# Patient Record
Sex: FEMALE | Race: WHITE | NOT HISPANIC OR LATINO | Employment: OTHER | URBAN - METROPOLITAN AREA
[De-identification: names, ages, dates, MRNs, and addresses within clinical notes are randomized per-mention and may not be internally consistent; named-entity substitution may affect disease eponyms.]

---

## 2017-05-28 DIAGNOSIS — I65.23 OCCLUSION AND STENOSIS OF BILATERAL CAROTID ARTERIES: ICD-10-CM

## 2017-06-01 ENCOUNTER — HOSPITAL ENCOUNTER (OUTPATIENT)
Dept: NON INVASIVE DIAGNOSTICS | Facility: CLINIC | Age: 82
Discharge: HOME | End: 2017-06-01
Payer: MEDICARE

## 2017-06-01 DIAGNOSIS — I65.23 OCCLUSION AND STENOSIS OF BILATERAL CAROTID ARTERIES: ICD-10-CM

## 2017-06-01 PROCEDURE — 93880 EXTRACRANIAL BILAT STUDY: CPT

## 2018-04-24 ENCOUNTER — TRANSCRIBE ORDERS (OUTPATIENT)
Dept: ADMINISTRATIVE | Facility: HOSPITAL | Age: 83
End: 2018-04-24

## 2018-04-24 DIAGNOSIS — I65.23 BILATERAL CAROTID ARTERY STENOSIS: Primary | ICD-10-CM

## 2018-06-07 ENCOUNTER — HOSPITAL ENCOUNTER (OUTPATIENT)
Dept: NON INVASIVE DIAGNOSTICS | Facility: CLINIC | Age: 83
Discharge: HOME | End: 2018-06-07
Payer: MEDICARE

## 2018-06-07 DIAGNOSIS — I65.23 BILATERAL CAROTID ARTERY STENOSIS: ICD-10-CM

## 2018-06-07 PROCEDURE — 93880 EXTRACRANIAL BILAT STUDY: CPT

## 2018-06-07 PROCEDURE — 93880 EXTRACRANIAL BILAT STUDY: CPT | Performed by: SURGERY

## 2019-06-11 ENCOUNTER — TELEPHONE (OUTPATIENT)
Dept: ADMINISTRATIVE | Facility: HOSPITAL | Age: 84
End: 2019-06-11

## 2019-06-11 DIAGNOSIS — I65.23 CAROTID STENOSIS, BILATERAL: Primary | ICD-10-CM

## 2019-07-01 ENCOUNTER — HOSPITAL ENCOUNTER (OUTPATIENT)
Dept: NON INVASIVE DIAGNOSTICS | Facility: CLINIC | Age: 84
Discharge: HOME | End: 2019-07-01
Payer: MEDICARE

## 2019-07-01 DIAGNOSIS — I65.23 CAROTID STENOSIS, BILATERAL: ICD-10-CM

## 2019-07-01 PROCEDURE — 93880 EXTRACRANIAL BILAT STUDY: CPT

## 2019-07-02 PROCEDURE — 93880 EXTRACRANIAL BILAT STUDY: CPT | Performed by: SURGERY

## 2019-07-11 NOTE — PROGRESS NOTES
Assessment/Plan:    81 y/o F with HTN, HLD, CAD hx MI, and carotid stenosis, seen for evaluation of carotid stenosis  Asymptomatic bilateral carotid artery stenosis  -CV duplex results- Bilateral ICA less than 50% stenosis  -Asymptomatic  -Continue aspirin and atorvastatin  -BP within normal limits  Continue current medication regimen  -Will continue routine surveillance monitoring with CV duplex [id number]    Hypertension  -BP within normal limits at today's visit  -Continue current medication regimen  -Management per PCP    Hyperlipidemia  -Continue atorvastatin       Diagnoses and all orders for this visit:    Asymptomatic bilateral carotid artery stenosis  -     Cancel: VAS carotid complete study; Future  -     VAS carotid complete study; Future    Mixed hyperlipidemia    Essential hypertension          Subjective:      Patient ID: Chris Parkinson is a 80 y o  female  Patient is new to our practice, referred by Dr Melodie Mena  Seen to establish routine vascular care  Patient has known bilateral carotid stenosis  She had CV duplex done 7/1/19  She denies any TIA/CVA symptoms; denies amaurosis fugax, lateralizing weakness or numbness, slurred speech, facial droop, or visual disturbances  She denies any family history of stroke  She reports that she has been on aspirin and cholesterol medication since a heart attack over 30 years ago  She denies any claudication symptoms  Offers no other complaints  States she walks often and rides her bike with no difficulty  She is a never smoker  Pt is new to our practice and was referred by Dr Rosemarie Grande MD to review the results of her CV on 7/1/19  Pt denies any CVA/TIA symptoms  Pt is currently taking ASA and Lipitor        The following portions of the patient's history were reviewed and updated as appropriate: allergies, current medications, past family history, past medical history, past social history, past surgical history and problem list     Review of Systems   Constitutional: Negative  HENT: Positive for ear discharge and ear pain  Eyes: Negative  Respiratory: Negative  Cardiovascular: Negative  Gastrointestinal: Negative  Endocrine: Negative  Genitourinary: Negative  Musculoskeletal: Negative  Skin: Negative  Allergic/Immunologic: Negative  Neurological: Negative  Hematological: Negative  Psychiatric/Behavioral: Negative  Objective:     Physical Exam   Constitutional: She is oriented to person, place, and time  She appears well-nourished  No distress  HENT:   Head: Normocephalic and atraumatic  Eyes: No scleral icterus  Neck: Neck supple  No JVD present  No carotid bruits   Cardiovascular: Normal rate and regular rhythm  Pulses:       Radial pulses are 2+ on the right side, and 2+ on the left side  Pulmonary/Chest: Effort normal and breath sounds normal    Abdominal: Soft  Bowel sounds are normal  She exhibits no distension and no mass  There is no tenderness  Musculoskeletal: She exhibits no edema  Neurological: She is alert and oriented to person, place, and time  Skin: Skin is warm and dry  Psychiatric: She has a normal mood and affect  I have reviewed and made appropriate changes to the review of systems input by the medical assistant      Vitals:    07/15/19 1340 07/15/19 1343   BP: 118/72 122/76   BP Location: Left arm Right arm   Patient Position: Sitting Sitting   Cuff Size: Adult Adult   Pulse: 84    Resp: 16    Temp: 98 2 °F (36 8 °C)    TempSrc: Tympanic    Weight: 56 7 kg (125 lb)    Height: 5' (1 524 m)        Patient Active Problem List   Diagnosis    Hyperlipidemia    Hypertension    Asymptomatic bilateral carotid artery stenosis       Past Surgical History:   Procedure Laterality Date    ADENOIDECTOMY      APPENDECTOMY      STAPEDECTOMY      TONSILLECTOMY         Family History   Problem Relation Age of Onset    No Known Problems Mother Social History     Socioeconomic History    Marital status: /Civil Union     Spouse name: Not on file    Number of children: Not on file    Years of education: Not on file    Highest education level: Not on file   Occupational History    Not on file   Social Needs    Financial resource strain: Not on file    Food insecurity:     Worry: Not on file     Inability: Not on file    Transportation needs:     Medical: Not on file     Non-medical: Not on file   Tobacco Use    Smoking status: Never Smoker    Smokeless tobacco: Never Used   Substance and Sexual Activity    Alcohol use:  Yes    Drug use: Never    Sexual activity: Not on file   Lifestyle    Physical activity:     Days per week: Not on file     Minutes per session: Not on file    Stress: Not on file   Relationships    Social connections:     Talks on phone: Not on file     Gets together: Not on file     Attends Baptism service: Not on file     Active member of club or organization: Not on file     Attends meetings of clubs or organizations: Not on file     Relationship status: Not on file    Intimate partner violence:     Fear of current or ex partner: Not on file     Emotionally abused: Not on file     Physically abused: Not on file     Forced sexual activity: Not on file   Other Topics Concern    Not on file   Social History Narrative    Not on file       No Known Allergies      Current Outpatient Medications:     ALPRAZolam (XANAX) 0 25 mg tablet, Take 0 25 mg by mouth daily as needed, Disp: , Rfl: 5    amLODIPine (NORVASC) 5 mg tablet, Take by mouth, Disp: , Rfl:     aspirin 81 MG tablet, Take by mouth, Disp: , Rfl:     atorvastatin (LIPITOR) 40 mg tablet, , Disp: , Rfl:     FLUoxetine (PROzac) 10 mg capsule, , Disp: , Rfl:     fluticasone (FLONASE) 50 mcg/act nasal spray, , Disp: , Rfl:     ibuprofen (MOTRIN) 800 mg tablet, , Disp: , Rfl:     levothyroxine 25 mcg tablet, , Disp: , Rfl:     lisinopril (ZESTRIL) 20 mg tablet, , Disp: , Rfl:     nadolol (CORGARD) 40 mg tablet, , Disp: , Rfl:     erythromycin (ILOTYCIN) ophthalmic ointment, , Disp: , Rfl:

## 2019-07-15 ENCOUNTER — OFFICE VISIT (OUTPATIENT)
Dept: VASCULAR SURGERY | Facility: CLINIC | Age: 84
End: 2019-07-15
Payer: MEDICARE

## 2019-07-15 VITALS
TEMPERATURE: 98.2 F | SYSTOLIC BLOOD PRESSURE: 122 MMHG | BODY MASS INDEX: 24.54 KG/M2 | HEIGHT: 60 IN | WEIGHT: 125 LBS | HEART RATE: 84 BPM | RESPIRATION RATE: 16 BRPM | DIASTOLIC BLOOD PRESSURE: 76 MMHG

## 2019-07-15 DIAGNOSIS — E78.2 MIXED HYPERLIPIDEMIA: ICD-10-CM

## 2019-07-15 DIAGNOSIS — I10 ESSENTIAL HYPERTENSION: ICD-10-CM

## 2019-07-15 DIAGNOSIS — I65.23 ASYMPTOMATIC BILATERAL CAROTID ARTERY STENOSIS: Primary | ICD-10-CM

## 2019-07-15 PROCEDURE — 99204 OFFICE O/P NEW MOD 45 MIN: CPT | Performed by: NURSE PRACTITIONER

## 2019-07-15 NOTE — PATIENT INSTRUCTIONS
Bilateral asymptomatic carotid stenosis  -we reviewed the results of your recent carotid artery duplex  You have mild less than 50% carotid stenosis to both carotid arteries, this is unchanged for multiple years  -continue all your current medications including aspirin and Lipitor/atorvastatin  -we will continue to monitor for any disease progression with a repeat carotid duplex in 24 months/2 years  -follow-up after repeat imaging    Notify the office prior with any questions/concerns or change in symptoms

## 2019-07-15 NOTE — ASSESSMENT & PLAN NOTE
-BP within normal limits at today's visit  -Continue current medication regimen  -Management per PCP

## 2019-07-15 NOTE — LETTER
July 15, 2019     Brian Freeman MD  [facility] [address]    Patient: Lovetta Angelucci   YOB: 1929   Date of Visit: 7/15/2019       Dear Dr Inocencia Chen: Thank you for referring Tram Maria to me for evaluation  Below are my notes for this consultation  If you have questions, please do not hesitate to call me  I look forward to following your patient along with you           Sincerely,        BLANCA Vail        CC: No Recipients

## 2020-01-31 ENCOUNTER — LAB REQUISITION (OUTPATIENT)
Dept: LAB | Facility: HOSPITAL | Age: 85
End: 2020-01-31
Payer: MEDICARE

## 2020-01-31 DIAGNOSIS — L03.032 CELLULITIS OF LEFT TOE: ICD-10-CM

## 2020-01-31 PROCEDURE — 87186 SC STD MICRODIL/AGAR DIL: CPT | Performed by: PODIATRIST

## 2020-01-31 PROCEDURE — 87147 CULTURE TYPE IMMUNOLOGIC: CPT | Performed by: PODIATRIST

## 2020-01-31 PROCEDURE — 87070 CULTURE OTHR SPECIMN AEROBIC: CPT | Performed by: PODIATRIST

## 2020-01-31 PROCEDURE — 87205 SMEAR GRAM STAIN: CPT | Performed by: PODIATRIST

## 2020-02-03 LAB
BACTERIA WND AEROBE CULT: ABNORMAL
GRAM STN SPEC: ABNORMAL
GRAM STN SPEC: ABNORMAL

## 2020-04-21 DIAGNOSIS — I10 ESSENTIAL HYPERTENSION: Primary | ICD-10-CM

## 2020-04-21 RX ORDER — LISINOPRIL 20 MG/1
TABLET ORAL
Qty: 90 TABLET | Refills: 1 | Status: SHIPPED | OUTPATIENT
Start: 2020-04-21 | End: 2020-08-31

## 2020-04-24 ENCOUNTER — TELEMEDICINE (OUTPATIENT)
Dept: FAMILY MEDICINE CLINIC | Facility: CLINIC | Age: 85
End: 2020-04-24
Payer: MEDICARE

## 2020-04-24 VITALS — HEIGHT: 60 IN | BODY MASS INDEX: 24.54 KG/M2 | WEIGHT: 125 LBS

## 2020-04-24 DIAGNOSIS — I10 ESSENTIAL HYPERTENSION: ICD-10-CM

## 2020-04-24 DIAGNOSIS — E78.2 MIXED HYPERLIPIDEMIA: ICD-10-CM

## 2020-04-24 DIAGNOSIS — E11.9 TYPE 2 DIABETES MELLITUS WITHOUT COMPLICATION, WITHOUT LONG-TERM CURRENT USE OF INSULIN (HCC): Primary | ICD-10-CM

## 2020-04-24 DIAGNOSIS — I65.23 ASYMPTOMATIC BILATERAL CAROTID ARTERY STENOSIS: ICD-10-CM

## 2020-04-24 DIAGNOSIS — E55.9 VITAMIN D DEFICIENCY: ICD-10-CM

## 2020-04-24 DIAGNOSIS — F41.9 ANXIETY: ICD-10-CM

## 2020-04-24 DIAGNOSIS — E53.8 B12 DEFICIENCY: ICD-10-CM

## 2020-04-24 DIAGNOSIS — E03.8 OTHER SPECIFIED HYPOTHYROIDISM: ICD-10-CM

## 2020-04-24 DIAGNOSIS — H91.90 HEARING LOSS, UNSPECIFIED HEARING LOSS TYPE, UNSPECIFIED LATERALITY: ICD-10-CM

## 2020-04-24 DIAGNOSIS — I25.10 CORONARY ARTERY DISEASE INVOLVING NATIVE HEART WITHOUT ANGINA PECTORIS, UNSPECIFIED VESSEL OR LESION TYPE: ICD-10-CM

## 2020-04-24 PROBLEM — M81.0 AGE-RELATED OSTEOPOROSIS WITHOUT CURRENT PATHOLOGICAL FRACTURE: Status: ACTIVE | Noted: 2020-04-24

## 2020-04-24 PROBLEM — K59.01 SLOW TRANSIT CONSTIPATION: Status: ACTIVE | Noted: 2020-04-24

## 2020-04-24 PROBLEM — E07.9 DISEASE OF THYROID GLAND: Status: RESOLVED | Noted: 2020-04-24 | Resolved: 2020-04-24

## 2020-04-24 PROBLEM — I35.0 SEVERE AORTIC STENOSIS: Status: ACTIVE | Noted: 2020-04-24

## 2020-04-24 PROCEDURE — 99215 OFFICE O/P EST HI 40 MIN: CPT | Performed by: FAMILY MEDICINE

## 2020-04-24 RX ORDER — AMLODIPINE BESYLATE 2.5 MG/1
TABLET ORAL
Qty: 270 TABLET | Refills: 1 | Status: SHIPPED | OUTPATIENT
Start: 2020-04-24 | End: 2020-12-04 | Stop reason: SDUPTHER

## 2020-04-24 RX ORDER — BLOOD SUGAR DIAGNOSTIC
STRIP MISCELLANEOUS
COMMUNITY
Start: 2020-02-17 | End: 2020-05-26

## 2020-04-24 RX ORDER — LEVOTHYROXINE SODIUM 0.03 MG/1
25 TABLET ORAL DAILY
Qty: 90 TABLET | Refills: 1 | Status: SHIPPED | OUTPATIENT
Start: 2020-04-24 | End: 2020-12-19 | Stop reason: SDUPTHER

## 2020-04-24 RX ORDER — METFORMIN HYDROCHLORIDE 500 MG/1
500 TABLET, EXTENDED RELEASE ORAL DAILY
Qty: 90 TABLET | Refills: 1 | Status: SHIPPED | OUTPATIENT
Start: 2020-04-24 | End: 2021-01-18

## 2020-04-24 RX ORDER — CEFADROXIL 500 MG/1
500 CAPSULE ORAL 2 TIMES DAILY
COMMUNITY
Start: 2020-01-31 | End: 2020-12-04 | Stop reason: ALTCHOICE

## 2020-05-08 ENCOUNTER — APPOINTMENT (OUTPATIENT)
Dept: LAB | Facility: CLINIC | Age: 85
End: 2020-05-08
Payer: MEDICARE

## 2020-05-08 DIAGNOSIS — I10 ESSENTIAL HYPERTENSION: ICD-10-CM

## 2020-05-08 DIAGNOSIS — E53.8 B12 DEFICIENCY: ICD-10-CM

## 2020-05-08 DIAGNOSIS — E11.9 TYPE 2 DIABETES MELLITUS WITHOUT COMPLICATION, WITHOUT LONG-TERM CURRENT USE OF INSULIN (HCC): ICD-10-CM

## 2020-05-08 DIAGNOSIS — I25.10 CORONARY ARTERY DISEASE INVOLVING NATIVE HEART WITHOUT ANGINA PECTORIS, UNSPECIFIED VESSEL OR LESION TYPE: ICD-10-CM

## 2020-05-08 DIAGNOSIS — E78.2 MIXED HYPERLIPIDEMIA: ICD-10-CM

## 2020-05-08 DIAGNOSIS — E03.8 OTHER SPECIFIED HYPOTHYROIDISM: ICD-10-CM

## 2020-05-08 DIAGNOSIS — E55.9 VITAMIN D DEFICIENCY: ICD-10-CM

## 2020-05-08 LAB
25(OH)D3 SERPL-MCNC: 24.1 NG/ML (ref 30–100)
ALBUMIN SERPL BCP-MCNC: 3.7 G/DL (ref 3.5–5)
ALP SERPL-CCNC: 148 U/L (ref 46–116)
ALT SERPL W P-5'-P-CCNC: 20 U/L (ref 12–78)
ANION GAP SERPL CALCULATED.3IONS-SCNC: 0 MMOL/L (ref 4–13)
AST SERPL W P-5'-P-CCNC: 17 U/L (ref 5–45)
BILIRUB SERPL-MCNC: 0.65 MG/DL (ref 0.2–1)
BILIRUB UR QL STRIP: NEGATIVE
BUN SERPL-MCNC: 11 MG/DL (ref 5–25)
CALCIUM SERPL-MCNC: 9.5 MG/DL (ref 8.3–10.1)
CHLORIDE SERPL-SCNC: 103 MMOL/L (ref 100–108)
CHOLEST SERPL-MCNC: 151 MG/DL (ref 50–200)
CLARITY UR: CLEAR
CO2 SERPL-SCNC: 31 MMOL/L (ref 21–32)
COLOR UR: YELLOW
CREAT SERPL-MCNC: 0.86 MG/DL (ref 0.6–1.3)
CREAT UR-MCNC: 24.6 MG/DL
ERYTHROCYTE [DISTWIDTH] IN BLOOD BY AUTOMATED COUNT: 13 % (ref 11.6–15.1)
EST. AVERAGE GLUCOSE BLD GHB EST-MCNC: 148 MG/DL
GFR SERPL CREATININE-BSD FRML MDRD: 60 ML/MIN/1.73SQ M
GLUCOSE P FAST SERPL-MCNC: 146 MG/DL (ref 65–99)
GLUCOSE UR STRIP-MCNC: NEGATIVE MG/DL
HBA1C MFR BLD: 6.8 %
HCT VFR BLD AUTO: 43.6 % (ref 34.8–46.1)
HDLC SERPL-MCNC: 59 MG/DL
HGB BLD-MCNC: 14 G/DL (ref 11.5–15.4)
HGB UR QL STRIP.AUTO: NEGATIVE
KETONES UR STRIP-MCNC: NEGATIVE MG/DL
LDLC SERPL CALC-MCNC: 73 MG/DL (ref 0–100)
LEUKOCYTE ESTERASE UR QL STRIP: NEGATIVE
MAGNESIUM SERPL-MCNC: 2.3 MG/DL (ref 1.6–2.6)
MCH RBC QN AUTO: 30.6 PG (ref 26.8–34.3)
MCHC RBC AUTO-ENTMCNC: 32.1 G/DL (ref 31.4–37.4)
MCV RBC AUTO: 95 FL (ref 82–98)
MICROALBUMIN UR-MCNC: 41.5 MG/L (ref 0–20)
MICROALBUMIN/CREAT 24H UR: 169 MG/G CREATININE (ref 0–30)
NITRITE UR QL STRIP: NEGATIVE
NONHDLC SERPL-MCNC: 92 MG/DL
NT-PROBNP SERPL-MCNC: 888 PG/ML
PH UR STRIP.AUTO: 7.5 [PH]
PLATELET # BLD AUTO: 282 THOUSANDS/UL (ref 149–390)
PMV BLD AUTO: 11.6 FL (ref 8.9–12.7)
POTASSIUM SERPL-SCNC: 4.1 MMOL/L (ref 3.5–5.3)
PROT SERPL-MCNC: 8.2 G/DL (ref 6.4–8.2)
PROT UR STRIP-MCNC: NEGATIVE MG/DL
RBC # BLD AUTO: 4.58 MILLION/UL (ref 3.81–5.12)
SODIUM SERPL-SCNC: 134 MMOL/L (ref 136–145)
SP GR UR STRIP.AUTO: 1.01 (ref 1–1.03)
TRIGL SERPL-MCNC: 95 MG/DL
TSH SERPL DL<=0.05 MIU/L-ACNC: 3.49 UIU/ML (ref 0.36–3.74)
UROBILINOGEN UR QL STRIP.AUTO: 0.2 E.U./DL
VIT B12 SERPL-MCNC: 242 PG/ML (ref 100–900)
WBC # BLD AUTO: 7.56 THOUSAND/UL (ref 4.31–10.16)

## 2020-05-08 PROCEDURE — 80053 COMPREHEN METABOLIC PANEL: CPT

## 2020-05-08 PROCEDURE — 85027 COMPLETE CBC AUTOMATED: CPT

## 2020-05-08 PROCEDURE — 82607 VITAMIN B-12: CPT

## 2020-05-08 PROCEDURE — 83036 HEMOGLOBIN GLYCOSYLATED A1C: CPT

## 2020-05-08 PROCEDURE — 83735 ASSAY OF MAGNESIUM: CPT

## 2020-05-08 PROCEDURE — 82043 UR ALBUMIN QUANTITATIVE: CPT

## 2020-05-08 PROCEDURE — 82570 ASSAY OF URINE CREATININE: CPT

## 2020-05-08 PROCEDURE — 82306 VITAMIN D 25 HYDROXY: CPT

## 2020-05-08 PROCEDURE — 81003 URINALYSIS AUTO W/O SCOPE: CPT

## 2020-05-08 PROCEDURE — 83880 ASSAY OF NATRIURETIC PEPTIDE: CPT

## 2020-05-08 PROCEDURE — 36415 COLL VENOUS BLD VENIPUNCTURE: CPT

## 2020-05-08 PROCEDURE — 80061 LIPID PANEL: CPT

## 2020-05-08 PROCEDURE — 84443 ASSAY THYROID STIM HORMONE: CPT

## 2020-05-11 ENCOUNTER — TELEPHONE (OUTPATIENT)
Dept: FAMILY MEDICINE CLINIC | Facility: CLINIC | Age: 85
End: 2020-05-11

## 2020-05-11 DIAGNOSIS — R60.9 EDEMA, UNSPECIFIED TYPE: Primary | ICD-10-CM

## 2020-05-11 RX ORDER — FUROSEMIDE 20 MG/1
20 TABLET ORAL DAILY
Qty: 30 TABLET | Refills: 5 | Status: SHIPPED | OUTPATIENT
Start: 2020-05-11 | End: 2020-12-04 | Stop reason: ALTCHOICE

## 2020-05-25 DIAGNOSIS — E11.9 TYPE 2 DIABETES MELLITUS WITHOUT COMPLICATION, WITHOUT LONG-TERM CURRENT USE OF INSULIN (HCC): Primary | ICD-10-CM

## 2020-05-26 RX ORDER — LANCETS 28 GAUGE
EACH MISCELLANEOUS
Qty: 100 EACH | Refills: 11 | Status: SHIPPED | OUTPATIENT
Start: 2020-05-26 | End: 2020-07-22 | Stop reason: SDUPTHER

## 2020-05-26 RX ORDER — BLOOD SUGAR DIAGNOSTIC
STRIP MISCELLANEOUS
Qty: 100 EACH | Refills: 4 | Status: SHIPPED | OUTPATIENT
Start: 2020-05-26 | End: 2020-07-22 | Stop reason: SDUPTHER

## 2020-07-22 DIAGNOSIS — E11.9 TYPE 2 DIABETES MELLITUS WITHOUT COMPLICATION, WITHOUT LONG-TERM CURRENT USE OF INSULIN (HCC): ICD-10-CM

## 2020-07-22 RX ORDER — LANCETS 28 GAUGE
EACH MISCELLANEOUS
Qty: 100 EACH | Refills: 11 | Status: SHIPPED | OUTPATIENT
Start: 2020-07-22 | End: 2021-10-04

## 2020-08-31 DIAGNOSIS — E11.9 TYPE 2 DIABETES MELLITUS WITHOUT COMPLICATION, WITHOUT LONG-TERM CURRENT USE OF INSULIN (HCC): ICD-10-CM

## 2020-08-31 DIAGNOSIS — I10 ESSENTIAL HYPERTENSION: ICD-10-CM

## 2020-08-31 RX ORDER — BLOOD SUGAR DIAGNOSTIC
STRIP MISCELLANEOUS
Qty: 100 EACH | Refills: 4 | Status: SHIPPED | OUTPATIENT
Start: 2020-08-31 | End: 2021-09-07

## 2020-08-31 RX ORDER — LISINOPRIL 20 MG/1
TABLET ORAL
Qty: 90 TABLET | Refills: 1 | Status: SHIPPED | OUTPATIENT
Start: 2020-08-31 | End: 2020-12-04 | Stop reason: SDUPTHER

## 2020-09-03 DIAGNOSIS — I10 ESSENTIAL HYPERTENSION: Primary | ICD-10-CM

## 2020-09-04 RX ORDER — NADOLOL 40 MG/1
TABLET ORAL
Qty: 90 TABLET | Refills: 1 | Status: SHIPPED | OUTPATIENT
Start: 2020-09-04 | End: 2020-12-04 | Stop reason: SDUPTHER

## 2020-09-22 DIAGNOSIS — F41.9 ANXIETY: Primary | ICD-10-CM

## 2020-09-22 RX ORDER — FLUOXETINE 10 MG/1
10 TABLET, FILM COATED ORAL DAILY
Qty: 30 TABLET | Refills: 5 | Status: SHIPPED | OUTPATIENT
Start: 2020-09-22 | End: 2020-12-04

## 2020-10-05 DIAGNOSIS — E11.9 TYPE 2 DIABETES MELLITUS WITHOUT COMPLICATION, WITHOUT LONG-TERM CURRENT USE OF INSULIN (HCC): Primary | ICD-10-CM

## 2020-10-06 RX ORDER — ALPRAZOLAM 0.25 MG/1
TABLET ORAL
Qty: 30 TABLET | Refills: 5 | Status: SHIPPED | OUTPATIENT
Start: 2020-10-06 | End: 2020-12-04 | Stop reason: ALTCHOICE

## 2020-10-21 ENCOUNTER — TELEPHONE (OUTPATIENT)
Dept: FAMILY MEDICINE CLINIC | Facility: CLINIC | Age: 85
End: 2020-10-21

## 2020-12-04 ENCOUNTER — OFFICE VISIT (OUTPATIENT)
Dept: FAMILY MEDICINE CLINIC | Facility: CLINIC | Age: 85
End: 2020-12-04
Payer: MEDICARE

## 2020-12-04 VITALS
WEIGHT: 134 LBS | SYSTOLIC BLOOD PRESSURE: 126 MMHG | HEART RATE: 73 BPM | OXYGEN SATURATION: 97 % | HEIGHT: 60 IN | RESPIRATION RATE: 16 BRPM | DIASTOLIC BLOOD PRESSURE: 82 MMHG | BODY MASS INDEX: 26.31 KG/M2

## 2020-12-04 DIAGNOSIS — Z00.00 WELL ADULT EXAM: ICD-10-CM

## 2020-12-04 DIAGNOSIS — E03.9 HYPOTHYROIDISM, UNSPECIFIED TYPE: ICD-10-CM

## 2020-12-04 DIAGNOSIS — F41.9 ANXIETY: Primary | ICD-10-CM

## 2020-12-04 DIAGNOSIS — E53.8 B12 DEFICIENCY: ICD-10-CM

## 2020-12-04 DIAGNOSIS — E11.9 TYPE 2 DIABETES MELLITUS WITHOUT COMPLICATION, WITHOUT LONG-TERM CURRENT USE OF INSULIN (HCC): ICD-10-CM

## 2020-12-04 DIAGNOSIS — I10 ESSENTIAL HYPERTENSION: ICD-10-CM

## 2020-12-04 DIAGNOSIS — E55.9 VITAMIN D DEFICIENCY: ICD-10-CM

## 2020-12-04 PROCEDURE — 99214 OFFICE O/P EST MOD 30 MIN: CPT | Performed by: FAMILY MEDICINE

## 2020-12-04 PROCEDURE — G0439 PPPS, SUBSEQ VISIT: HCPCS | Performed by: FAMILY MEDICINE

## 2020-12-04 RX ORDER — LISINOPRIL 20 MG/1
20 TABLET ORAL DAILY
Qty: 90 TABLET | Refills: 1 | Status: SHIPPED | OUTPATIENT
Start: 2020-12-04 | End: 2021-06-29

## 2020-12-04 RX ORDER — ESCITALOPRAM OXALATE 10 MG/1
10 TABLET ORAL DAILY
Qty: 30 TABLET | Refills: 1 | Status: SHIPPED | OUTPATIENT
Start: 2020-12-04 | End: 2021-04-09 | Stop reason: ALTCHOICE

## 2020-12-04 RX ORDER — NADOLOL 40 MG/1
40 TABLET ORAL DAILY
Qty: 90 TABLET | Refills: 1 | Status: SHIPPED | OUTPATIENT
Start: 2020-12-04 | End: 2021-06-21

## 2020-12-04 RX ORDER — FLUOXETINE 10 MG/1
10 CAPSULE ORAL DAILY
COMMUNITY
Start: 2020-11-19 | End: 2020-12-04

## 2020-12-04 RX ORDER — A/SINGAPORE/GP1908/2015 IVR-180 (AN A/MICHIGAN/45/2015 (H1N1)PDM09-LIKE VIRUS, A/HONG KONG/4801/2014, NYMC X-263B (H3N2) (AN A/HONG KONG/4801/2014-LIKE VIRUS), AND B/BRISBANE/60/2008, WILD TYPE (A B/BRISBANE/60/2008-LIKE VIRUS) 15; 15; 15 UG/.5ML; UG/.5ML; UG/.5ML
INJECTION, SUSPENSION INTRAMUSCULAR
COMMUNITY
Start: 2020-11-02 | End: 2021-04-09 | Stop reason: ALTCHOICE

## 2020-12-04 RX ORDER — ATORVASTATIN CALCIUM 40 MG/1
40 TABLET, FILM COATED ORAL DAILY
Qty: 90 TABLET | Refills: 1 | Status: SHIPPED | OUTPATIENT
Start: 2020-12-04 | End: 2021-06-21

## 2020-12-04 RX ORDER — DIAZEPAM 5 MG/1
5 TABLET ORAL EVERY 6 HOURS PRN
COMMUNITY
End: 2020-12-07 | Stop reason: SDUPTHER

## 2020-12-04 RX ORDER — AMLODIPINE BESYLATE 2.5 MG/1
2.5 TABLET ORAL 2 TIMES DAILY
Qty: 180 TABLET | Refills: 1 | Status: SHIPPED | OUTPATIENT
Start: 2020-12-04 | End: 2021-04-09 | Stop reason: SDUPTHER

## 2020-12-07 DIAGNOSIS — E11.9 TYPE 2 DIABETES MELLITUS WITHOUT COMPLICATION, WITHOUT LONG-TERM CURRENT USE OF INSULIN (HCC): Primary | ICD-10-CM

## 2020-12-08 RX ORDER — DIAZEPAM 5 MG/1
5 TABLET ORAL EVERY 6 HOURS PRN
Qty: 30 TABLET | Refills: 5 | Status: SHIPPED | OUTPATIENT
Start: 2020-12-08 | End: 2021-04-09 | Stop reason: ALTCHOICE

## 2020-12-19 DIAGNOSIS — E03.8 OTHER SPECIFIED HYPOTHYROIDISM: ICD-10-CM

## 2020-12-19 RX ORDER — LEVOTHYROXINE SODIUM 0.03 MG/1
25 TABLET ORAL DAILY
Qty: 90 TABLET | Refills: 1 | Status: SHIPPED | OUTPATIENT
Start: 2020-12-19 | End: 2020-12-21

## 2020-12-21 DIAGNOSIS — E03.8 OTHER SPECIFIED HYPOTHYROIDISM: ICD-10-CM

## 2020-12-21 RX ORDER — LEVOTHYROXINE SODIUM 0.03 MG/1
TABLET ORAL
Qty: 90 TABLET | Refills: 1 | Status: SHIPPED | OUTPATIENT
Start: 2020-12-21 | End: 2021-01-23 | Stop reason: SDUPTHER

## 2021-01-05 LAB
LEFT EYE DIABETIC RETINOPATHY: NORMAL
RIGHT EYE DIABETIC RETINOPATHY: NORMAL

## 2021-01-16 DIAGNOSIS — E11.9 TYPE 2 DIABETES MELLITUS WITHOUT COMPLICATION, WITHOUT LONG-TERM CURRENT USE OF INSULIN (HCC): ICD-10-CM

## 2021-01-18 RX ORDER — METFORMIN HYDROCHLORIDE 500 MG/1
TABLET, EXTENDED RELEASE ORAL
Qty: 90 TABLET | Refills: 1 | Status: SHIPPED | OUTPATIENT
Start: 2021-01-18 | End: 2021-04-09

## 2021-01-20 DIAGNOSIS — Z23 ENCOUNTER FOR IMMUNIZATION: ICD-10-CM

## 2021-01-21 ENCOUNTER — LAB (OUTPATIENT)
Dept: LAB | Facility: CLINIC | Age: 86
End: 2021-01-21
Payer: MEDICARE

## 2021-01-21 ENCOUNTER — TRANSCRIBE ORDERS (OUTPATIENT)
Dept: LAB | Facility: CLINIC | Age: 86
End: 2021-01-21

## 2021-01-21 DIAGNOSIS — E03.9 HYPOTHYROIDISM, UNSPECIFIED TYPE: ICD-10-CM

## 2021-01-21 DIAGNOSIS — E11.9 TYPE 2 DIABETES MELLITUS WITHOUT COMPLICATION, WITHOUT LONG-TERM CURRENT USE OF INSULIN (HCC): ICD-10-CM

## 2021-01-21 DIAGNOSIS — E55.9 VITAMIN D DEFICIENCY: ICD-10-CM

## 2021-01-21 DIAGNOSIS — I10 ESSENTIAL HYPERTENSION: ICD-10-CM

## 2021-01-21 DIAGNOSIS — F41.9 ANXIETY: ICD-10-CM

## 2021-01-21 DIAGNOSIS — E53.8 B12 DEFICIENCY: ICD-10-CM

## 2021-01-21 LAB
25(OH)D3 SERPL-MCNC: 22.7 NG/ML (ref 30–100)
CHOLEST SERPL-MCNC: 165 MG/DL (ref 50–200)
EST. AVERAGE GLUCOSE BLD GHB EST-MCNC: 186 MG/DL
HBA1C MFR BLD: 8.1 %
HDLC SERPL-MCNC: 56 MG/DL
LDLC SERPL CALC-MCNC: 88 MG/DL (ref 0–100)
NT-PROBNP SERPL-MCNC: 2381 PG/ML
T4 FREE SERPL-MCNC: 0.9 NG/DL (ref 0.76–1.46)
TRIGL SERPL-MCNC: 106 MG/DL
TSH SERPL DL<=0.05 MIU/L-ACNC: 3.83 UIU/ML (ref 0.36–3.74)
VIT B12 SERPL-MCNC: 247 PG/ML (ref 100–900)

## 2021-01-21 PROCEDURE — 83880 ASSAY OF NATRIURETIC PEPTIDE: CPT

## 2021-01-21 PROCEDURE — 80061 LIPID PANEL: CPT

## 2021-01-21 PROCEDURE — 84439 ASSAY OF FREE THYROXINE: CPT

## 2021-01-21 PROCEDURE — 82306 VITAMIN D 25 HYDROXY: CPT

## 2021-01-21 PROCEDURE — 82607 VITAMIN B-12: CPT

## 2021-01-21 PROCEDURE — 83036 HEMOGLOBIN GLYCOSYLATED A1C: CPT

## 2021-01-21 PROCEDURE — 84443 ASSAY THYROID STIM HORMONE: CPT

## 2021-01-21 PROCEDURE — 36415 COLL VENOUS BLD VENIPUNCTURE: CPT

## 2021-01-22 ENCOUNTER — IMMUNIZATIONS (OUTPATIENT)
Dept: FAMILY MEDICINE CLINIC | Facility: HOSPITAL | Age: 86
End: 2021-01-22

## 2021-01-22 DIAGNOSIS — Z23 ENCOUNTER FOR IMMUNIZATION: Primary | ICD-10-CM

## 2021-01-22 PROCEDURE — 91301 SARS-COV-2 / COVID-19 MRNA VACCINE (MODERNA) 100 MCG: CPT

## 2021-01-22 PROCEDURE — 0011A SARS-COV-2 / COVID-19 MRNA VACCINE (MODERNA) 100 MCG: CPT

## 2021-01-23 DIAGNOSIS — E03.8 OTHER SPECIFIED HYPOTHYROIDISM: ICD-10-CM

## 2021-01-23 DIAGNOSIS — R79.89 ELEVATED BRAIN NATRIURETIC PEPTIDE (BNP) LEVEL: Primary | ICD-10-CM

## 2021-01-23 RX ORDER — LEVOTHYROXINE SODIUM 0.03 MG/1
TABLET ORAL
Qty: 90 TABLET | Refills: 1 | Status: SHIPPED | OUTPATIENT
Start: 2021-01-23 | End: 2021-06-21

## 2021-02-17 ENCOUNTER — TELEPHONE (OUTPATIENT)
Dept: VASCULAR SURGERY | Facility: CLINIC | Age: 86
End: 2021-02-17

## 2021-02-17 NOTE — TELEPHONE ENCOUNTER
Pt's daughter was notified- she said she needs to check w/ the pt to see if she wants to move up the carotid doppler and then she will call scheduling back

## 2021-02-17 NOTE — TELEPHONE ENCOUNTER
Pt w/ bilateral carotid stenosis last seen by Tina Vincent on 7/15/19, and at that time was advised to f/u in 2 yrs w/ a carotid doppler and OV, which would be due in July  Pt's daughter called to request doppler be moved up because pt has been experiencing worsening memory loss  I did advise pt's daughter that she should contact pt's PCP regarding memory loss  Pt's daughter denies pt having any vision changes, weakness or numbness  She does report that she gets "slurred speech" once in awhile  Informed her I would send this to our triage provider but she still should contact pt's PCP regarding memory loss

## 2021-02-17 NOTE — TELEPHONE ENCOUNTER
Agree, she needs to discuss this with PCP  We can move up her carotid duplex (12 mo was recommended on report) but carotid disease is unlikely to cause memory loss, further her carotids only showed mild disease so we would not expect significant change since last study

## 2021-02-19 ENCOUNTER — IMMUNIZATIONS (OUTPATIENT)
Dept: FAMILY MEDICINE CLINIC | Facility: HOSPITAL | Age: 86
End: 2021-02-19

## 2021-02-19 DIAGNOSIS — Z23 ENCOUNTER FOR IMMUNIZATION: Primary | ICD-10-CM

## 2021-02-19 PROCEDURE — 91301 SARS-COV-2 / COVID-19 MRNA VACCINE (MODERNA) 100 MCG: CPT | Performed by: EMERGENCY MEDICINE

## 2021-02-19 PROCEDURE — 0012A SARS-COV-2 / COVID-19 MRNA VACCINE (MODERNA) 100 MCG: CPT | Performed by: EMERGENCY MEDICINE

## 2021-03-08 ENCOUNTER — HOSPITAL ENCOUNTER (OUTPATIENT)
Dept: NON INVASIVE DIAGNOSTICS | Facility: HOSPITAL | Age: 86
Discharge: HOME | End: 2021-03-08
Payer: MEDICARE

## 2021-03-08 DIAGNOSIS — R79.89 ELEVATED BRAIN NATRIURETIC PEPTIDE (BNP) LEVEL: ICD-10-CM

## 2021-03-08 PROCEDURE — 93306 TTE W/DOPPLER COMPLETE: CPT

## 2021-03-08 PROCEDURE — 93306 TTE W/DOPPLER COMPLETE: CPT | Performed by: INTERNAL MEDICINE

## 2021-04-06 PROBLEM — I27.20 PULMONARY HYPERTENSION (HCC): Status: ACTIVE | Noted: 2021-04-06

## 2021-04-09 ENCOUNTER — OFFICE VISIT (OUTPATIENT)
Dept: FAMILY MEDICINE CLINIC | Facility: CLINIC | Age: 86
End: 2021-04-09
Payer: MEDICARE

## 2021-04-09 VITALS
BODY MASS INDEX: 25.52 KG/M2 | RESPIRATION RATE: 16 BRPM | HEIGHT: 60 IN | SYSTOLIC BLOOD PRESSURE: 156 MMHG | DIASTOLIC BLOOD PRESSURE: 80 MMHG | WEIGHT: 130 LBS | HEART RATE: 76 BPM | OXYGEN SATURATION: 95 %

## 2021-04-09 DIAGNOSIS — E53.8 B12 DEFICIENCY: ICD-10-CM

## 2021-04-09 DIAGNOSIS — E03.9 HYPOTHYROIDISM, UNSPECIFIED TYPE: ICD-10-CM

## 2021-04-09 DIAGNOSIS — I10 ESSENTIAL HYPERTENSION: ICD-10-CM

## 2021-04-09 DIAGNOSIS — F03.90 DEMENTIA WITHOUT BEHAVIORAL DISTURBANCE, UNSPECIFIED DEMENTIA TYPE (HCC): ICD-10-CM

## 2021-04-09 DIAGNOSIS — E11.9 TYPE 2 DIABETES MELLITUS WITHOUT COMPLICATION, WITHOUT LONG-TERM CURRENT USE OF INSULIN (HCC): ICD-10-CM

## 2021-04-09 DIAGNOSIS — I25.10 CORONARY ARTERY DISEASE INVOLVING NATIVE HEART WITHOUT ANGINA PECTORIS, UNSPECIFIED VESSEL OR LESION TYPE: ICD-10-CM

## 2021-04-09 DIAGNOSIS — I35.0 SEVERE AORTIC STENOSIS: ICD-10-CM

## 2021-04-09 DIAGNOSIS — I27.20 PULMONARY HYPERTENSION (HCC): Primary | ICD-10-CM

## 2021-04-09 PROCEDURE — 99214 OFFICE O/P EST MOD 30 MIN: CPT | Performed by: FAMILY MEDICINE

## 2021-04-09 RX ORDER — DAPAGLIFLOZIN AND METFORMIN HYDROCHLORIDE 5; 1000 MG/1; MG/1
1 TABLET, FILM COATED, EXTENDED RELEASE ORAL DAILY
Qty: 90 TABLET | Refills: 1 | Status: SHIPPED | OUTPATIENT
Start: 2021-04-09 | End: 2021-04-13

## 2021-04-09 RX ORDER — AMLODIPINE BESYLATE 2.5 MG/1
2.5 TABLET ORAL 2 TIMES DAILY
Qty: 180 TABLET | Refills: 1 | Status: SHIPPED | OUTPATIENT
Start: 2021-04-09 | End: 2022-03-14

## 2021-04-09 RX ORDER — DONEPEZIL HYDROCHLORIDE 5 MG/1
5 TABLET, FILM COATED ORAL
Qty: 90 TABLET | Refills: 1 | Status: SHIPPED | OUTPATIENT
Start: 2021-04-09 | End: 2021-09-20

## 2021-04-09 NOTE — PROGRESS NOTES
Assessment/Plan:  Both dauthers are here today to help with her mom   She has good days and bad days   Has been on ssri and benzo in the past   Pretty confused some days not recognising people   We will look for reversive causes   Like vit D b12 BG levels  And thryoid   Also we need to recheck her bnp as it was in the 2000 range she is not really following with [facility]   We will get her to a new cardio   We have had bp isues in the past and have been on norvasc 2 5 2 am 1pm and back to 1 bid - depneding on levels - we are back to 1 bid   Also lets get her to fabricio and start with donepezil 5mg for now     Vit d 5K daily  Change metformin to xigduo       1  Pulmonary hypertension ([facility] [address] )  -     Ambulatory referral to Cardiology; Future    2  Type 2 diabetes mellitus without complication, without long-term current use of insulin ([facility])  -     Dapagliflozin-metFORMIN HCl ER (Xigduo XR) 5-1000 MG TB24; Take 1 tablet by mouth daily  -     HEMOGLOBIN A1C W/ EAG ESTIMATION; Future  -     HEMOGLOBIN A1C W/ EAG ESTIMATION; Future    3  Essential hypertension  -     amLODIPine (NORVASC) 2 5 mg tablet; Take 1 tablet (2 5 mg total) by mouth 2 (two) times a day Take 1 am and 1pm  -     NT-BNP PRO; Future  -     Ambulatory referral to Cardiology; Future    4  Severe aortic stenosis  -     Ambulatory referral to Cardiology; Future    5  Coronary artery disease involving native heart without angina pectoris, unspecified vessel or lesion type  -     Ambulatory referral to Cardiology; Future    6  Dementia without behavioral disturbance, unspecified dementia type [facility])  -     Ambulatory referral to Geriatrics; Future  -     donepezil (ARICEPT) 5 mg tablet; Take 1 tablet (5 mg total) by mouth daily at bedtime    7  B12 deficiency  -     Vitamin B12; Future    8  Hypothyroidism, unspecified type  -     TSH, 3rd generation with Free T4 reflex; Future         Subjective:      Patient ID: Luis Miguel Adams is a 80 y o  female      HPI   Wednesday tending to flowers and fell on the stones   Right elbow and right hip   bruhing     b12 1000mcg      CAD s/p MI @ 49 yo, stable on meds  Nadolol 40 q D, 81mg ASA  Did sees caustacurda 1 / a year    Carotid artery stenosis 50-69% Left side Right <50% repeat in 6/18 stable with Kamara - repeat in 2 2021    severe AS 0 9cm  P HTN: P pressures of 45-55mmHg    HTN -controlled on meds, no CP, Solange 20 q D, , Nadolol 40mg but in ER visit 200/100 rec 10/22 to norvasc2  5 2am 1pm ( will change to 1 am and 1 to 0 to 1/2 evening)    HLD: uncontrolled on meds, no myalgias, Lipitor 20 q d    DM 2 - controlled on meds, no hypoglycemia, metformin 500 ER 2 with dinner last A1c 6 1 from 7 2 to 5 7 to 6 1 to 5 6 to 6 6 will reapeat    HERBERTH - better with meds, Diazepam 5 q d, PRN takes it about 3 times a week    Hypothyroidism - TSH 9 from 6 85 started 0 025mg better now at 5 0 then increased [id number] and could not tolerate it back to 25mcg    takes MVI and Ca 500 with Vit D 600 advised to double it    constipation: bob better with Metamucil    OP -2 5 in the lumbar spine 3/16 - does not want prolia    Anxeity: was on valium bid changed to zoloft then prozac then increased it but going through priscilla and chano    norvasc 2 5 bid  nadolol 40mg for pvc am  lisinopril 20mg am    also we will go back to prozac 10mg again which she is taking at night      Well not working for [employer]  Did well with valium prn   Will try one more ssri though - lexapro     Felt she was confused  So stopped the lexapro   But she is still having quick intermintant confusion   Lasting hours to mins     2 mwf and 1 rest   The following portions of the patient's history were reviewed and updated as appropriate: allergies, current medications, past family history, past medical history, past social history, past surgical history and problem list     Review of Systems   Constitutional: Negative for fever and unexpected weight change     HENT: Negative for nosebleeds and trouble swallowing  Eyes: Negative for visual disturbance  Respiratory: Negative for chest tightness and shortness of breath  Cardiovascular: Negative for chest pain, palpitations and leg swelling  Gastrointestinal: Negative for abdominal pain, constipation, diarrhea and nausea  Endocrine: Negative for cold intolerance  Genitourinary: Negative for dysuria and urgency  Musculoskeletal: Negative for joint swelling and myalgias  Skin: Negative for rash  Neurological: Negative for tremors, seizures and syncope  Hematological: Does not bruise/bleed easily  Psychiatric/Behavioral: Positive for behavioral problems and confusion  Negative for hallucinations and suicidal ideas  The patient is nervous/anxious  Objective:      /80 (BP Location: Left arm, Patient Position: Sitting, Cuff Size: Standard)   Pulse 76   Resp 16   Ht 5' (1 524 m)   Wt 59 kg (130 lb)   SpO2 95%   BMI 25 39 kg/m²     No visits with results within 2 Week(s) from this visit  Latest known visit with results is:   Lab on 01/21/2021   Component Date Value    Vit D, 25-Hydroxy 01/21/2021 22 7*    Cholesterol 01/21/2021 165     Triglycerides 01/21/2021 106     HDL, Direct 01/21/2021 56     LDL Calculated 01/21/2021 88     Hemoglobin A1C 01/21/2021 8 1*    EAG 01/21/2021 186     Vitamin B-12 01/21/2021 247     NT-proBNP 01/21/2021 2,381*    TSH 3RD GENERATON 01/21/2021 3 830*    Free T4 01/21/2021 0 90           Physical Exam  Vitals signs and nursing note reviewed  Constitutional:       Appearance: She is well-developed  HENT:      Head: Normocephalic and atraumatic  Neck:      Musculoskeletal: Normal range of motion and neck supple  Cardiovascular:      Rate and Rhythm: Normal rate and regular rhythm  Pulses: no weak pulses          Dorsalis pedis pulses are 2+ on the right side and 2+ on the left side  Heart sounds: Normal heart sounds  No murmur     Pulmonary:      Effort: Pulmonary effort is normal       Breath sounds: Normal breath sounds  No wheezing or rales  Abdominal:      General: Bowel sounds are normal  There is no distension  Palpations: Abdomen is soft  Tenderness: There is no abdominal tenderness  Musculoskeletal: Normal range of motion  General: No tenderness  Feet:      Right foot:      Skin integrity: Callus and dry skin present  No ulcer, skin breakdown, erythema or warmth  Left foot:      Skin integrity: Callus and dry skin present  No ulcer, skin breakdown, erythema or warmth  Lymphadenopathy:      Cervical: No cervical adenopathy  Skin:     General: Skin is warm and dry  Capillary Refill: Capillary refill takes less than 2 seconds  Findings: No rash  Neurological:      Mental Status: She is alert and oriented to person, place, and time  Cranial Nerves: No cranial nerve deficit  Sensory: No sensory deficit  Motor: No abnormal muscle tone  Psychiatric:         Behavior: Behavior normal          Thought Content: Thought content normal          Judgment: Judgment normal          BMI Counseling: Body mass index is 25 39 kg/m²  The BMI is above normal  Nutrition recommendations include decreasing portion sizes, encouraging healthy choices of fruits and vegetables and limiting drinks that contain sugar  Exercise recommendations include moderate physical activity 150 minutes/week  No pharmacotherapy was ordered  Falls Plan of Care: balance, strength, and gait training instructions were provided  Medications that increase falls were reviewed  Patient's shoes and socks removed  Right Foot/Ankle   Right Foot Inspection  Skin Exam: skin normal, skin intact, dry skin, callus and callus no warmth, no erythema, no maceration, no abnormal color, no pre-ulcer and no ulcer                          Toe Exam: ROM and strength within normal limits  Sensory       Monofilament testing: intact  Vascular  Capillary refills: < 3 seconds  The right DP pulse is 2+  Left Foot/Ankle  Left Foot Inspection  Skin Exam: skin normal, skin intact, dry skin and callusno warmth, no erythema, no maceration, normal color, no pre-ulcer and no ulcer                         Toe Exam: ROM and strength within normal limits                   Sensory       Monofilament: intact  Vascular  Capillary refills: < 3 seconds  The left DP pulse is 2+  Assign Risk Category:  No deformity present; No loss of protective sensation;  No weak pulses       Risk: [address] Arnel Singh MD  [place] [id number]

## 2021-04-13 DIAGNOSIS — E11.9 TYPE 2 DIABETES MELLITUS WITHOUT COMPLICATION, WITHOUT LONG-TERM CURRENT USE OF INSULIN (HCC): Primary | ICD-10-CM

## 2021-04-13 RX ORDER — EMPAGLIFLOZIN, METFORMIN HYDROCHLORIDE 12.5; 1 MG/1; MG/1
1 TABLET, EXTENDED RELEASE ORAL DAILY
Qty: 90 TABLET | Refills: 1 | Status: SHIPPED | OUTPATIENT
Start: 2021-04-13 | End: 2021-10-25

## 2021-05-02 DIAGNOSIS — F41.9 ANXIETY: Primary | ICD-10-CM

## 2021-05-02 RX ORDER — ALPRAZOLAM 0.25 MG/1
TABLET ORAL
Qty: 90 TABLET | Refills: 1 | Status: SHIPPED | OUTPATIENT
Start: 2021-05-02 | End: 2021-06-21

## 2021-05-07 ENCOUNTER — TRANSCRIBE ORDERS (OUTPATIENT)
Dept: LAB | Facility: CLINIC | Age: 86
End: 2021-05-07

## 2021-05-07 ENCOUNTER — APPOINTMENT (OUTPATIENT)
Dept: LAB | Facility: CLINIC | Age: 86
End: 2021-05-07
Payer: MEDICARE

## 2021-05-07 DIAGNOSIS — E53.8 B12 DEFICIENCY: ICD-10-CM

## 2021-05-07 DIAGNOSIS — E11.9 TYPE 2 DIABETES MELLITUS WITHOUT COMPLICATION, WITHOUT LONG-TERM CURRENT USE OF INSULIN (HCC): ICD-10-CM

## 2021-05-07 DIAGNOSIS — E03.9 HYPOTHYROIDISM, UNSPECIFIED TYPE: ICD-10-CM

## 2021-05-07 DIAGNOSIS — I10 ESSENTIAL HYPERTENSION: ICD-10-CM

## 2021-05-07 LAB
EST. AVERAGE GLUCOSE BLD GHB EST-MCNC: 171 MG/DL
HBA1C MFR BLD: 7.6 %
TSH SERPL DL<=0.05 MIU/L-ACNC: 2.74 UIU/ML (ref 0.36–3.74)

## 2021-05-07 PROCEDURE — 83036 HEMOGLOBIN GLYCOSYLATED A1C: CPT

## 2021-05-07 PROCEDURE — 36415 COLL VENOUS BLD VENIPUNCTURE: CPT

## 2021-05-07 PROCEDURE — 84443 ASSAY THYROID STIM HORMONE: CPT

## 2021-05-28 ENCOUNTER — TELEPHONE (OUTPATIENT)
Dept: FAMILY MEDICINE CLINIC | Facility: CLINIC | Age: 86
End: 2021-05-28

## 2021-05-28 NOTE — TELEPHONE ENCOUNTER
Rosa Stephen called in regards to her mother Daniel Toribio, stated that she's getting concerned about Oralee Jewel   Carmen Lo has been having issues sleeping since being prescribed the medication for dementia, also stated that she's been super anxious and some what depressed, stating "she doesn't want to do anything", "theres no point in going anywhere"    Please call patients daughter back

## 2021-05-31 DIAGNOSIS — R45.86 MOOD DISTURBANCE: Primary | ICD-10-CM

## 2021-06-07 ENCOUNTER — TRANSCRIBE ORDERS (OUTPATIENT)
Dept: LAB | Facility: CLINIC | Age: 86
End: 2021-06-07

## 2021-06-11 ENCOUNTER — APPOINTMENT (OUTPATIENT)
Dept: LAB | Facility: CLINIC | Age: 86
End: 2021-06-11
Payer: MEDICARE

## 2021-06-11 LAB
BACTERIA UR QL AUTO: NORMAL /HPF
BILIRUB UR QL STRIP: NEGATIVE
CLARITY UR: CLEAR
COLOR UR: YELLOW
GLUCOSE UR STRIP-MCNC: ABNORMAL MG/DL
HGB UR QL STRIP.AUTO: NEGATIVE
HYALINE CASTS #/AREA URNS LPF: NORMAL /LPF
KETONES UR STRIP-MCNC: NEGATIVE MG/DL
LEUKOCYTE ESTERASE UR QL STRIP: ABNORMAL
NITRITE UR QL STRIP: NEGATIVE
NON-SQ EPI CELLS URNS QL MICRO: NORMAL /HPF
PH UR STRIP.AUTO: 6 [PH]
PROT UR STRIP-MCNC: NEGATIVE MG/DL
RBC #/AREA URNS AUTO: NORMAL /HPF
SP GR UR STRIP.AUTO: 1.01 (ref 1–1.03)
UROBILINOGEN UR QL STRIP.AUTO: 1 E.U./DL
WBC #/AREA URNS AUTO: NORMAL /HPF

## 2021-06-11 PROCEDURE — 81001 URINALYSIS AUTO W/SCOPE: CPT | Performed by: FAMILY MEDICINE

## 2021-06-19 DIAGNOSIS — E11.9 TYPE 2 DIABETES MELLITUS WITHOUT COMPLICATION, WITHOUT LONG-TERM CURRENT USE OF INSULIN (HCC): ICD-10-CM

## 2021-06-19 DIAGNOSIS — E03.8 OTHER SPECIFIED HYPOTHYROIDISM: ICD-10-CM

## 2021-06-19 DIAGNOSIS — F41.9 ANXIETY: ICD-10-CM

## 2021-06-19 DIAGNOSIS — I10 ESSENTIAL HYPERTENSION: ICD-10-CM

## 2021-06-21 RX ORDER — ATORVASTATIN CALCIUM 40 MG/1
TABLET, FILM COATED ORAL
Qty: 90 TABLET | Refills: 3 | Status: SHIPPED | OUTPATIENT
Start: 2021-06-21 | End: 2021-07-30

## 2021-06-21 RX ORDER — LEVOTHYROXINE SODIUM 0.03 MG/1
TABLET ORAL
Qty: 90 TABLET | Refills: 1 | Status: SHIPPED | OUTPATIENT
Start: 2021-06-21 | End: 2021-07-30

## 2021-06-21 RX ORDER — NADOLOL 40 MG/1
TABLET ORAL
Qty: 90 TABLET | Refills: 1 | Status: SHIPPED | OUTPATIENT
Start: 2021-06-21 | End: 2022-03-14

## 2021-06-21 RX ORDER — ALPRAZOLAM 0.25 MG/1
TABLET ORAL
Qty: 30 TABLET | Refills: 5 | Status: SHIPPED | OUTPATIENT
Start: 2021-06-21 | End: 2021-12-22

## 2021-06-29 DIAGNOSIS — I10 ESSENTIAL HYPERTENSION: ICD-10-CM

## 2021-06-29 RX ORDER — LISINOPRIL 20 MG/1
TABLET ORAL
Qty: 90 TABLET | Refills: 1 | Status: SHIPPED | OUTPATIENT
Start: 2021-06-29 | End: 2022-03-14

## 2021-07-19 ENCOUNTER — HOSPITAL ENCOUNTER (OUTPATIENT)
Dept: NON INVASIVE DIAGNOSTICS | Facility: CLINIC | Age: 86
Discharge: HOME | End: 2021-07-19
Payer: MEDICARE

## 2021-07-19 DIAGNOSIS — I65.23 ASYMPTOMATIC BILATERAL CAROTID ARTERY STENOSIS: ICD-10-CM

## 2021-07-19 PROCEDURE — 93880 EXTRACRANIAL BILAT STUDY: CPT | Performed by: SURGERY

## 2021-07-19 PROCEDURE — 93880 EXTRACRANIAL BILAT STUDY: CPT

## 2021-08-06 ENCOUNTER — OFFICE VISIT (OUTPATIENT)
Dept: FAMILY MEDICINE CLINIC | Facility: CLINIC | Age: 86
End: 2021-08-06
Payer: MEDICARE

## 2021-08-06 VITALS
SYSTOLIC BLOOD PRESSURE: 118 MMHG | BODY MASS INDEX: 23.36 KG/M2 | RESPIRATION RATE: 16 BRPM | HEART RATE: 76 BPM | OXYGEN SATURATION: 97 % | DIASTOLIC BLOOD PRESSURE: 78 MMHG | HEIGHT: 60 IN | WEIGHT: 119 LBS

## 2021-08-06 DIAGNOSIS — E03.8 OTHER SPECIFIED HYPOTHYROIDISM: ICD-10-CM

## 2021-08-06 DIAGNOSIS — E11.9 TYPE 2 DIABETES MELLITUS WITHOUT COMPLICATION, WITHOUT LONG-TERM CURRENT USE OF INSULIN (HCC): ICD-10-CM

## 2021-08-06 DIAGNOSIS — I35.0 SEVERE AORTIC STENOSIS: ICD-10-CM

## 2021-08-06 DIAGNOSIS — I10 ESSENTIAL HYPERTENSION: ICD-10-CM

## 2021-08-06 DIAGNOSIS — F03.90 DEMENTIA WITHOUT BEHAVIORAL DISTURBANCE, UNSPECIFIED DEMENTIA TYPE (HCC): Primary | ICD-10-CM

## 2021-08-06 DIAGNOSIS — E55.9 VITAMIN D DEFICIENCY: ICD-10-CM

## 2021-08-06 DIAGNOSIS — H91.90 HEARING LOSS, UNSPECIFIED HEARING LOSS TYPE, UNSPECIFIED LATERALITY: ICD-10-CM

## 2021-08-06 DIAGNOSIS — E53.8 B12 DEFICIENCY: ICD-10-CM

## 2021-08-06 PROCEDURE — 99214 OFFICE O/P EST MOD 30 MIN: CPT | Performed by: FAMILY MEDICINE

## 2021-08-06 RX ORDER — LANOLIN ALCOHOL/MO/W.PET/CERES
CREAM (GRAM) TOPICAL DAILY
COMMUNITY

## 2021-08-06 RX ORDER — MELATONIN
1000 DAILY
COMMUNITY

## 2021-08-06 NOTE — PROGRESS NOTES
Assessment/Plan:  a1c 7 6 5/7 can repeat next week   bnp never done - lab error? Will check again   b12 not done either   Will check tsh   Mild GI upset from either metformin or aricept but memory and mood is going well      1  Dementia without behavioral disturbance, unspecified dementia type [facility])  -     Ambulatory referral to Geriatrics; Future    2  Type 2 diabetes mellitus without complication, without long-term current use of insulin (HCC)  -     HEMOGLOBIN A1C W/ EAG ESTIMATION; Future    3  Hearing loss, unspecified hearing loss type, unspecified laterality    4  B12 deficiency  -     Vitamin B12; Future    5  Other specified hypothyroidism  -     TSH, 3rd generation with Free T4 reflex; Future    6  Essential hypertension  -     NT-BNP PRO; Future    7  Vitamin D deficiency  -     Vitamin D 25 hydroxy; Future    8  Severe aortic stenosis  -     Ambulatory referral to Cardiology; Future         Subjective:      Patient ID: Lovetta Angelucci is a 80 y o  female  HPI     Needs follow up on cardio   And htn   And aricept with geriatrician malcom   Vit D and xigduo     b12 1000mcg      CAD s/p MI @ 49 yo, stable on meds  Nadolol 40 q D, 81mg ASA  Did sees richa 1 / a year in the past but has nt seen him a long time   Needs new cardio     Carotid artery stenosis 50-69% Left side Right <50% repeat in 6/18 stable with Kamara - repeat in 2 2021    severe AS 0 9cm  P HTN: P pressures of 45-55mmHg    HTN -controlled on meds, no CP, Solange 20 q D, , Nadolol 40mg but in ER visit 200/100 rec 10/22 to norvasc2 512am 1pm    HLD: uncontrolled on meds, no myalgias, Lipitor 20 q d    DM 2 - controlled on meds, no hypoglycemia, metformin 500 ER 2 with dinner last A1c 6 1 from 7 2 to 5 7 to 6 1 to 5 6 to 6 6 will reapeat    HERBERTH - better with meds, xaanx 0 25 qd prn     Hypothyroidism - TSH 9 from 6 85 started 0 025mg better now at 5 0 then increased [id number] and could not tolerate it back to 25mcg    takes MVI and Ca 500 with Vit D 600 advised to double it    constipation: bob better with Metamucil    OP -2 5 in the lumbar spine 3/16 - does not want prolia    Anxeity: was on valium bid changed to zoloft then prozac then increased it but going through priscilla and chano    norvasc 2 5 bid  nadolol 40mg for pvc am  lisinopril 20mg am    also we will go back to prozac 10mg again which she is taking at night      Well not working for anxeity  Did well with valium prn   Will try one more ssri though - lexapro     Felt she was confused  So stopped the lexapro   But she is still having quick intermintant confusion   Lasting hours to mins     2 mwf and 1 rest     Carotid artery doppler <50% BL on 7/19/21    The following portions of the patient's history were reviewed and updated as appropriate: allergies, current medications, past family history, past medical history, past social history, past surgical history and problem list     Review of Systems   Constitutional: Negative for fever and unexpected weight change  HENT: Negative for nosebleeds and trouble swallowing  Eyes: Negative for visual disturbance  Respiratory: Negative for chest tightness and shortness of breath  Cardiovascular: Negative for chest pain, palpitations and leg swelling  Gastrointestinal: Negative for abdominal pain, constipation, diarrhea and nausea  Endocrine: Negative for cold intolerance  Genitourinary: Negative for dysuria and urgency  Musculoskeletal: Negative for joint swelling and myalgias  Skin: Negative for rash  Neurological: Negative for tremors, seizures and syncope  Hematological: Does not bruise/bleed easily  Psychiatric/Behavioral: Negative for hallucinations and suicidal ideas           Objective:      /78 (BP Location: Right arm, Patient Position: Sitting, Cuff Size: Standard)   Pulse 76   Resp 16   Ht 5' (1 524 m)   Wt 54 kg (119 lb)   SpO2 97%   BMI 23 24 kg/m²     No visits with results within 2 Week(s) from this visit  Latest known visit with results is:   Orders Only on 05/31/2021   Component Date Value    Color, UA 06/11/2021 Yellow     Clarity, UA 06/11/2021 Clear     Specific Gravity, UA 06/11/2021 1 013     pH, UA 06/11/2021 6 0     Leukocytes, UA 06/11/2021 Elevated glucose may cause decreased leukocyte values  See urine microscopic for [facility] result/*    Nitrite, UA 06/11/2021 Negative     Protein, UA 06/11/2021 Negative     Glucose, UA 06/11/2021 >=1000 (1%)*    Ketones, UA 06/11/2021 Negative     Urobilinogen, UA 06/11/2021 1 0     Bilirubin, UA 06/11/2021 Negative     Blood, UA 06/11/2021 Negative     RBC, UA 06/11/2021 None Seen     WBC, UA 06/11/2021 None Seen     Epithelial Cells 06/11/2021 None Seen     Bacteria, UA 06/11/2021 None Seen     Hyaline Casts, UA 06/11/2021 None Seen           Physical Exam  Vitals and nursing note reviewed  Constitutional:       Appearance: She is well-developed  HENT:      Head: Normocephalic and atraumatic  Cardiovascular:      Rate and Rhythm: Normal rate and regular rhythm  Heart sounds: Murmur heard  Pulmonary:      Effort: Pulmonary effort is normal       Breath sounds: Normal breath sounds  No wheezing or rales  Abdominal:      General: Bowel sounds are normal  There is no distension  Palpations: Abdomen is soft  Tenderness: There is no abdominal tenderness  Musculoskeletal:         General: No tenderness  Normal range of motion  Cervical back: Normal range of motion and neck supple  Lymphadenopathy:      Cervical: No cervical adenopathy  Skin:     General: Skin is warm and dry  Capillary Refill: Capillary refill takes less than 2 seconds  Findings: No rash  Neurological:      Mental Status: She is alert and oriented to person, place, and time  Cranial Nerves: No cranial nerve deficit  Sensory: No sensory deficit  Motor: No abnormal muscle tone     Psychiatric:         Behavior: Behavior normal          Thought Content:  Thought content normal          Judgment: Judgment normal              Mathew Willis MD  [place] [id number]

## 2021-08-30 ENCOUNTER — APPOINTMENT (OUTPATIENT)
Dept: LAB | Facility: CLINIC | Age: 86
End: 2021-08-30
Payer: MEDICARE

## 2021-08-30 DIAGNOSIS — E53.8 B12 DEFICIENCY: ICD-10-CM

## 2021-08-30 DIAGNOSIS — E03.8 OTHER SPECIFIED HYPOTHYROIDISM: ICD-10-CM

## 2021-08-30 DIAGNOSIS — E11.9 TYPE 2 DIABETES MELLITUS WITHOUT COMPLICATION, WITHOUT LONG-TERM CURRENT USE OF INSULIN (HCC): ICD-10-CM

## 2021-08-30 DIAGNOSIS — E55.9 VITAMIN D DEFICIENCY: ICD-10-CM

## 2021-08-30 DIAGNOSIS — I10 ESSENTIAL HYPERTENSION: ICD-10-CM

## 2021-08-30 LAB
25(OH)D3 SERPL-MCNC: 58.6 NG/ML (ref 30–100)
EST. AVERAGE GLUCOSE BLD GHB EST-MCNC: 148 MG/DL
HBA1C MFR BLD: 6.8 %
NT-PROBNP SERPL-MCNC: 1239 PG/ML
TSH SERPL DL<=0.05 MIU/L-ACNC: 2.41 UIU/ML (ref 0.36–3.74)
VIT B12 SERPL-MCNC: 1099 PG/ML (ref 100–900)

## 2021-08-30 PROCEDURE — 83880 ASSAY OF NATRIURETIC PEPTIDE: CPT

## 2021-08-30 PROCEDURE — 36415 COLL VENOUS BLD VENIPUNCTURE: CPT

## 2021-08-30 PROCEDURE — 82607 VITAMIN B-12: CPT

## 2021-08-30 PROCEDURE — 83036 HEMOGLOBIN GLYCOSYLATED A1C: CPT

## 2021-08-30 PROCEDURE — 84443 ASSAY THYROID STIM HORMONE: CPT

## 2021-08-30 PROCEDURE — 82306 VITAMIN D 25 HYDROXY: CPT

## 2021-09-02 ENCOUNTER — TELEPHONE (OUTPATIENT)
Dept: FAMILY MEDICINE CLINIC | Facility: CLINIC | Age: 86
End: 2021-09-02

## 2021-09-02 NOTE — TELEPHONE ENCOUNTER
----- Message from Aaron Robertson MD sent at 9/2/2021  2:22 PM EDT -----  Thyroid is fine   And vit D drop by 1/2 the dose now   So if she is 5K then 2k daily is fine

## 2021-09-06 DIAGNOSIS — E11.9 TYPE 2 DIABETES MELLITUS WITHOUT COMPLICATION, WITHOUT LONG-TERM CURRENT USE OF INSULIN (HCC): ICD-10-CM

## 2021-09-07 RX ORDER — BLOOD SUGAR DIAGNOSTIC
STRIP MISCELLANEOUS
Qty: 100 STRIP | Refills: 11 | Status: SHIPPED | OUTPATIENT
Start: 2021-09-07 | End: 2022-05-03

## 2021-09-18 DIAGNOSIS — F03.90 DEMENTIA WITHOUT BEHAVIORAL DISTURBANCE, UNSPECIFIED DEMENTIA TYPE (HCC): ICD-10-CM

## 2021-09-20 ENCOUNTER — TELEPHONE (OUTPATIENT)
Dept: FAMILY MEDICINE CLINIC | Facility: CLINIC | Age: 86
End: 2021-09-20

## 2021-09-20 RX ORDER — DONEPEZIL HYDROCHLORIDE 5 MG/1
TABLET, FILM COATED ORAL
Qty: 90 TABLET | Refills: 1 | Status: SHIPPED | OUTPATIENT
Start: 2021-09-20 | End: 2022-01-28

## 2021-09-20 NOTE — TELEPHONE ENCOUNTER
Cheryle, patient's daughter called to say that she's having a yeast infection and she thinks its due to the synjardy  She wants to know if you can switch her to metformin    Please advise

## 2021-09-21 NOTE — TELEPHONE ENCOUNTER
Not quite yet   Gave her diflucan   I would lke her to use a wipe after urinating to wipe away the extra sugar that she is not peeing out every time she urinates  Also if she gets 3 infections we can stop it   Thasts the magic number

## 2021-10-04 DIAGNOSIS — E11.9 TYPE 2 DIABETES MELLITUS WITHOUT COMPLICATION, WITHOUT LONG-TERM CURRENT USE OF INSULIN (HCC): ICD-10-CM

## 2021-10-04 RX ORDER — LANCETS 28 GAUGE
EACH MISCELLANEOUS
Qty: 100 EACH | Refills: 11 | Status: SHIPPED | OUTPATIENT
Start: 2021-10-04

## 2021-10-22 DIAGNOSIS — E11.9 TYPE 2 DIABETES MELLITUS WITHOUT COMPLICATION, WITHOUT LONG-TERM CURRENT USE OF INSULIN (HCC): ICD-10-CM

## 2021-10-25 RX ORDER — EMPAGLIFLOZIN, METFORMIN HYDROCHLORIDE 12.5; 1 MG/1; MG/1
TABLET, EXTENDED RELEASE ORAL
Qty: 90 TABLET | Refills: 1 | Status: SHIPPED | OUTPATIENT
Start: 2021-10-25 | End: 2021-11-04

## 2021-10-27 ENCOUNTER — IMMUNIZATIONS (OUTPATIENT)
Dept: FAMILY MEDICINE CLINIC | Facility: HOSPITAL | Age: 86
End: 2021-10-27

## 2021-10-27 DIAGNOSIS — Z23 ENCOUNTER FOR IMMUNIZATION: Primary | ICD-10-CM

## 2021-12-22 DIAGNOSIS — F41.9 ANXIETY: ICD-10-CM

## 2021-12-22 RX ORDER — ALPRAZOLAM 0.25 MG/1
TABLET ORAL
Qty: 30 TABLET | Refills: 5 | Status: SHIPPED | OUTPATIENT
Start: 2021-12-22 | End: 2022-06-14

## 2022-01-01 ENCOUNTER — HOSPITAL ENCOUNTER (EMERGENCY)
Facility: HOSPITAL | Age: 87
Discharge: HOME | End: 2022-08-05
Attending: EMERGENCY MEDICINE
Payer: COMMERCIAL

## 2022-01-01 ENCOUNTER — HOME CARE VISIT (OUTPATIENT)
Dept: HOME HOSPICE | Facility: HOSPICE | Age: 87
End: 2022-01-01
Payer: MEDICARE

## 2022-01-01 ENCOUNTER — TELEPHONE (OUTPATIENT)
Dept: FAMILY MEDICINE CLINIC | Facility: CLINIC | Age: 87
End: 2022-01-01

## 2022-01-01 ENCOUNTER — HOME CARE VISIT (OUTPATIENT)
Dept: HOME HEALTH SERVICES | Facility: HOME HEALTHCARE | Age: 87
End: 2022-01-01
Payer: MEDICARE

## 2022-01-01 ENCOUNTER — EPISODE CHANGES (OUTPATIENT)
Dept: CASE MANAGEMENT | Facility: OTHER | Age: 87
End: 2022-01-01

## 2022-01-01 ENCOUNTER — PATIENT OUTREACH (OUTPATIENT)
Dept: FAMILY MEDICINE CLINIC | Facility: CLINIC | Age: 87
End: 2022-01-01

## 2022-01-01 ENCOUNTER — TELEPHONE (OUTPATIENT)
Dept: CARDIOLOGY CLINIC | Facility: CLINIC | Age: 87
End: 2022-01-01

## 2022-01-01 ENCOUNTER — APPOINTMENT (EMERGENCY)
Dept: RADIOLOGY | Facility: HOSPITAL | Age: 87
DRG: 291 | End: 2022-01-01
Payer: COMMERCIAL

## 2022-01-01 ENCOUNTER — TRANSITIONAL CARE MANAGEMENT (OUTPATIENT)
Dept: FAMILY MEDICINE CLINIC | Facility: CLINIC | Age: 87
End: 2022-01-01

## 2022-01-01 ENCOUNTER — APPOINTMENT (EMERGENCY)
Dept: CT IMAGING | Facility: HOSPITAL | Age: 87
End: 2022-01-01
Payer: COMMERCIAL

## 2022-01-01 ENCOUNTER — HOSPITAL ENCOUNTER (INPATIENT)
Facility: HOSPITAL | Age: 87
LOS: 2 days | Discharge: HOME | DRG: 291 | End: 2022-07-10
Attending: EMERGENCY MEDICINE | Admitting: HOSPITALIST
Payer: COMMERCIAL

## 2022-01-01 ENCOUNTER — APPOINTMENT (EMERGENCY)
Dept: RADIOLOGY | Facility: HOSPITAL | Age: 87
End: 2022-01-01
Payer: COMMERCIAL

## 2022-01-01 ENCOUNTER — HOSPITAL ENCOUNTER (EMERGENCY)
Facility: HOSPITAL | Age: 87
Discharge: HOSPICE | End: 2022-08-16
Attending: EMERGENCY MEDICINE | Admitting: INTERNAL MEDICINE
Payer: COMMERCIAL

## 2022-01-01 ENCOUNTER — OFFICE VISIT (OUTPATIENT)
Dept: CARDIOLOGY CLINIC | Facility: CLINIC | Age: 87
End: 2022-01-01
Payer: COMMERCIAL

## 2022-01-01 ENCOUNTER — OFFICE VISIT (OUTPATIENT)
Dept: FAMILY MEDICINE CLINIC | Facility: CLINIC | Age: 87
End: 2022-01-01
Payer: COMMERCIAL

## 2022-01-01 ENCOUNTER — APPOINTMENT (INPATIENT)
Dept: NON INVASIVE DIAGNOSTICS | Facility: HOSPITAL | Age: 87
DRG: 291 | End: 2022-01-01
Payer: COMMERCIAL

## 2022-01-01 ENCOUNTER — APPOINTMENT (OUTPATIENT)
Dept: LAB | Facility: CLINIC | Age: 87
End: 2022-01-01
Payer: COMMERCIAL

## 2022-01-01 ENCOUNTER — HOSPITAL ENCOUNTER (INPATIENT)
Facility: HOSPITAL | Age: 87
LOS: 3 days | Discharge: HOME | DRG: 309 | End: 2022-07-24
Attending: EMERGENCY MEDICINE | Admitting: INTERNAL MEDICINE
Payer: COMMERCIAL

## 2022-01-01 ENCOUNTER — HOSPICE ADMISSION (OUTPATIENT)
Dept: HOME HOSPICE | Facility: HOSPICE | Age: 87
End: 2022-01-01
Payer: MEDICARE

## 2022-01-01 ENCOUNTER — APPOINTMENT (EMERGENCY)
Dept: RADIOLOGY | Facility: HOSPITAL | Age: 87
DRG: 309 | End: 2022-01-01
Payer: COMMERCIAL

## 2022-01-01 ENCOUNTER — APPOINTMENT (INPATIENT)
Dept: RADIOLOGY | Facility: HOSPITAL | Age: 87
DRG: 309 | End: 2022-01-01
Payer: COMMERCIAL

## 2022-01-01 VITALS — HEART RATE: 86 BPM | RESPIRATION RATE: 16 BRPM | DIASTOLIC BLOOD PRESSURE: 64 MMHG | SYSTOLIC BLOOD PRESSURE: 105 MMHG

## 2022-01-01 VITALS
HEART RATE: 95 BPM | SYSTOLIC BLOOD PRESSURE: 110 MMHG | DIASTOLIC BLOOD PRESSURE: 72 MMHG | HEIGHT: 60 IN | OXYGEN SATURATION: 96 % | WEIGHT: 110 LBS | BODY MASS INDEX: 21.6 KG/M2 | RESPIRATION RATE: 16 BRPM

## 2022-01-01 VITALS
DIASTOLIC BLOOD PRESSURE: 68 MMHG | BODY MASS INDEX: 21.07 KG/M2 | OXYGEN SATURATION: 99 % | SYSTOLIC BLOOD PRESSURE: 138 MMHG | HEIGHT: 60 IN | HEART RATE: 112 BPM | WEIGHT: 107.3 LBS

## 2022-01-01 VITALS
TEMPERATURE: 97.7 F | HEART RATE: 112 BPM | RESPIRATION RATE: 16 BRPM | SYSTOLIC BLOOD PRESSURE: 102 MMHG | DIASTOLIC BLOOD PRESSURE: 70 MMHG

## 2022-01-01 VITALS
HEART RATE: 100 BPM | TEMPERATURE: 97.7 F | SYSTOLIC BLOOD PRESSURE: 141 MMHG | OXYGEN SATURATION: 96 % | RESPIRATION RATE: 18 BRPM | DIASTOLIC BLOOD PRESSURE: 77 MMHG

## 2022-01-01 VITALS
RESPIRATION RATE: 16 BRPM | WEIGHT: 108 LBS | TEMPERATURE: 97.9 F | HEART RATE: 73 BPM | HEIGHT: 60 IN | SYSTOLIC BLOOD PRESSURE: 147 MMHG | OXYGEN SATURATION: 91 % | BODY MASS INDEX: 21.2 KG/M2 | DIASTOLIC BLOOD PRESSURE: 83 MMHG

## 2022-01-01 VITALS — TEMPERATURE: 98.1 F | RESPIRATION RATE: 16 BRPM

## 2022-01-01 VITALS
TEMPERATURE: 97.8 F | HEIGHT: 60 IN | DIASTOLIC BLOOD PRESSURE: 75 MMHG | OXYGEN SATURATION: 95 % | HEART RATE: 67 BPM | WEIGHT: 111.11 LBS | BODY MASS INDEX: 21.81 KG/M2 | RESPIRATION RATE: 18 BRPM | SYSTOLIC BLOOD PRESSURE: 150 MMHG

## 2022-01-01 VITALS
OXYGEN SATURATION: 99 % | BODY MASS INDEX: 22.6 KG/M2 | RESPIRATION RATE: 20 BRPM | HEART RATE: 102 BPM | SYSTOLIC BLOOD PRESSURE: 118 MMHG | WEIGHT: 115.74 LBS | DIASTOLIC BLOOD PRESSURE: 91 MMHG

## 2022-01-01 VITALS — RESPIRATION RATE: 20 BRPM | TEMPERATURE: 96.7 F

## 2022-01-01 VITALS
SYSTOLIC BLOOD PRESSURE: 110 MMHG | DIASTOLIC BLOOD PRESSURE: 70 MMHG | OXYGEN SATURATION: 97 % | HEART RATE: 83 BPM | WEIGHT: 114.7 LBS | HEIGHT: 60 IN | BODY MASS INDEX: 22.52 KG/M2

## 2022-01-01 DIAGNOSIS — F03.90 DEMENTIA WITHOUT BEHAVIORAL DISTURBANCE, UNSPECIFIED DEMENTIA TYPE: ICD-10-CM

## 2022-01-01 DIAGNOSIS — M81.0 AGE-RELATED OSTEOPOROSIS WITHOUT CURRENT PATHOLOGICAL FRACTURE: ICD-10-CM

## 2022-01-01 DIAGNOSIS — E03.9 HYPOTHYROIDISM, UNSPECIFIED TYPE: ICD-10-CM

## 2022-01-01 DIAGNOSIS — I50.32 CHRONIC DIASTOLIC HEART FAILURE (HCC): ICD-10-CM

## 2022-01-01 DIAGNOSIS — R06.00 DYSPNEA: Primary | ICD-10-CM

## 2022-01-01 DIAGNOSIS — I48.91 ATRIAL FIBRILLATION WITH RAPID VENTRICULAR RESPONSE (HCC): ICD-10-CM

## 2022-01-01 DIAGNOSIS — I35.0 SEVERE AORTIC STENOSIS: ICD-10-CM

## 2022-01-01 DIAGNOSIS — I10 ESSENTIAL HYPERTENSION: ICD-10-CM

## 2022-01-01 DIAGNOSIS — I50.33 ACUTE ON CHRONIC DIASTOLIC CONGESTIVE HEART FAILURE (HCC): ICD-10-CM

## 2022-01-01 DIAGNOSIS — Z09 HOSPITAL DISCHARGE FOLLOW-UP: Primary | ICD-10-CM

## 2022-01-01 DIAGNOSIS — I48.91 ATRIAL FIBRILLATION (HCC): ICD-10-CM

## 2022-01-01 DIAGNOSIS — R53.1 GENERALIZED WEAKNESS: ICD-10-CM

## 2022-01-01 DIAGNOSIS — I25.10 CORONARY ARTERY DISEASE INVOLVING NATIVE CORONARY ARTERY OF NATIVE HEART WITHOUT ANGINA PECTORIS: ICD-10-CM

## 2022-01-01 DIAGNOSIS — J96.01 ACUTE RESPIRATORY FAILURE WITH HYPOXIA (HCC): ICD-10-CM

## 2022-01-01 DIAGNOSIS — N18.31 STAGE 3A CHRONIC KIDNEY DISEASE (HCC): ICD-10-CM

## 2022-01-01 DIAGNOSIS — I50.32 CHRONIC HEART FAILURE WITH PRESERVED EJECTION FRACTION (HCC): Primary | ICD-10-CM

## 2022-01-01 DIAGNOSIS — D72.829 LEUKOCYTOSIS, UNSPECIFIED TYPE: ICD-10-CM

## 2022-01-01 DIAGNOSIS — I48.91 ATRIAL FIBRILLATION WITH RAPID VENTRICULAR RESPONSE (HCC): Primary | ICD-10-CM

## 2022-01-01 DIAGNOSIS — I48.91 NEW ONSET ATRIAL FIBRILLATION (HCC): ICD-10-CM

## 2022-01-01 DIAGNOSIS — F41.9 ANXIETY: ICD-10-CM

## 2022-01-01 DIAGNOSIS — N17.9 ACUTE KIDNEY INJURY SUPERIMPOSED ON CHRONIC KIDNEY DISEASE (HCC): ICD-10-CM

## 2022-01-01 DIAGNOSIS — I50.32 CHRONIC HEART FAILURE WITH PRESERVED EJECTION FRACTION (HCC): ICD-10-CM

## 2022-01-01 DIAGNOSIS — R41.3 ACUTE MEMORY IMPAIRMENT: Primary | ICD-10-CM

## 2022-01-01 DIAGNOSIS — E11.9 TYPE 2 DIABETES MELLITUS WITHOUT COMPLICATION, WITHOUT LONG-TERM CURRENT USE OF INSULIN (HCC): ICD-10-CM

## 2022-01-01 DIAGNOSIS — R09.02 HYPOXIA: ICD-10-CM

## 2022-01-01 DIAGNOSIS — I27.20 PULMONARY HYPERTENSION (HCC): ICD-10-CM

## 2022-01-01 DIAGNOSIS — I50.33 ACUTE ON CHRONIC DIASTOLIC CONGESTIVE HEART FAILURE (HCC): Primary | ICD-10-CM

## 2022-01-01 DIAGNOSIS — I35.0 AORTIC STENOSIS, SEVERE: ICD-10-CM

## 2022-01-01 DIAGNOSIS — I48.91 ATRIAL FIBRILLATION, UNSPECIFIED TYPE (HCC): ICD-10-CM

## 2022-01-01 DIAGNOSIS — F03.90 DEMENTIA WITHOUT BEHAVIORAL DISTURBANCE, UNSPECIFIED DEMENTIA TYPE: Primary | ICD-10-CM

## 2022-01-01 DIAGNOSIS — I10 HYPERTENSION, UNSPECIFIED TYPE: ICD-10-CM

## 2022-01-01 DIAGNOSIS — E53.8 B12 DEFICIENCY: ICD-10-CM

## 2022-01-01 DIAGNOSIS — E87.1 HYPONATREMIA: ICD-10-CM
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
## 2022-01-01 DIAGNOSIS — Z71.89 GOALS OF CARE, COUNSELING/DISCUSSION: ICD-10-CM

## 2022-01-01 DIAGNOSIS — E03.9 HYPOTHYROIDISM, UNSPECIFIED TYPE: Primary | ICD-10-CM

## 2022-01-01 DIAGNOSIS — E55.9 VITAMIN D DEFICIENCY: ICD-10-CM

## 2022-01-01 DIAGNOSIS — N18.9 ACUTE KIDNEY INJURY SUPERIMPOSED ON CHRONIC KIDNEY DISEASE (HCC): ICD-10-CM

## 2022-01-01 DIAGNOSIS — I10 PRIMARY HYPERTENSION: ICD-10-CM

## 2022-01-01 DIAGNOSIS — E03.8 OTHER SPECIFIED HYPOTHYROIDISM: ICD-10-CM

## 2022-01-01 DIAGNOSIS — N17.9 AKI (ACUTE KIDNEY INJURY) (HCC): ICD-10-CM

## 2022-01-01 DIAGNOSIS — I50.1 PULMONARY EDEMA CARDIAC CAUSE (HCC): ICD-10-CM

## 2022-01-01 LAB
25(OH)D3 SERPL-MCNC: 41.4 NG/ML (ref 30–100)
2HR DELTA HS TROPONIN: 28 NG/L
2HR DELTA HS TROPONIN: 47 NG/L
2HR DELTA HS TROPONIN: 5 NG/L
4HR DELTA HS TROPONIN: 115 NG/L
4HR DELTA HS TROPONIN: 37 NG/L
ABO GROUP BLD: NORMAL
ALBUMIN SERPL BCP-MCNC: 3.8 G/DL (ref 3.5–5)
ALBUMIN SERPL BCP-MCNC: 3.9 G/DL (ref 3.5–5)
ALP SERPL-CCNC: 72 U/L (ref 34–104)
ALP SERPL-CCNC: 85 U/L (ref 34–104)
ALT SERPL W P-5'-P-CCNC: 11 U/L (ref 7–52)
ALT SERPL W P-5'-P-CCNC: 12 U/L (ref 7–52)
ANION GAP SERPL CALCULATED.3IONS-SCNC: 10 MMOL/L (ref 4–13)
ANION GAP SERPL CALCULATED.3IONS-SCNC: 11 MMOL/L (ref 4–13)
ANION GAP SERPL CALCULATED.3IONS-SCNC: 12 MMOL/L (ref 4–13)
ANION GAP SERPL CALCULATED.3IONS-SCNC: 14 MMOL/L (ref 4–13)
ANION GAP SERPL CALCULATED.3IONS-SCNC: 14 MMOL/L (ref 4–13)
ANION GAP SERPL CALCULATED.3IONS-SCNC: 5 MMOL/L (ref 4–13)
ANION GAP SERPL CALCULATED.3IONS-SCNC: 7 MMOL/L (ref 4–13)
ANION GAP SERPL CALCULATED.3IONS-SCNC: 7 MMOL/L (ref 4–13)
ANION GAP SERPL CALCULATED.3IONS-SCNC: 8 MMOL/L (ref 4–13)
ANION GAP SERPL CALCULATED.3IONS-SCNC: 9 MMOL/L (ref 4–13)
AORTIC ROOT: 2.6 CM
AORTIC VALVE MEAN VELOCITY: 32.2 M/S
APICAL FOUR CHAMBER EJECTION FRACTION: 55 %
APTT PPP: 35 SECONDS (ref 23–37)
ASCENDING AORTA: 2.9 CM
AST SERPL W P-5'-P-CCNC: 17 U/L (ref 13–39)
AST SERPL W P-5'-P-CCNC: 21 U/L (ref 13–39)
ATRIAL RATE: 144 BPM
ATRIAL RATE: 152 BPM
ATRIAL RATE: 58 BPM
ATRIAL RATE: 61 BPM
ATRIAL RATE: 76 BPM
AV AREA BY CONTINUOUS VTI: 0.5 CM2
AV AREA PEAK VELOCITY: 0.5 CM2
AV LVOT MEAN GRADIENT: 2 MMHG
AV LVOT PEAK GRADIENT: 4 MMHG
AV MEAN GRADIENT: 45 MMHG
AV PEAK GRADIENT: 67 MMHG
AV VALVE AREA: 0.55 CM2
AV VELOCITY RATIO: 0.23
BACTERIA BLD CULT: NORMAL
BACTERIA BLD CULT: NORMAL
BACTERIA UR QL AUTO: ABNORMAL /HPF
BACTERIA UR QL AUTO: NORMAL /HPF
BASE EXCESS BLDA CALC-SCNC: -4 MMOL/L (ref -2–3)
BASOPHILS # BLD AUTO: 0.04 THOUSANDS/ΜL (ref 0–0.1)
BASOPHILS # BLD AUTO: 0.05 THOUSANDS/ΜL (ref 0–0.1)
BASOPHILS # BLD AUTO: 0.06 THOUSANDS/ΜL (ref 0–0.1)
BASOPHILS # BLD AUTO: 0.06 THOUSANDS/ΜL (ref 0–0.1)
BASOPHILS # BLD AUTO: 0.08 THOUSANDS/ΜL (ref 0–0.1)
BASOPHILS NFR BLD AUTO: 0 % (ref 0–1)
BASOPHILS NFR BLD AUTO: 0 % (ref 0–1)
BASOPHILS NFR BLD AUTO: 1 % (ref 0–1)
BILIRUB SERPL-MCNC: 0.55 MG/DL (ref 0.2–1)
BILIRUB SERPL-MCNC: 0.91 MG/DL (ref 0.2–1)
BILIRUB UR QL STRIP: NEGATIVE
BILIRUB UR QL STRIP: NEGATIVE
BLD GP AB SCN SERPL QL: NEGATIVE
BNP SERPL-MCNC: 1554 PG/ML (ref 0–100)
BNP SERPL-MCNC: 343 PG/ML (ref 0–100)
BNP SERPL-MCNC: 436 PG/ML (ref 0–100)
BUN SERPL-MCNC: 16 MG/DL (ref 5–25)
BUN SERPL-MCNC: 16 MG/DL (ref 5–25)
BUN SERPL-MCNC: 20 MG/DL (ref 5–25)
BUN SERPL-MCNC: 22 MG/DL (ref 5–25)
BUN SERPL-MCNC: 23 MG/DL (ref 5–25)
BUN SERPL-MCNC: 24 MG/DL (ref 5–25)
BUN SERPL-MCNC: 25 MG/DL (ref 5–25)
BUN SERPL-MCNC: 63 MG/DL (ref 5–25)
BUN SERPL-MCNC: 67 MG/DL (ref 5–25)
BUN SERPL-MCNC: 9 MG/DL (ref 5–25)
CA-I BLD-SCNC: 0.99 MMOL/L (ref 1.12–1.32)
CALCIUM SERPL-MCNC: 9 MG/DL (ref 8.4–10.2)
CALCIUM SERPL-MCNC: 9 MG/DL (ref 8.4–10.2)
CALCIUM SERPL-MCNC: 9.3 MG/DL (ref 8.4–10.2)
CALCIUM SERPL-MCNC: 9.4 MG/DL (ref 8.4–10.2)
CALCIUM SERPL-MCNC: 9.5 MG/DL (ref 8.4–10.2)
CALCIUM SERPL-MCNC: 9.5 MG/DL (ref 8.4–10.2)
CALCIUM SERPL-MCNC: 9.6 MG/DL (ref 8.3–10.1)
CALCIUM SERPL-MCNC: 9.7 MG/DL (ref 8.4–10.2)
CALCIUM SERPL-MCNC: 9.8 MG/DL (ref 8.3–10.1)
CALCIUM SERPL-MCNC: 9.8 MG/DL (ref 8.3–10.1)
CARDIAC TROPONIN I PNL SERPL HS: 147 NG/L
CARDIAC TROPONIN I PNL SERPL HS: 147 NG/L (ref 8–18)
CARDIAC TROPONIN I PNL SERPL HS: 26 NG/L
CARDIAC TROPONIN I PNL SERPL HS: 31 NG/L
CARDIAC TROPONIN I PNL SERPL HS: 32 NG/L
CARDIAC TROPONIN I PNL SERPL HS: 37 NG/L
CARDIAC TROPONIN I PNL SERPL HS: 46 NG/L
CARDIAC TROPONIN I PNL SERPL HS: 79 NG/L
CARDIAC TROPONIN I PNL SERPL HS: 9 NG/L
CHLORIDE SERPL-SCNC: 101 MMOL/L (ref 100–108)
CHLORIDE SERPL-SCNC: 101 MMOL/L (ref 100–108)
CHLORIDE SERPL-SCNC: 101 MMOL/L (ref 96–108)
CHLORIDE SERPL-SCNC: 103 MMOL/L (ref 96–108)
CHLORIDE SERPL-SCNC: 94 MMOL/L (ref 96–108)
CHLORIDE SERPL-SCNC: 96 MMOL/L (ref 96–108)
CHLORIDE SERPL-SCNC: 97 MMOL/L (ref 96–108)
CHLORIDE SERPL-SCNC: 97 MMOL/L (ref 96–108)
CHLORIDE SERPL-SCNC: 98 MMOL/L (ref 96–108)
CHLORIDE SERPL-SCNC: 98 MMOL/L (ref 96–108)
CHOLEST SERPL-MCNC: 124 MG/DL
CLARITY UR: CLEAR
CLARITY UR: CLEAR
CO2 SERPL-SCNC: 21 MMOL/L (ref 21–32)
CO2 SERPL-SCNC: 25 MMOL/L (ref 21–32)
CO2 SERPL-SCNC: 25 MMOL/L (ref 21–32)
CO2 SERPL-SCNC: 26 MMOL/L (ref 21–32)
CO2 SERPL-SCNC: 28 MMOL/L (ref 21–32)
CO2 SERPL-SCNC: 28 MMOL/L (ref 21–32)
COLOR UR: ABNORMAL
COLOR UR: COLORLESS
CREAT SERPL-MCNC: 0.79 MG/DL (ref 0.6–1.3)
CREAT SERPL-MCNC: 0.79 MG/DL (ref 0.6–1.3)
CREAT SERPL-MCNC: 0.82 MG/DL (ref 0.6–1.3)
CREAT SERPL-MCNC: 0.83 MG/DL (ref 0.6–1.3)
CREAT SERPL-MCNC: 0.95 MG/DL (ref 0.6–1.3)
CREAT SERPL-MCNC: 1.03 MG/DL (ref 0.6–1.3)
CREAT SERPL-MCNC: 1.06 MG/DL (ref 0.6–1.3)
CREAT SERPL-MCNC: 1.06 MG/DL (ref 0.6–1.3)
CREAT SERPL-MCNC: 1.71 MG/DL (ref 0.6–1.3)
CREAT SERPL-MCNC: 2.09 MG/DL (ref 0.6–1.3)
DIGOXIN SERPL-MCNC: 1.1 NG/ML (ref 0.8–2)
DME PARACHUTE DELIVERY DATE REQUESTED: NORMAL
DME PARACHUTE ITEM DESCRIPTION: NORMAL
DME PARACHUTE ITEM DESCRIPTION: NORMAL
DME PARACHUTE ORDER STATUS: NORMAL
DME PARACHUTE SUPPLIER NAME: NORMAL
DME PARACHUTE SUPPLIER PHONE: NORMAL
DOP CALC AO PEAK VEL: 4.08 M/S
DOP CALC AO VTI: 100.69 CM
DOP CALC LVOT AREA: 2.01 CM2
DOP CALC LVOT DIAMETER: 1.6 CM
DOP CALC LVOT PEAK VEL VTI: 27.52 CM
DOP CALC LVOT PEAK VEL: 0.95 M/S
DOP CALC LVOT STROKE INDEX: 36.5 ML/M2
DOP CALC LVOT STROKE VOLUME: 55.3
DOP CALC MV VTI: 232.37 CM
E WAVE DECELERATION TIME: 223 MS
EOSINOPHIL # BLD AUTO: 0 THOUSAND/ΜL (ref 0–0.61)
EOSINOPHIL # BLD AUTO: 0.01 THOUSAND/ΜL (ref 0–0.61)
EOSINOPHIL # BLD AUTO: 0.04 THOUSAND/ΜL (ref 0–0.61)
EOSINOPHIL # BLD AUTO: 0.06 THOUSAND/ΜL (ref 0–0.61)
EOSINOPHIL # BLD AUTO: 0.14 THOUSAND/ΜL (ref 0–0.61)
EOSINOPHIL NFR BLD AUTO: 0 % (ref 0–6)
EOSINOPHIL NFR BLD AUTO: 1 % (ref 0–6)
EOSINOPHIL NFR BLD AUTO: 2 % (ref 0–6)
ERYTHROCYTE [DISTWIDTH] IN BLOOD BY AUTOMATED COUNT: 13.4 % (ref 11.6–15.1)
ERYTHROCYTE [DISTWIDTH] IN BLOOD BY AUTOMATED COUNT: 13.5 % (ref 11.6–15.1)
ERYTHROCYTE [DISTWIDTH] IN BLOOD BY AUTOMATED COUNT: 13.6 % (ref 11.6–15.1)
ERYTHROCYTE [DISTWIDTH] IN BLOOD BY AUTOMATED COUNT: 13.8 % (ref 11.6–15.1)
ERYTHROCYTE [DISTWIDTH] IN BLOOD BY AUTOMATED COUNT: 14.2 % (ref 11.6–15.1)
EST. AVERAGE GLUCOSE BLD GHB EST-MCNC: 131 MG/DL
FLUAV RNA RESP QL NAA+PROBE: NEGATIVE
FLUBV RNA RESP QL NAA+PROBE: NEGATIVE
FRACTIONAL SHORTENING: 38 (ref 28–44)
GFR SERPL CREATININE-BSD FRML MDRD: 20 ML/MIN/1.73SQ M
GFR SERPL CREATININE-BSD FRML MDRD: 25 ML/MIN/1.73SQ M
GFR SERPL CREATININE-BSD FRML MDRD: 45 ML/MIN/1.73SQ M
GFR SERPL CREATININE-BSD FRML MDRD: 45 ML/MIN/1.73SQ M
GFR SERPL CREATININE-BSD FRML MDRD: 47 ML/MIN/1.73SQ M
GFR SERPL CREATININE-BSD FRML MDRD: 52 ML/MIN/1.73SQ M
GFR SERPL CREATININE-BSD FRML MDRD: 61 ML/MIN/1.73SQ M
GFR SERPL CREATININE-BSD FRML MDRD: 62 ML/MIN/1.73SQ M
GFR SERPL CREATININE-BSD FRML MDRD: 65 ML/MIN/1.73SQ M
GFR SERPL CREATININE-BSD FRML MDRD: 65 ML/MIN/1.73SQ M
GLUCOSE P FAST SERPL-MCNC: 134 MG/DL (ref 65–99)
GLUCOSE SERPL-MCNC: 114 MG/DL (ref 65–140)
GLUCOSE SERPL-MCNC: 118 MG/DL (ref 65–140)
GLUCOSE SERPL-MCNC: 122 MG/DL (ref 65–140)
GLUCOSE SERPL-MCNC: 122 MG/DL (ref 65–140)
GLUCOSE SERPL-MCNC: 125 MG/DL (ref 65–140)
GLUCOSE SERPL-MCNC: 126 MG/DL (ref 65–140)
GLUCOSE SERPL-MCNC: 126 MG/DL (ref 65–140)
GLUCOSE SERPL-MCNC: 127 MG/DL (ref 65–140)
GLUCOSE SERPL-MCNC: 127 MG/DL (ref 65–140)
GLUCOSE SERPL-MCNC: 129 MG/DL (ref 65–140)
GLUCOSE SERPL-MCNC: 134 MG/DL (ref 65–140)
GLUCOSE SERPL-MCNC: 136 MG/DL (ref 65–140)
GLUCOSE SERPL-MCNC: 142 MG/DL (ref 65–140)
GLUCOSE SERPL-MCNC: 145 MG/DL (ref 65–140)
GLUCOSE SERPL-MCNC: 148 MG/DL (ref 65–140)
GLUCOSE SERPL-MCNC: 151 MG/DL (ref 65–140)
GLUCOSE SERPL-MCNC: 152 MG/DL (ref 65–140)
GLUCOSE SERPL-MCNC: 165 MG/DL (ref 65–140)
GLUCOSE SERPL-MCNC: 166 MG/DL (ref 65–140)
GLUCOSE SERPL-MCNC: 178 MG/DL (ref 65–140)
GLUCOSE SERPL-MCNC: 179 MG/DL (ref 65–140)
GLUCOSE SERPL-MCNC: 181 MG/DL (ref 65–140)
GLUCOSE SERPL-MCNC: 181 MG/DL (ref 65–140)
GLUCOSE SERPL-MCNC: 183 MG/DL (ref 65–140)
GLUCOSE SERPL-MCNC: 187 MG/DL (ref 65–140)
GLUCOSE SERPL-MCNC: 192 MG/DL (ref 65–140)
GLUCOSE SERPL-MCNC: 200 MG/DL (ref 65–140)
GLUCOSE SERPL-MCNC: 204 MG/DL (ref 65–140)
GLUCOSE SERPL-MCNC: 214 MG/DL (ref 65–140)
GLUCOSE SERPL-MCNC: 225 MG/DL (ref 65–140)
GLUCOSE UR STRIP-MCNC: ABNORMAL MG/DL
GLUCOSE UR STRIP-MCNC: NEGATIVE MG/DL
HBA1C MFR BLD: 6.2 %
HCO3 BLDA-SCNC: 20.2 MMOL/L (ref 24–30)
HCT VFR BLD AUTO: 32.3 % (ref 34.8–46.1)
HCT VFR BLD AUTO: 34.1 % (ref 34.8–46.1)
HCT VFR BLD AUTO: 35.8 % (ref 34.8–46.1)
HCT VFR BLD AUTO: 35.8 % (ref 34.8–46.1)
HCT VFR BLD AUTO: 39.4 % (ref 34.8–46.1)
HCT VFR BLD CALC: 34 % (ref 34.8–46.1)
HDLC SERPL-MCNC: 58 MG/DL
HGB BLD-MCNC: 10.6 G/DL (ref 11.5–15.4)
HGB BLD-MCNC: 11.4 G/DL (ref 11.5–15.4)
HGB BLD-MCNC: 11.4 G/DL (ref 11.5–15.4)
HGB BLD-MCNC: 11.6 G/DL (ref 11.5–15.4)
HGB BLD-MCNC: 12 G/DL (ref 11.5–15.4)
HGB BLDA-MCNC: 11.6 G/DL (ref 11.5–15.4)
HGB UR QL STRIP.AUTO: NEGATIVE
HGB UR QL STRIP.AUTO: NEGATIVE
IMM GRANULOCYTES # BLD AUTO: 0.02 THOUSAND/UL (ref 0–0.2)
IMM GRANULOCYTES # BLD AUTO: 0.05 THOUSAND/UL (ref 0–0.2)
IMM GRANULOCYTES # BLD AUTO: 0.05 THOUSAND/UL (ref 0–0.2)
IMM GRANULOCYTES # BLD AUTO: 0.09 THOUSAND/UL (ref 0–0.2)
IMM GRANULOCYTES # BLD AUTO: 0.14 THOUSAND/UL (ref 0–0.2)
IMM GRANULOCYTES NFR BLD AUTO: 0 % (ref 0–2)
IMM GRANULOCYTES NFR BLD AUTO: 0 % (ref 0–2)
IMM GRANULOCYTES NFR BLD AUTO: 1 % (ref 0–2)
INR PPP: 0.94 (ref 0.84–1.19)
INR PPP: 1.84 (ref 0.84–1.19)
INTERVENTRICULAR SEPTUM IN DIASTOLE (PARASTERNAL SHORT AXIS VIEW): 1.3 CM
INTERVENTRICULAR SEPTUM: 1.3 CM (ref 0.6–1.1)
KETONES UR STRIP-MCNC: ABNORMAL MG/DL
KETONES UR STRIP-MCNC: NEGATIVE MG/DL
LAAS-AP2: 22 CM2
LAAS-AP4: 24.7 CM2
LDLC SERPL CALC-MCNC: 50 MG/DL (ref 0–100)
LEFT ATRIUM AREA SYSTOLE SINGLE PLANE A4C: 24.8 CM2
LEFT ATRIUM SIZE: 3.9 CM
LEFT INTERNAL DIMENSION IN SYSTOLE: 2.8 CM (ref 2.1–4)
LEFT VENTRICULAR INTERNAL DIMENSION IN DIASTOLE: 4.5 CM (ref 3.5–6)
LEFT VENTRICULAR POSTERIOR WALL IN END DIASTOLE: 1.2 CM
LEFT VENTRICULAR STROKE VOLUME: 64 ML
LEUKOCYTE ESTERASE UR QL STRIP: ABNORMAL
LEUKOCYTE ESTERASE UR QL STRIP: ABNORMAL
LVSV (TEICH): 64 ML
LYMPHOCYTES # BLD AUTO: 0.99 THOUSANDS/ΜL (ref 0.6–4.47)
LYMPHOCYTES # BLD AUTO: 1.1 THOUSANDS/ΜL (ref 0.6–4.47)
LYMPHOCYTES # BLD AUTO: 1.2 THOUSANDS/ΜL (ref 0.6–4.47)
LYMPHOCYTES # BLD AUTO: 1.79 THOUSANDS/ΜL (ref 0.6–4.47)
LYMPHOCYTES # BLD AUTO: 2.75 THOUSANDS/ΜL (ref 0.6–4.47)
LYMPHOCYTES NFR BLD AUTO: 10 % (ref 14–44)
LYMPHOCYTES NFR BLD AUTO: 12 % (ref 14–44)
LYMPHOCYTES NFR BLD AUTO: 21 % (ref 14–44)
LYMPHOCYTES NFR BLD AUTO: 21 % (ref 14–44)
LYMPHOCYTES NFR BLD AUTO: 6 % (ref 14–44)
MAGNESIUM SERPL-MCNC: 1.7 MG/DL (ref 1.9–2.7)
MAGNESIUM SERPL-MCNC: 2 MG/DL (ref 1.9–2.7)
MCH RBC QN AUTO: 30.2 PG (ref 26.8–34.3)
MCH RBC QN AUTO: 30.5 PG (ref 26.8–34.3)
MCH RBC QN AUTO: 30.8 PG (ref 26.8–34.3)
MCH RBC QN AUTO: 31 PG (ref 26.8–34.3)
MCH RBC QN AUTO: 31.3 PG (ref 26.8–34.3)
MCHC RBC AUTO-ENTMCNC: 30.5 G/DL (ref 31.4–37.4)
MCHC RBC AUTO-ENTMCNC: 31.8 G/DL (ref 31.4–37.4)
MCHC RBC AUTO-ENTMCNC: 32.4 G/DL (ref 31.4–37.4)
MCHC RBC AUTO-ENTMCNC: 32.8 G/DL (ref 31.4–37.4)
MCHC RBC AUTO-ENTMCNC: 33.4 G/DL (ref 31.4–37.4)
MCV RBC AUTO: 94 FL (ref 82–98)
MCV RBC AUTO: 94 FL (ref 82–98)
MCV RBC AUTO: 96 FL (ref 82–98)
MCV RBC AUTO: 96 FL (ref 82–98)
MCV RBC AUTO: 99 FL (ref 82–98)
MONOCYTES # BLD AUTO: 0.75 THOUSAND/ΜL (ref 0.17–1.22)
MONOCYTES # BLD AUTO: 0.76 THOUSAND/ΜL (ref 0.17–1.22)
MONOCYTES # BLD AUTO: 1.06 THOUSAND/ΜL (ref 0.17–1.22)
MONOCYTES # BLD AUTO: 1.18 THOUSAND/ΜL (ref 0.17–1.22)
MONOCYTES # BLD AUTO: 1.29 THOUSAND/ΜL (ref 0.17–1.22)
MONOCYTES NFR BLD AUTO: 10 % (ref 4–12)
MONOCYTES NFR BLD AUTO: 10 % (ref 4–12)
MONOCYTES NFR BLD AUTO: 6 % (ref 4–12)
MONOCYTES NFR BLD AUTO: 9 % (ref 4–12)
MONOCYTES NFR BLD AUTO: 9 % (ref 4–12)
MV E'TISSUE VEL-LAT: 5 CM/S
MV MEAN GRADIENT: 114 MMHG
MV PEAK A VEL: 1.19 M/S
MV PEAK E VEL: 146 CM/S
MV PEAK GRADIENT: 190 MMHG
MV STENOSIS PRESSURE HALF TIME: 65 MS
MV VALVE AREA BY CONTINUITY EQUATION: 0.24 CM2
MV VALVE AREA P 1/2 METHOD: 3.38
NEUTROPHILS # BLD AUTO: 15.46 THOUSANDS/ΜL (ref 1.85–7.62)
NEUTROPHILS # BLD AUTO: 5.93 THOUSANDS/ΜL (ref 1.85–7.62)
NEUTROPHILS # BLD AUTO: 6.27 THOUSANDS/ΜL (ref 1.85–7.62)
NEUTROPHILS # BLD AUTO: 8.69 THOUSANDS/ΜL (ref 1.85–7.62)
NEUTROPHILS # BLD AUTO: 9.02 THOUSANDS/ΜL (ref 1.85–7.62)
NEUTS SEG NFR BLD AUTO: 66 % (ref 43–75)
NEUTS SEG NFR BLD AUTO: 68 % (ref 43–75)
NEUTS SEG NFR BLD AUTO: 77 % (ref 43–75)
NEUTS SEG NFR BLD AUTO: 79 % (ref 43–75)
NEUTS SEG NFR BLD AUTO: 87 % (ref 43–75)
NITRITE UR QL STRIP: NEGATIVE
NITRITE UR QL STRIP: NEGATIVE
NON-SQ EPI CELLS URNS QL MICRO: ABNORMAL /HPF
NON-SQ EPI CELLS URNS QL MICRO: NORMAL /HPF
NRBC BLD AUTO-RTO: 0 /100 WBCS
NT-PROBNP SERPL-MCNC: 3327 PG/ML
OSMOLALITY UR/SERPL-RTO: 275 MMOL/KG (ref 282–298)
OSMOLALITY UR: 468 MMOL/KG
P AXIS: 139 DEGREES
P AXIS: 35 DEGREES
P AXIS: 60 DEGREES
PA SYSTOLIC PRESSURE: 50 MMHG
PCO2 BLD: 21 MMOL/L (ref 21–32)
PCO2 BLD: 31.4 MM HG (ref 42–50)
PH BLD: 7.42 [PH] (ref 7.3–7.4)
PH UR STRIP.AUTO: 5.5 [PH]
PH UR STRIP.AUTO: 6.5 [PH]
PISA MRMAX VEL: 0.3 M/S
PISA RADIUS: 0.6 CM
PLATELET # BLD AUTO: 265 THOUSANDS/UL (ref 149–390)
PLATELET # BLD AUTO: 284 THOUSANDS/UL (ref 149–390)
PLATELET # BLD AUTO: 307 THOUSANDS/UL (ref 149–390)
PLATELET # BLD AUTO: 318 THOUSANDS/UL (ref 149–390)
PLATELET # BLD AUTO: 326 THOUSANDS/UL (ref 149–390)
PLATELET # BLD AUTO: 354 THOUSANDS/UL (ref 149–390)
PMV BLD AUTO: 10.7 FL (ref 8.9–12.7)
PMV BLD AUTO: 10.8 FL (ref 8.9–12.7)
PMV BLD AUTO: 10.9 FL (ref 8.9–12.7)
PMV BLD AUTO: 11 FL (ref 8.9–12.7)
PMV BLD AUTO: 11.3 FL (ref 8.9–12.7)
PMV BLD AUTO: 12 FL (ref 8.9–12.7)
PO2 BLD: 31 MM HG (ref 35–45)
POTASSIUM BLD-SCNC: 4.5 MMOL/L (ref 3.5–5.3)
POTASSIUM SERPL-SCNC: 3.4 MMOL/L (ref 3.5–5.3)
POTASSIUM SERPL-SCNC: 3.8 MMOL/L (ref 3.5–5.3)
POTASSIUM SERPL-SCNC: 3.9 MMOL/L (ref 3.5–5.3)
POTASSIUM SERPL-SCNC: 4.1 MMOL/L (ref 3.5–5.3)
POTASSIUM SERPL-SCNC: 4.3 MMOL/L (ref 3.5–5.3)
POTASSIUM SERPL-SCNC: 4.5 MMOL/L (ref 3.5–5.3)
POTASSIUM SERPL-SCNC: 4.7 MMOL/L (ref 3.5–5.3)
POTASSIUM SERPL-SCNC: 4.7 MMOL/L (ref 3.5–5.3)
POTASSIUM SERPL-SCNC: 4.9 MMOL/L (ref 3.5–5.3)
POTASSIUM SERPL-SCNC: 5.3 MMOL/L (ref 3.5–5.3)
PR INTERVAL: 0 MS
PR INTERVAL: 148 MS
PR INTERVAL: 154 MS
PROCALCITONIN SERPL-MCNC: <0.05 NG/ML
PROT SERPL-MCNC: 6.8 G/DL (ref 6.4–8.4)
PROT SERPL-MCNC: 7.1 G/DL (ref 6.4–8.4)
PROT UR STRIP-MCNC: ABNORMAL MG/DL
PROT UR STRIP-MCNC: NEGATIVE MG/DL
PROTHROMBIN TIME: 12.6 SECONDS (ref 11.6–14.5)
PROTHROMBIN TIME: 21.5 SECONDS (ref 11.6–14.5)
QRS AXIS: -33 DEGREES
QRS AXIS: -40 DEGREES
QRS AXIS: -86 DEGREES
QRS AXIS: 132 DEGREES
QRS AXIS: 7 DEGREES
QRSD INTERVAL: 124 MS
QRSD INTERVAL: 138 MS
QRSD INTERVAL: 144 MS
QRSD INTERVAL: 146 MS
QRSD INTERVAL: 148 MS
QT INTERVAL: 300 MS
QT INTERVAL: 340 MS
QT INTERVAL: 450 MS
QT INTERVAL: 474 MS
QT INTERVAL: 506 MS
QTC INTERVAL: 448 MS
QTC INTERVAL: 469 MS
QTC INTERVAL: 477 MS
QTC INTERVAL: 496 MS
QTC INTERVAL: 541 MS
RBC # BLD AUTO: 3.44 MILLION/UL (ref 3.81–5.12)
RBC # BLD AUTO: 3.64 MILLION/UL (ref 3.81–5.12)
RBC # BLD AUTO: 3.74 MILLION/UL (ref 3.81–5.12)
RBC # BLD AUTO: 3.74 MILLION/UL (ref 3.81–5.12)
RBC # BLD AUTO: 3.97 MILLION/UL (ref 3.81–5.12)
RBC #/AREA URNS AUTO: ABNORMAL /HPF
RBC #/AREA URNS AUTO: NORMAL /HPF
RH BLD: POSITIVE
RIGHT ATRIUM AREA SYSTOLE A4C: 8.7 CM2
RIGHT VENTRICLE ID DIMENSION: 3.3 CM
RSV RNA RESP QL NAA+PROBE: NEGATIVE
SAO2 % BLD FROM PO2: 61 % (ref 60–85)
SARS-COV-2 RNA RESP QL NAA+PROBE: NEGATIVE
SL CV LEFT ATRIUM LENGTH A2C: 5.2 CM
SL CV LV EF: 55
SL CV PED ECHO LEFT VENTRICLE DIASTOLIC VOLUME (MOD BIPLANE) 2D: 94 ML
SL CV PED ECHO LEFT VENTRICLE SYSTOLIC VOLUME (MOD BIPLANE) 2D: 30 ML
SODIUM 24H UR-SCNC: 48 MOL/L
SODIUM BLD-SCNC: 136 MMOL/L (ref 136–145)
SODIUM SERPL-SCNC: 129 MMOL/L (ref 135–147)
SODIUM SERPL-SCNC: 130 MMOL/L (ref 135–147)
SODIUM SERPL-SCNC: 131 MMOL/L (ref 135–147)
SODIUM SERPL-SCNC: 131 MMOL/L (ref 136–145)
SODIUM SERPL-SCNC: 132 MMOL/L (ref 135–147)
SODIUM SERPL-SCNC: 134 MMOL/L (ref 136–145)
SODIUM SERPL-SCNC: 135 MMOL/L (ref 135–147)
SODIUM SERPL-SCNC: 136 MMOL/L (ref 135–147)
SP GR UR STRIP.AUTO: 1.01 (ref 1–1.03)
SP GR UR STRIP.AUTO: 1.01 (ref 1–1.03)
SPECIMEN EXPIRATION DATE: NORMAL
SPECIMEN SOURCE: ABNORMAL
T WAVE AXIS: 126 DEGREES
T WAVE AXIS: 127 DEGREES
T WAVE AXIS: 133 DEGREES
T WAVE AXIS: 138 DEGREES
T WAVE AXIS: 8 DEGREES
T4 FREE SERPL-MCNC: 1.15 NG/DL (ref 0.76–1.46)
TR MAX PG: 42 MMHG
TR PEAK VELOCITY: 3.2 M/S
TRANS CELLS #/AREA URNS HPF: PRESENT /[HPF]
TRICUSPID VALVE PEAK REGURGITATION VELOCITY: 3.23 M/S
TRIGL SERPL-MCNC: 79 MG/DL
TSH SERPL DL<=0.05 MIU/L-ACNC: 1.63 UIU/ML (ref 0.45–4.5)
TSH SERPL DL<=0.05 MIU/L-ACNC: 1.75 UIU/ML (ref 0.45–4.5)
TSH SERPL DL<=0.05 MIU/L-ACNC: 4.56 UIU/ML (ref 0.45–4.5)
URATE SERPL-MCNC: 7.4 MG/DL (ref 2–7.5)
UROBILINOGEN UR QL STRIP.AUTO: 0.2 E.U./DL
UROBILINOGEN UR STRIP-ACNC: <2 MG/DL
VENTRICULAR RATE: 104 BPM
VENTRICULAR RATE: 147 BPM
VENTRICULAR RATE: 58 BPM
VENTRICULAR RATE: 61 BPM
VENTRICULAR RATE: 87 BPM
VIT B12 SERPL-MCNC: 1639 PG/ML (ref 100–900)
WBC # BLD AUTO: 11.54 THOUSAND/UL (ref 4.31–10.16)
WBC # BLD AUTO: 12.9 THOUSAND/UL (ref 4.31–10.16)
WBC # BLD AUTO: 17.81 THOUSAND/UL (ref 4.31–10.16)
WBC # BLD AUTO: 8.16 THOUSAND/UL (ref 4.31–10.16)
WBC # BLD AUTO: 8.72 THOUSAND/UL (ref 4.31–10.16)
WBC #/AREA URNS AUTO: ABNORMAL /HPF
WBC #/AREA URNS AUTO: NORMAL /HPF

## 2022-01-01 PROCEDURE — 85610 PROTHROMBIN TIME: CPT | Performed by: EMERGENCY MEDICINE

## 2022-01-01 PROCEDURE — 82948 REAGENT STRIP/BLOOD GLUCOSE: CPT

## 2022-01-01 PROCEDURE — 36415 COLL VENOUS BLD VENIPUNCTURE: CPT | Performed by: EMERGENCY MEDICINE

## 2022-01-01 PROCEDURE — 99291 CRITICAL CARE FIRST HOUR: CPT | Performed by: EMERGENCY MEDICINE

## 2022-01-01 PROCEDURE — 96365 THER/PROPH/DIAG IV INF INIT: CPT

## 2022-01-01 PROCEDURE — 84132 ASSAY OF SERUM POTASSIUM: CPT

## 2022-01-01 PROCEDURE — 84295 ASSAY OF SERUM SODIUM: CPT

## 2022-01-01 PROCEDURE — 86850 RBC ANTIBODY SCREEN: CPT | Performed by: SURGERY

## 2022-01-01 PROCEDURE — 84550 ASSAY OF BLOOD/URIC ACID: CPT | Performed by: PHYSICIAN ASSISTANT

## 2022-01-01 PROCEDURE — 85025 COMPLETE CBC W/AUTO DIFF WBC: CPT | Performed by: PHYSICIAN ASSISTANT

## 2022-01-01 PROCEDURE — 84145 PROCALCITONIN (PCT): CPT

## 2022-01-01 PROCEDURE — 71045 X-RAY EXAM CHEST 1 VIEW: CPT

## 2022-01-01 PROCEDURE — 99239 HOSP IP/OBS DSCHRG MGMT >30: CPT | Performed by: INTERNAL MEDICINE

## 2022-01-01 PROCEDURE — 70450 CT HEAD/BRAIN W/O DYE: CPT

## 2022-01-01 PROCEDURE — G0299 HHS/HOSPICE OF RN EA 15 MIN: HCPCS

## 2022-01-01 PROCEDURE — 99285 EMERGENCY DEPT VISIT HI MDM: CPT

## 2022-01-01 PROCEDURE — 10330057 MEDICATION, GENERAL

## 2022-01-01 PROCEDURE — 85014 HEMATOCRIT: CPT

## 2022-01-01 PROCEDURE — G0155 HHCP-SVS OF CSW,EA 15 MIN: HCPCS

## 2022-01-01 PROCEDURE — 36415 COLL VENOUS BLD VENIPUNCTURE: CPT

## 2022-01-01 PROCEDURE — 82330 ASSAY OF CALCIUM: CPT

## 2022-01-01 PROCEDURE — 83880 ASSAY OF NATRIURETIC PEPTIDE: CPT | Performed by: EMERGENCY MEDICINE

## 2022-01-01 PROCEDURE — 80048 BASIC METABOLIC PNL TOTAL CA: CPT

## 2022-01-01 PROCEDURE — 85025 COMPLETE CBC W/AUTO DIFF WBC: CPT | Performed by: EMERGENCY MEDICINE

## 2022-01-01 PROCEDURE — T2042 HOSPICE ROUTINE HOME CARE: HCPCS

## 2022-01-01 PROCEDURE — 80053 COMPREHEN METABOLIC PANEL: CPT | Performed by: EMERGENCY MEDICINE

## 2022-01-01 PROCEDURE — 99232 SBSQ HOSP IP/OBS MODERATE 35: CPT | Performed by: INTERNAL MEDICINE

## 2022-01-01 PROCEDURE — 94761 N-INVAS EAR/PLS OXIMETRY MLT: CPT

## 2022-01-01 PROCEDURE — 99214 OFFICE O/P EST MOD 30 MIN: CPT | Performed by: PHYSICIAN ASSISTANT

## 2022-01-01 PROCEDURE — 84439 ASSAY OF FREE THYROXINE: CPT

## 2022-01-01 PROCEDURE — 99223 1ST HOSP IP/OBS HIGH 75: CPT | Performed by: INTERNAL MEDICINE

## 2022-01-01 PROCEDURE — 76604 US EXAM CHEST: CPT | Performed by: PHYSICIAN ASSISTANT

## 2022-01-01 PROCEDURE — 99222 1ST HOSP IP/OBS MODERATE 55: CPT | Performed by: INTERNAL MEDICINE

## 2022-01-01 PROCEDURE — 83935 ASSAY OF URINE OSMOLALITY: CPT | Performed by: PHYSICIAN ASSISTANT

## 2022-01-01 PROCEDURE — 93010 ELECTROCARDIOGRAM REPORT: CPT | Performed by: INTERNAL MEDICINE

## 2022-01-01 PROCEDURE — 93005 ELECTROCARDIOGRAM TRACING: CPT

## 2022-01-01 PROCEDURE — 83735 ASSAY OF MAGNESIUM: CPT

## 2022-01-01 PROCEDURE — 99285 EMERGENCY DEPT VISIT HI MDM: CPT | Performed by: EMERGENCY MEDICINE

## 2022-01-01 PROCEDURE — 85049 AUTOMATED PLATELET COUNT: CPT | Performed by: HOSPITALIST

## 2022-01-01 PROCEDURE — 76705 ECHO EXAM OF ABDOMEN: CPT | Performed by: PHYSICIAN ASSISTANT

## 2022-01-01 PROCEDURE — 99223 1ST HOSP IP/OBS HIGH 75: CPT | Performed by: HOSPITALIST

## 2022-01-01 PROCEDURE — 94760 N-INVAS EAR/PLS OXIMETRY 1: CPT

## 2022-01-01 PROCEDURE — 80048 BASIC METABOLIC PNL TOTAL CA: CPT | Performed by: NURSE PRACTITIONER

## 2022-01-01 PROCEDURE — 85730 THROMBOPLASTIN TIME PARTIAL: CPT | Performed by: EMERGENCY MEDICINE

## 2022-01-01 PROCEDURE — 99205 OFFICE O/P NEW HI 60 MIN: CPT | Performed by: SURGERY

## 2022-01-01 PROCEDURE — 99284 EMERGENCY DEPT VISIT MOD MDM: CPT | Performed by: INTERNAL MEDICINE

## 2022-01-01 PROCEDURE — 82803 BLOOD GASES ANY COMBINATION: CPT

## 2022-01-01 PROCEDURE — 80053 COMPREHEN METABOLIC PANEL: CPT

## 2022-01-01 PROCEDURE — 93306 TTE W/DOPPLER COMPLETE: CPT

## 2022-01-01 PROCEDURE — 99495 TRANSJ CARE MGMT MOD F2F 14D: CPT | Performed by: FAMILY MEDICINE

## 2022-01-01 PROCEDURE — 83036 HEMOGLOBIN GLYCOSYLATED A1C: CPT

## 2022-01-01 PROCEDURE — 81001 URINALYSIS AUTO W/SCOPE: CPT

## 2022-01-01 PROCEDURE — 80048 BASIC METABOLIC PNL TOTAL CA: CPT | Performed by: PHYSICIAN ASSISTANT

## 2022-01-01 PROCEDURE — 81001 URINALYSIS AUTO W/SCOPE: CPT | Performed by: PHYSICIAN ASSISTANT

## 2022-01-01 PROCEDURE — 96374 THER/PROPH/DIAG INJ IV PUSH: CPT

## 2022-01-01 PROCEDURE — 86900 BLOOD TYPING SEROLOGIC ABO: CPT | Performed by: SURGERY

## 2022-01-01 PROCEDURE — 80162 ASSAY OF DIGOXIN TOTAL: CPT

## 2022-01-01 PROCEDURE — 93000 ELECTROCARDIOGRAM COMPLETE: CPT | Performed by: PHYSICIAN ASSISTANT

## 2022-01-01 PROCEDURE — G1004 CDSM NDSC: HCPCS

## 2022-01-01 PROCEDURE — 82947 ASSAY GLUCOSE BLOOD QUANT: CPT

## 2022-01-01 PROCEDURE — 85025 COMPLETE CBC W/AUTO DIFF WBC: CPT

## 2022-01-01 PROCEDURE — 97163 PT EVAL HIGH COMPLEX 45 MIN: CPT

## 2022-01-01 PROCEDURE — 84484 ASSAY OF TROPONIN QUANT: CPT | Performed by: PHYSICIAN ASSISTANT

## 2022-01-01 PROCEDURE — 84443 ASSAY THYROID STIM HORMONE: CPT | Performed by: EMERGENCY MEDICINE

## 2022-01-01 PROCEDURE — 80048 BASIC METABOLIC PNL TOTAL CA: CPT | Performed by: EMERGENCY MEDICINE

## 2022-01-01 PROCEDURE — 99497 ADVNCD CARE PLAN 30 MIN: CPT | Performed by: PHYSICIAN ASSISTANT

## 2022-01-01 PROCEDURE — 84484 ASSAY OF TROPONIN QUANT: CPT

## 2022-01-01 PROCEDURE — 99244 OFF/OP CNSLTJ NEW/EST MOD 40: CPT | Performed by: INTERNAL MEDICINE

## 2022-01-01 PROCEDURE — 83930 ASSAY OF BLOOD OSMOLALITY: CPT | Performed by: PHYSICIAN ASSISTANT

## 2022-01-01 PROCEDURE — NC001 PR NO CHARGE: Performed by: EMERGENCY MEDICINE

## 2022-01-01 PROCEDURE — 84484 ASSAY OF TROPONIN QUANT: CPT | Performed by: EMERGENCY MEDICINE

## 2022-01-01 PROCEDURE — 84300 ASSAY OF URINE SODIUM: CPT | Performed by: PHYSICIAN ASSISTANT

## 2022-01-01 PROCEDURE — 82306 VITAMIN D 25 HYDROXY: CPT

## 2022-01-01 PROCEDURE — 83880 ASSAY OF NATRIURETIC PEPTIDE: CPT

## 2022-01-01 PROCEDURE — 0241U HB NFCT DS VIR RESP RNA 4 TRGT: CPT

## 2022-01-01 PROCEDURE — 93306 TTE W/DOPPLER COMPLETE: CPT | Performed by: INTERNAL MEDICINE

## 2022-01-01 PROCEDURE — 36415 COLL VENOUS BLD VENIPUNCTURE: CPT | Performed by: SURGERY

## 2022-01-01 PROCEDURE — 10330087 HSPC SERVICE INTENSITY ADD-ON

## 2022-01-01 PROCEDURE — 87040 BLOOD CULTURE FOR BACTERIA: CPT

## 2022-01-01 PROCEDURE — 84443 ASSAY THYROID STIM HORMONE: CPT

## 2022-01-01 PROCEDURE — 1111F DSCHRG MED/CURRENT MED MERGE: CPT | Performed by: FAMILY MEDICINE

## 2022-01-01 PROCEDURE — 99284 EMERGENCY DEPT VISIT MOD MDM: CPT

## 2022-01-01 PROCEDURE — 93308 TTE F-UP OR LMTD: CPT | Performed by: PHYSICIAN ASSISTANT

## 2022-01-01 PROCEDURE — 82607 VITAMIN B-12: CPT

## 2022-01-01 PROCEDURE — 72125 CT NECK SPINE W/O DYE: CPT

## 2022-01-01 PROCEDURE — 86901 BLOOD TYPING SEROLOGIC RH(D): CPT | Performed by: SURGERY

## 2022-01-01 PROCEDURE — 84443 ASSAY THYROID STIM HORMONE: CPT | Performed by: PHYSICIAN ASSISTANT

## 2022-01-01 PROCEDURE — 80061 LIPID PANEL: CPT

## 2022-01-01 PROCEDURE — 83735 ASSAY OF MAGNESIUM: CPT | Performed by: NURSE PRACTITIONER

## 2022-01-01 PROCEDURE — 94002 VENT MGMT INPAT INIT DAY: CPT

## 2022-01-01 PROCEDURE — 0241U HB NFCT DS VIR RESP RNA 4 TRGT: CPT | Performed by: EMERGENCY MEDICINE

## 2022-01-01 RX ORDER — DOCUSATE SODIUM 100 MG/1
100 CAPSULE, LIQUID FILLED ORAL 2 TIMES DAILY
Status: DISCONTINUED | OUTPATIENT
Start: 2022-01-01 | End: 2022-01-01 | Stop reason: HOSPADM

## 2022-01-01 RX ORDER — TRAZODONE HYDROCHLORIDE 50 MG/1
50 TABLET ORAL
Status: DISCONTINUED | OUTPATIENT
Start: 2022-01-01 | End: 2022-01-01 | Stop reason: HOSPADM

## 2022-01-01 RX ORDER — FUROSEMIDE 40 MG/1
40 TABLET ORAL 2 TIMES DAILY
Qty: 180 TABLET | Refills: 1 | Status: SHIPPED | OUTPATIENT
Start: 2022-01-01 | End: 2022-01-01 | Stop reason: SDUPTHER

## 2022-01-01 RX ORDER — POTASSIUM CHLORIDE 20 MEQ/1
20 TABLET, EXTENDED RELEASE ORAL ONCE
Status: DISCONTINUED | OUTPATIENT
Start: 2022-01-01 | End: 2022-01-01

## 2022-01-01 RX ORDER — POTASSIUM CHLORIDE 20 MEQ/1
20 TABLET, EXTENDED RELEASE ORAL DAILY
Status: DISCONTINUED | OUTPATIENT
Start: 2022-01-01 | End: 2022-01-01 | Stop reason: HOSPADM

## 2022-01-01 RX ORDER — SERTRALINE HYDROCHLORIDE 25 MG/1
25 TABLET, FILM COATED ORAL DAILY
Status: DISCONTINUED | OUTPATIENT
Start: 2022-01-01 | End: 2022-01-01 | Stop reason: HOSPADM

## 2022-01-01 RX ORDER — UREA 10 %
500 LOTION (ML) TOPICAL
Status: COMPLETED | OUTPATIENT
Start: 2022-01-01 | End: 2022-01-01

## 2022-01-01 RX ORDER — DIGOXIN 125 MCG
125 TABLET ORAL EVERY OTHER DAY
Status: DISCONTINUED | OUTPATIENT
Start: 2022-01-01 | End: 2022-01-01 | Stop reason: HOSPADM

## 2022-01-01 RX ORDER — DONEPEZIL HYDROCHLORIDE 10 MG/1
TABLET, FILM COATED ORAL
Qty: 90 TABLET | Refills: 1 | Status: SHIPPED | OUTPATIENT
Start: 2022-01-01 | End: 2022-01-01

## 2022-01-01 RX ORDER — ENOXAPARIN SODIUM 100 MG/ML
30 INJECTION SUBCUTANEOUS DAILY
Status: DISCONTINUED | OUTPATIENT
Start: 2022-01-01 | End: 2022-01-01 | Stop reason: HOSPADM

## 2022-01-01 RX ORDER — FUROSEMIDE 40 MG/1
40 TABLET ORAL DAILY
Status: DISCONTINUED | OUTPATIENT
Start: 2022-01-01 | End: 2022-01-01 | Stop reason: HOSPADM

## 2022-01-01 RX ORDER — METHYLPREDNISOLONE SOD SUCC 125 MG
1 VIAL (EA) INJECTION ONCE
Status: COMPLETED | OUTPATIENT
Start: 2022-01-01 | End: 2022-01-01

## 2022-01-01 RX ORDER — FUROSEMIDE 40 MG/1
40 TABLET ORAL 2 TIMES DAILY
Status: DISCONTINUED | OUTPATIENT
Start: 2022-01-01 | End: 2022-01-01

## 2022-01-01 RX ORDER — METOPROLOL TARTRATE 100 MG/1
100 TABLET ORAL EVERY 12 HOURS SCHEDULED
Status: DISCONTINUED | OUTPATIENT
Start: 2022-01-01 | End: 2022-01-01 | Stop reason: HOSPADM

## 2022-01-01 RX ORDER — POTASSIUM CHLORIDE 20 MEQ/1
40 TABLET, EXTENDED RELEASE ORAL ONCE
Status: COMPLETED | OUTPATIENT
Start: 2022-01-01 | End: 2022-01-01

## 2022-01-01 RX ORDER — METOPROLOL TARTRATE 100 MG/1
100 TABLET ORAL EVERY 12 HOURS SCHEDULED
Qty: 180 TABLET | Refills: 1 | Status: SHIPPED | OUTPATIENT
Start: 2022-01-01 | End: 2022-01-01

## 2022-01-01 RX ORDER — DIGOXIN 125 MCG
125 TABLET ORAL 4 TIMES DAILY
Qty: 120 TABLET | Refills: 0 | Status: SHIPPED | OUTPATIENT
Start: 2022-01-01 | End: 2022-01-01 | Stop reason: SDUPTHER

## 2022-01-01 RX ORDER — DILTIAZEM HYDROCHLORIDE 5 MG/ML
10 INJECTION INTRAVENOUS ONCE
Status: COMPLETED | OUTPATIENT
Start: 2022-01-01 | End: 2022-01-01

## 2022-01-01 RX ORDER — METOPROLOL TARTRATE 5 MG/5ML
5 INJECTION INTRAVENOUS EVERY 6 HOURS PRN
Status: DISCONTINUED | OUTPATIENT
Start: 2022-01-01 | End: 2022-01-01 | Stop reason: HOSPADM

## 2022-01-01 RX ORDER — ENOXAPARIN SODIUM 100 MG/ML
40 INJECTION SUBCUTANEOUS DAILY
Status: DISCONTINUED | OUTPATIENT
Start: 2022-01-01 | End: 2022-01-01

## 2022-01-01 RX ORDER — ONDANSETRON 2 MG/ML
4 INJECTION INTRAMUSCULAR; INTRAVENOUS EVERY 6 HOURS PRN
Status: DISCONTINUED | OUTPATIENT
Start: 2022-01-01 | End: 2022-01-01 | Stop reason: HOSPADM

## 2022-01-01 RX ORDER — ONDANSETRON 2 MG/ML
4 INJECTION INTRAMUSCULAR; INTRAVENOUS ONCE
Status: COMPLETED | OUTPATIENT
Start: 2022-01-01 | End: 2022-01-01

## 2022-01-01 RX ORDER — ATORVASTATIN CALCIUM 40 MG/1
40 TABLET, FILM COATED ORAL
Status: DISCONTINUED | OUTPATIENT
Start: 2022-01-01 | End: 2022-01-01 | Stop reason: HOSPADM

## 2022-01-01 RX ORDER — METOPROLOL TARTRATE 50 MG/1
50 TABLET, FILM COATED ORAL EVERY 6 HOURS
Status: DISCONTINUED | OUTPATIENT
Start: 2022-01-01 | End: 2022-01-01

## 2022-01-01 RX ORDER — LISINOPRIL 20 MG/1
20 TABLET ORAL DAILY
Status: DISCONTINUED | OUTPATIENT
Start: 2022-01-01 | End: 2022-01-01 | Stop reason: HOSPADM

## 2022-01-01 RX ORDER — POTASSIUM CHLORIDE 20 MEQ/1
20 TABLET, EXTENDED RELEASE ORAL DAILY
Qty: 5 TABLET | Refills: 0 | Status: SHIPPED | OUTPATIENT
Start: 2022-01-01 | End: 2022-01-01

## 2022-01-01 RX ORDER — METOPROLOL TARTRATE 5 MG/5ML
5 INJECTION INTRAVENOUS ONCE AS NEEDED
Status: COMPLETED | OUTPATIENT
Start: 2022-01-01 | End: 2022-01-01

## 2022-01-01 RX ORDER — MELATONIN
1000 DAILY
Status: DISCONTINUED | OUTPATIENT
Start: 2022-01-01 | End: 2022-01-01 | Stop reason: HOSPADM

## 2022-01-01 RX ORDER — TRAZODONE HYDROCHLORIDE 50 MG/1
50 TABLET ORAL
Qty: 30 TABLET | Refills: 0 | Status: SHIPPED | OUTPATIENT
Start: 2022-01-01 | End: 2022-01-01

## 2022-01-01 RX ORDER — DIGOXIN 125 MCG
125 TABLET ORAL DAILY
Status: DISCONTINUED | OUTPATIENT
Start: 2022-01-01 | End: 2022-01-01

## 2022-01-01 RX ORDER — ALPRAZOLAM 0.25 MG/1
0.25 TABLET ORAL DAILY PRN
Status: DISCONTINUED | OUTPATIENT
Start: 2022-01-01 | End: 2022-01-01 | Stop reason: HOSPADM

## 2022-01-01 RX ORDER — METOPROLOL TARTRATE 100 MG/1
100 TABLET ORAL EVERY 12 HOURS SCHEDULED
Qty: 60 TABLET | Refills: 0 | Status: SHIPPED | OUTPATIENT
Start: 2022-01-01 | End: 2022-01-01

## 2022-01-01 RX ORDER — INSULIN LISPRO 100 [IU]/ML
1-5 INJECTION, SOLUTION INTRAVENOUS; SUBCUTANEOUS
Status: DISCONTINUED | OUTPATIENT
Start: 2022-01-01 | End: 2022-01-01 | Stop reason: HOSPADM

## 2022-01-01 RX ORDER — DIGOXIN 125 MCG
125 TABLET ORAL EVERY OTHER DAY
Qty: 45 TABLET | Refills: 0 | OUTPATIENT
Start: 2022-01-01 | End: 2022-01-01

## 2022-01-01 RX ORDER — FUROSEMIDE 10 MG/ML
40 INJECTION INTRAMUSCULAR; INTRAVENOUS ONCE
Status: COMPLETED | OUTPATIENT
Start: 2022-01-01 | End: 2022-01-01

## 2022-01-01 RX ORDER — MAGNESIUM SULFATE 1 G/100ML
1 INJECTION INTRAVENOUS ONCE
Status: COMPLETED | OUTPATIENT
Start: 2022-01-01 | End: 2022-01-01

## 2022-01-01 RX ORDER — ATORVASTATIN CALCIUM 40 MG/1
TABLET, FILM COATED ORAL
Qty: 90 TABLET | Refills: 3 | Status: SHIPPED | OUTPATIENT
Start: 2022-01-01 | End: 2022-01-01

## 2022-01-01 RX ORDER — FUROSEMIDE 40 MG/1
40 TABLET ORAL DAILY
Status: DISCONTINUED | OUTPATIENT
Start: 2022-01-01 | End: 2022-01-01

## 2022-01-01 RX ORDER — DIGOXIN 0.25 MG/ML
250 INJECTION INTRAMUSCULAR; INTRAVENOUS ONCE
Status: COMPLETED | OUTPATIENT
Start: 2022-01-01 | End: 2022-01-01

## 2022-01-01 RX ORDER — AMLODIPINE BESYLATE 2.5 MG/1
2.5 TABLET ORAL DAILY
Status: DISCONTINUED | OUTPATIENT
Start: 2022-01-01 | End: 2022-01-01 | Stop reason: HOSPADM

## 2022-01-01 RX ORDER — NADOLOL 40 MG/1
40 TABLET ORAL DAILY
Status: DISCONTINUED | OUTPATIENT
Start: 2022-01-01 | End: 2022-01-01

## 2022-01-01 RX ORDER — IPRATROPIUM BROMIDE AND ALBUTEROL SULFATE .5; 3 MG/3ML; MG/3ML
2 SOLUTION RESPIRATORY (INHALATION) ONCE
Status: COMPLETED | OUTPATIENT
Start: 2022-01-01 | End: 2022-01-01

## 2022-01-01 RX ORDER — ONDANSETRON 2 MG/ML
4 INJECTION INTRAMUSCULAR; INTRAVENOUS EVERY 6 HOURS PRN
Status: DISCONTINUED | OUTPATIENT
Start: 2022-01-01 | End: 2022-01-01

## 2022-01-01 RX ORDER — DIGOXIN 125 MCG
125 TABLET ORAL EVERY OTHER DAY
Qty: 15 TABLET | Refills: 0 | Status: SHIPPED | OUTPATIENT
Start: 2022-01-01 | End: 2022-01-01 | Stop reason: SDUPTHER

## 2022-01-01 RX ORDER — SIMETHICONE 20 MG/.3ML
40 EMULSION ORAL EVERY 6 HOURS PRN
Status: DISCONTINUED | OUTPATIENT
Start: 2022-01-01 | End: 2022-01-01 | Stop reason: HOSPADM

## 2022-01-01 RX ORDER — HYDRALAZINE HYDROCHLORIDE 20 MG/ML
5 INJECTION INTRAMUSCULAR; INTRAVENOUS EVERY 6 HOURS PRN
Status: DISCONTINUED | OUTPATIENT
Start: 2022-01-01 | End: 2022-01-01 | Stop reason: HOSPADM

## 2022-01-01 RX ORDER — LEVOTHYROXINE SODIUM 0.03 MG/1
25 TABLET ORAL
Status: DISCONTINUED | OUTPATIENT
Start: 2022-01-01 | End: 2022-01-01 | Stop reason: HOSPADM

## 2022-01-01 RX ORDER — FUROSEMIDE 40 MG/1
40 TABLET ORAL DAILY
Qty: 90 TABLET | Refills: 1 | Status: SHIPPED | OUTPATIENT
Start: 2022-01-01 | End: 2022-01-01

## 2022-01-01 RX ORDER — ACETAMINOPHEN 325 MG/1
650 TABLET ORAL EVERY 4 HOURS PRN
Status: DISCONTINUED | OUTPATIENT
Start: 2022-01-01 | End: 2022-01-01 | Stop reason: HOSPADM

## 2022-01-01 RX ORDER — FUROSEMIDE 40 MG/1
40 TABLET ORAL DAILY
Qty: 30 TABLET | Refills: 0 | Status: SHIPPED | OUTPATIENT
Start: 2022-01-01 | End: 2022-01-01 | Stop reason: SDUPTHER

## 2022-01-01 RX ORDER — DIGOXIN 125 MCG
125 TABLET ORAL EVERY OTHER DAY
Qty: 15 TABLET | Refills: 0 | Status: SHIPPED | OUTPATIENT
Start: 2022-01-01 | End: 2022-01-01

## 2022-01-01 RX ORDER — LEVOTHYROXINE SODIUM 0.03 MG/1
TABLET ORAL
Qty: 180 TABLET | Refills: 1 | Status: SHIPPED | OUTPATIENT
Start: 2022-01-01

## 2022-01-01 RX ORDER — LEVOTHYROXINE SODIUM 0.05 MG/1
50 TABLET ORAL
Status: DISCONTINUED | OUTPATIENT
Start: 2022-01-01 | End: 2022-01-01 | Stop reason: HOSPADM

## 2022-01-01 RX ORDER — DONEPEZIL HYDROCHLORIDE 5 MG/1
10 TABLET, FILM COATED ORAL
Status: DISCONTINUED | OUTPATIENT
Start: 2022-01-01 | End: 2022-01-01

## 2022-01-01 RX ORDER — DIGOXIN 0.25 MG/ML
125 INJECTION INTRAMUSCULAR; INTRAVENOUS EVERY 6 HOURS
Status: COMPLETED | OUTPATIENT
Start: 2022-01-01 | End: 2022-01-01

## 2022-01-01 RX ORDER — NITROGLYCERIN 0.4 MG/1
1 TABLET SUBLINGUAL ONCE
Status: COMPLETED | OUTPATIENT
Start: 2022-01-01 | End: 2022-01-01

## 2022-01-01 RX ORDER — FUROSEMIDE 40 MG/1
40 TABLET ORAL DAILY PRN
Qty: 180 TABLET | Refills: 0 | Status: SHIPPED | OUTPATIENT
Start: 2022-01-01 | End: 2022-01-01

## 2022-01-01 RX ORDER — GLIMEPIRIDE 1 MG/1
1 TABLET ORAL
Qty: 30 TABLET | Refills: 5 | OUTPATIENT
Start: 2022-01-01 | End: 2022-01-01

## 2022-01-01 RX ORDER — ATORVASTATIN CALCIUM 40 MG/1
40 TABLET, FILM COATED ORAL DAILY
Status: DISCONTINUED | OUTPATIENT
Start: 2022-01-01 | End: 2022-01-01 | Stop reason: HOSPADM

## 2022-01-01 RX ORDER — FUROSEMIDE 10 MG/ML
40 INJECTION INTRAMUSCULAR; INTRAVENOUS DAILY
Status: DISCONTINUED | OUTPATIENT
Start: 2022-01-01 | End: 2022-01-01

## 2022-01-01 RX ADMIN — METOPROLOL TARTRATE 50 MG: 50 TABLET, FILM COATED ORAL at 02:56

## 2022-01-01 RX ADMIN — ATORVASTATIN CALCIUM 40 MG: 40 TABLET, FILM COATED ORAL at 13:27

## 2022-01-01 RX ADMIN — DILTIAZEM HYDROCHLORIDE 10 MG/HR: 5 INJECTION, SOLUTION INTRAVENOUS at 11:42

## 2022-01-01 RX ADMIN — Medication 1000 UNITS: at 08:51

## 2022-01-01 RX ADMIN — FUROSEMIDE 40 MG: 40 TABLET ORAL at 08:32

## 2022-01-01 RX ADMIN — METOPROLOL TARTRATE 25 MG: 25 TABLET, FILM COATED ORAL at 09:34

## 2022-01-01 RX ADMIN — POTASSIUM CHLORIDE 20 MEQ: 1500 TABLET, EXTENDED RELEASE ORAL at 08:14

## 2022-01-01 RX ADMIN — METOPROLOL TARTRATE 50 MG: 50 TABLET, FILM COATED ORAL at 21:18

## 2022-01-01 RX ADMIN — NADOLOL 40 MG: 40 TABLET ORAL at 13:27

## 2022-01-01 RX ADMIN — METOPROLOL TARTRATE 50 MG: 50 TABLET, FILM COATED ORAL at 14:15

## 2022-01-01 RX ADMIN — DONEPEZIL HYDROCHLORIDE 10 MG: 5 TABLET ORAL at 21:35

## 2022-01-01 RX ADMIN — METOPROLOL TARTRATE 50 MG: 50 TABLET, FILM COATED ORAL at 08:32

## 2022-01-01 RX ADMIN — APIXABAN 2.5 MG: 2.5 TABLET, FILM COATED ORAL at 17:35

## 2022-01-01 RX ADMIN — METOROPROLOL TARTRATE 5 MG: 5 INJECTION, SOLUTION INTRAVENOUS at 07:29

## 2022-01-01 RX ADMIN — LEVOTHYROXINE SODIUM 25 MCG: 25 TABLET ORAL at 05:54

## 2022-01-01 RX ADMIN — LISINOPRIL 20 MG: 20 TABLET ORAL at 08:14

## 2022-01-01 RX ADMIN — ATORVASTATIN CALCIUM 40 MG: 40 TABLET, FILM COATED ORAL at 08:14

## 2022-01-01 RX ADMIN — FUROSEMIDE 40 MG: 40 TABLET ORAL at 09:24

## 2022-01-01 RX ADMIN — DONEPEZIL HYDROCHLORIDE 10 MG: 5 TABLET ORAL at 21:18

## 2022-01-01 RX ADMIN — APIXABAN 2.5 MG: 2.5 TABLET, FILM COATED ORAL at 17:13

## 2022-01-01 RX ADMIN — FUROSEMIDE 40 MG: 10 INJECTION, SOLUTION INTRAMUSCULAR; INTRAVENOUS at 09:57

## 2022-01-01 RX ADMIN — Medication 1000 UNITS: at 15:00

## 2022-01-01 RX ADMIN — ATORVASTATIN CALCIUM 40 MG: 40 TABLET, FILM COATED ORAL at 09:25

## 2022-01-01 RX ADMIN — APIXABAN 2.5 MG: 2.5 TABLET, FILM COATED ORAL at 18:00

## 2022-01-01 RX ADMIN — AMLODIPINE BESYLATE 2.5 MG: 2.5 TABLET ORAL at 08:15

## 2022-01-01 RX ADMIN — ONDANSETRON 4 MG: 2 INJECTION INTRAMUSCULAR; INTRAVENOUS at 23:58

## 2022-01-01 RX ADMIN — TRIMETHOBENZAMIDE HYDROCHLORIDE 200 MG: 100 INJECTION INTRAMUSCULAR at 00:37

## 2022-01-01 RX ADMIN — Medication 1000 UNITS: at 08:32

## 2022-01-01 RX ADMIN — LEVOTHYROXINE SODIUM 50 MCG: 50 TABLET ORAL at 05:39

## 2022-01-01 RX ADMIN — ENOXAPARIN SODIUM 30 MG: 30 INJECTION SUBCUTANEOUS at 08:14

## 2022-01-01 RX ADMIN — INSULIN LISPRO 1 UNITS: 100 INJECTION, SOLUTION INTRAVENOUS; SUBCUTANEOUS at 12:58

## 2022-01-01 RX ADMIN — METOPROLOL TARTRATE 25 MG: 25 TABLET, FILM COATED ORAL at 21:35

## 2022-01-01 RX ADMIN — FUROSEMIDE 40 MG: 40 TABLET ORAL at 08:11

## 2022-01-01 RX ADMIN — DOCUSATE SODIUM 100 MG: 100 CAPSULE, LIQUID FILLED ORAL at 17:24

## 2022-01-01 RX ADMIN — INSULIN LISPRO 1 UNITS: 100 INJECTION, SOLUTION INTRAVENOUS; SUBCUTANEOUS at 17:24

## 2022-01-01 RX ADMIN — ATORVASTATIN CALCIUM 40 MG: 40 TABLET, FILM COATED ORAL at 17:13

## 2022-01-01 RX ADMIN — DILTIAZEM HYDROCHLORIDE 10 MG: 5 INJECTION, SOLUTION INTRAVENOUS at 11:41

## 2022-01-01 RX ADMIN — DILTIAZEM HYDROCHLORIDE 15 MG/HR: 5 INJECTION, SOLUTION INTRAVENOUS at 15:11

## 2022-01-01 RX ADMIN — DIGOXIN 125 MCG: 125 TABLET ORAL at 08:51

## 2022-01-01 RX ADMIN — LISINOPRIL 20 MG: 20 TABLET ORAL at 13:27

## 2022-01-01 RX ADMIN — INSULIN LISPRO 1 UNITS: 100 INJECTION, SOLUTION INTRAVENOUS; SUBCUTANEOUS at 11:59

## 2022-01-01 RX ADMIN — DOCUSATE SODIUM 100 MG: 100 CAPSULE, LIQUID FILLED ORAL at 08:14

## 2022-01-01 RX ADMIN — CYANOCOBALAMIN TAB 500 MCG 1000 MCG: 500 TAB at 13:02

## 2022-01-01 RX ADMIN — DOCUSATE SODIUM 100 MG: 100 CAPSULE, LIQUID FILLED ORAL at 09:24

## 2022-01-01 RX ADMIN — MAGNESIUM SULFATE HEPTAHYDRATE 1 G: 1 INJECTION, SOLUTION INTRAVENOUS at 08:33

## 2022-01-01 RX ADMIN — METOPROLOL TARTRATE 50 MG: 50 TABLET, FILM COATED ORAL at 20:39

## 2022-01-01 RX ADMIN — DIGOXIN 125 MCG: 0.25 INJECTION INTRAMUSCULAR; INTRAVENOUS at 21:18

## 2022-01-01 RX ADMIN — LISINOPRIL 20 MG: 20 TABLET ORAL at 09:24

## 2022-01-01 RX ADMIN — APIXABAN 2.5 MG: 2.5 TABLET, FILM COATED ORAL at 08:51

## 2022-01-01 RX ADMIN — LEVOTHYROXINE SODIUM 25 MCG: 25 TABLET ORAL at 13:26

## 2022-01-01 RX ADMIN — CYANOCOBALAMIN TAB 500 MCG 1000 MCG: 500 TAB at 09:24

## 2022-01-01 RX ADMIN — SERTRALINE 25 MG: 25 TABLET, FILM COATED ORAL at 13:27

## 2022-01-01 RX ADMIN — INSULIN LISPRO 1 UNITS: 100 INJECTION, SOLUTION INTRAVENOUS; SUBCUTANEOUS at 16:53

## 2022-01-01 RX ADMIN — ATORVASTATIN CALCIUM 40 MG: 40 TABLET, FILM COATED ORAL at 18:00

## 2022-01-01 RX ADMIN — FUROSEMIDE 40 MG: 40 TABLET ORAL at 08:50

## 2022-01-01 RX ADMIN — INSULIN LISPRO 2 UNITS: 100 INJECTION, SOLUTION INTRAVENOUS; SUBCUTANEOUS at 11:23

## 2022-01-01 RX ADMIN — CYANOCOBALAMIN TAB 500 MCG 1000 MCG: 500 TAB at 08:14

## 2022-01-01 RX ADMIN — AMLODIPINE BESYLATE 2.5 MG: 2.5 TABLET ORAL at 09:24

## 2022-01-01 RX ADMIN — FUROSEMIDE 40 MG: 10 INJECTION, SOLUTION INTRAMUSCULAR; INTRAVENOUS at 08:14

## 2022-01-01 RX ADMIN — DOCUSATE SODIUM 100 MG: 100 CAPSULE, LIQUID FILLED ORAL at 17:00

## 2022-01-01 RX ADMIN — APIXABAN 2.5 MG: 2.5 TABLET, FILM COATED ORAL at 08:11

## 2022-01-01 RX ADMIN — POTASSIUM CHLORIDE 20 MEQ: 1500 TABLET, EXTENDED RELEASE ORAL at 13:27

## 2022-01-01 RX ADMIN — ATORVASTATIN CALCIUM 40 MG: 40 TABLET, FILM COATED ORAL at 17:35

## 2022-01-01 RX ADMIN — ENOXAPARIN SODIUM 30 MG: 30 INJECTION SUBCUTANEOUS at 13:02

## 2022-01-01 RX ADMIN — INSULIN LISPRO 1 UNITS: 100 INJECTION, SOLUTION INTRAVENOUS; SUBCUTANEOUS at 22:07

## 2022-01-01 RX ADMIN — HYDRALAZINE HYDROCHLORIDE 5 MG: 20 INJECTION, SOLUTION INTRAMUSCULAR; INTRAVENOUS at 21:35

## 2022-01-01 RX ADMIN — DIGOXIN 250 MCG: 0.25 INJECTION INTRAMUSCULAR; INTRAVENOUS at 15:17

## 2022-01-01 RX ADMIN — METOPROLOL TARTRATE 50 MG: 50 TABLET, FILM COATED ORAL at 14:41

## 2022-01-01 RX ADMIN — INSULIN LISPRO 1 UNITS: 100 INJECTION, SOLUTION INTRAVENOUS; SUBCUTANEOUS at 18:01

## 2022-01-01 RX ADMIN — INSULIN LISPRO 1 UNITS: 100 INJECTION, SOLUTION INTRAVENOUS; SUBCUTANEOUS at 09:35

## 2022-01-01 RX ADMIN — DOCUSATE SODIUM 100 MG: 100 CAPSULE, LIQUID FILLED ORAL at 13:27

## 2022-01-01 RX ADMIN — ENOXAPARIN SODIUM 30 MG: 30 INJECTION SUBCUTANEOUS at 09:25

## 2022-01-01 RX ADMIN — FUROSEMIDE 40 MG: 10 INJECTION, SOLUTION INTRAMUSCULAR; INTRAVENOUS at 17:00

## 2022-01-01 RX ADMIN — APIXABAN 2.5 MG: 2.5 TABLET, FILM COATED ORAL at 08:32

## 2022-01-01 RX ADMIN — Medication 500 MG: at 10:02

## 2022-01-01 RX ADMIN — AMLODIPINE BESYLATE 2.5 MG: 2.5 TABLET ORAL at 13:28

## 2022-01-01 RX ADMIN — Medication 1000 UNITS: at 08:11

## 2022-01-01 RX ADMIN — LEVOTHYROXINE SODIUM 25 MCG: 25 TABLET ORAL at 05:46

## 2022-01-01 RX ADMIN — POTASSIUM CHLORIDE 20 MEQ: 1500 TABLET, EXTENDED RELEASE ORAL at 09:24

## 2022-01-01 RX ADMIN — METOPROLOL TARTRATE 100 MG: 100 TABLET, FILM COATED ORAL at 08:51

## 2022-01-01 RX ADMIN — SODIUM CHLORIDE 500 ML: 0.9 INJECTION, SOLUTION INTRAVENOUS at 11:31

## 2022-01-01 RX ADMIN — POTASSIUM CHLORIDE 40 MEQ: 1500 TABLET, EXTENDED RELEASE ORAL at 09:23

## 2022-01-01 RX ADMIN — DIGOXIN 125 MCG: 0.25 INJECTION INTRAMUSCULAR; INTRAVENOUS at 02:54

## 2022-01-01 RX ADMIN — LEVOTHYROXINE SODIUM 50 MCG: 50 TABLET ORAL at 08:15

## 2022-01-01 RX ADMIN — SERTRALINE 25 MG: 25 TABLET, FILM COATED ORAL at 09:25

## 2022-01-01 RX ADMIN — SERTRALINE 25 MG: 25 TABLET, FILM COATED ORAL at 08:14

## 2022-01-03 PROBLEM — F03.90 DEMENTIA WITHOUT BEHAVIORAL DISTURBANCE, UNSPECIFIED DEMENTIA TYPE (HCC): Status: ACTIVE | Noted: 2022-01-01

## 2022-01-28 DIAGNOSIS — F03.90 DEMENTIA WITHOUT BEHAVIORAL DISTURBANCE, UNSPECIFIED DEMENTIA TYPE (HCC): Primary | ICD-10-CM

## 2022-01-28 RX ORDER — DONEPEZIL HYDROCHLORIDE 10 MG/1
10 TABLET, FILM COATED ORAL
Qty: 90 TABLET | Refills: 1 | Status: SHIPPED | OUTPATIENT
Start: 2022-01-28 | End: 2022-07-12

## 2022-05-03 ENCOUNTER — OFFICE VISIT (OUTPATIENT)
Dept: FAMILY MEDICINE CLINIC | Facility: CLINIC | Age: 87
End: 2022-05-03
Payer: COMMERCIAL

## 2022-05-03 VITALS
DIASTOLIC BLOOD PRESSURE: 80 MMHG | WEIGHT: 115 LBS | OXYGEN SATURATION: 98 % | RESPIRATION RATE: 16 BRPM | SYSTOLIC BLOOD PRESSURE: 134 MMHG | BODY MASS INDEX: 22.58 KG/M2 | HEIGHT: 60 IN | HEART RATE: 74 BPM

## 2022-05-03 DIAGNOSIS — F03.90 DEMENTIA WITHOUT BEHAVIORAL DISTURBANCE, UNSPECIFIED DEMENTIA TYPE (HCC): ICD-10-CM

## 2022-05-03 DIAGNOSIS — E55.9 VITAMIN D DEFICIENCY: ICD-10-CM

## 2022-05-03 DIAGNOSIS — F41.9 ANXIETY: Primary | ICD-10-CM

## 2022-05-03 DIAGNOSIS — E11.9 TYPE 2 DIABETES MELLITUS WITHOUT COMPLICATION, WITHOUT LONG-TERM CURRENT USE OF INSULIN (HCC): ICD-10-CM

## 2022-05-03 DIAGNOSIS — E53.8 B12 DEFICIENCY: ICD-10-CM

## 2022-05-03 DIAGNOSIS — I10 PRIMARY HYPERTENSION: ICD-10-CM

## 2022-05-03 DIAGNOSIS — E03.9 HYPOTHYROIDISM, UNSPECIFIED TYPE: ICD-10-CM

## 2022-05-03 DIAGNOSIS — I27.20 PULMONARY HYPERTENSION (HCC): ICD-10-CM

## 2022-05-03 DIAGNOSIS — I10 ESSENTIAL HYPERTENSION: ICD-10-CM

## 2022-05-03 DIAGNOSIS — I35.0 SEVERE AORTIC STENOSIS: ICD-10-CM

## 2022-05-03 LAB — SL AMB POCT HEMOGLOBIN AIC: 6.3 (ref ?–6.5)

## 2022-05-03 PROCEDURE — 1170F FXNL STATUS ASSESSED: CPT | Performed by: FAMILY MEDICINE

## 2022-05-03 PROCEDURE — 1125F AMNT PAIN NOTED PAIN PRSNT: CPT | Performed by: FAMILY MEDICINE

## 2022-05-03 PROCEDURE — 83036 HEMOGLOBIN GLYCOSYLATED A1C: CPT | Performed by: FAMILY MEDICINE

## 2022-05-03 PROCEDURE — 1036F TOBACCO NON-USER: CPT | Performed by: FAMILY MEDICINE

## 2022-05-03 PROCEDURE — 3288F FALL RISK ASSESSMENT DOCD: CPT | Performed by: FAMILY MEDICINE

## 2022-05-03 PROCEDURE — G0439 PPPS, SUBSEQ VISIT: HCPCS | Performed by: FAMILY MEDICINE

## 2022-05-03 PROCEDURE — 1160F RVW MEDS BY RX/DR IN RCRD: CPT | Performed by: FAMILY MEDICINE

## 2022-05-03 PROCEDURE — 3725F SCREEN DEPRESSION PERFORMED: CPT | Performed by: FAMILY MEDICINE

## 2022-05-03 PROCEDURE — 99214 OFFICE O/P EST MOD 30 MIN: CPT | Performed by: FAMILY MEDICINE

## 2022-05-03 RX ORDER — VENLAFAXINE HYDROCHLORIDE 37.5 MG/1
37.5 CAPSULE, EXTENDED RELEASE ORAL
Qty: 30 CAPSULE | Refills: 0 | Status: SHIPPED | OUTPATIENT
Start: 2022-05-03 | End: 2022-05-13 | Stop reason: ALTCHOICE

## 2022-05-03 RX ORDER — BLOOD SUGAR DIAGNOSTIC
STRIP MISCELLANEOUS
Qty: 100 STRIP | Refills: 11 | Status: SHIPPED | OUTPATIENT
Start: 2022-05-03

## 2022-05-03 RX ORDER — BLOOD-GLUCOSE METER
KIT MISCELLANEOUS
Qty: 1 KIT | Refills: 0 | Status: SHIPPED | OUTPATIENT
Start: 2022-05-03

## 2022-05-03 NOTE — PROGRESS NOTES
Assessment and Plan:     Problem List Items Addressed This Visit        Endocrine    Diabetes ([facility] )    Relevant Medications    Blood Glucose Monitoring Suppl (OneTouch Verio Reflect) w/Device KIT    glucose blood (OneTouch Verio) test strip    Other Relevant Orders    Microalbumin / creatinine urine ratio    Lipid Panel with Direct LDL reflex    POCT hemoglobin A1c (Completed)    Other specified hypothyroidism       Cardiovascular and Mediastinum    Hypertension     -stable/controlled, continue same medication  Will evaluate again next visit   A         Severe aortic stenosis     Echo done last year - never saw cardio   Sent to [facility] but never made the appt either   Mild STEVENSON          Relevant Orders    Echo complete w/ contrast if indicated    Pulmonary hypertension ([place] [address] )     -stable/controlled, continue same medication  Will evaluate again next visit   Echo this year             Nervous and Auditory    Dementia without behavioral disturbance, unspecified dementia type ([place] [address] )     This is worsening   We can add in effexor instead of the SSRI            Relevant Medications    venlafaxine (EFFEXOR-XR) 37 5 mg 24 hr capsule       Other    Anxiety - Primary    Relevant Medications    venlafaxine (EFFEXOR-XR) 37 5 mg 24 hr capsule      Other Visit Diagnoses     Essential hypertension        Relevant Orders    NT-BNP PRO    Vitamin D deficiency        Relevant Orders    Vitamin D 25 hydroxy    B12 deficiency        Relevant Orders    Vitamin B12           Preventive health issues were discussed with patient, and age appropriate screening tests were ordered as noted in patient's After Visit Summary  Personalized health advice and appropriate referrals for health education or preventive services given if needed, as noted in patient's After Visit Summary       History of Present Illness:     Patient presents for Medicare Annual Wellness visit    Patient Care Team:  Placido Butler MD as PCP - Demetria Rojas MD (Cardiology)  BLANCA Knapp (Vascular Surgery)     Problem List:     Patient Active Problem List   Diagnosis    Hyperlipidemia    Hypertension    Asymptomatic bilateral carotid artery stenosis    Diabetes (HCC)    Other specified hypothyroidism    Coronary artery disease    HL (hearing loss)    Anxiety    Slow transit constipation    Age-related osteoporosis without current pathological fracture    Severe aortic stenosis    Pulmonary hypertension (HCC)    Dementia without behavioral disturbance, unspecified dementia type ([facility] )      Past Medical and Surgical History:     Past Medical History:   Diagnosis Date    Anxiety     Coronary artery disease     as per stefani    Diabetes ([facility] )     as per stefani    Disease of thyroid gland     Heart attack ([facility] )     as per stefani    High cholesterol     as per [place]    HL (hearing loss)     Wears hearing aid, right    Hyperlipidemia     Hyperlipidemia     as per [place]    Hypertension     as per [place]     Past Surgical History:   Procedure Laterality Date    ADENOIDECTOMY      APPENDECTOMY      as per [place]    EYE SURGERY Bilateral 2015    as per [place]    STAPEDECTOMY      Metal STAPES LEFT ear (can not have an MRI)- as per [place]    TONSILLECTOMY      as per [place]      Family History:     Family History   Problem Relation Age of Onset    Parkinsonism Mother     Heart attack Father     Coronary artery disease Sister         arteriosclerosis        Social History:     Social History     Socioeconomic History    Marital status:       Spouse name: None    Number of children: None    Years of education: None    Highest education level: None   Occupational History    None   Tobacco Use    Smoking status: Never Smoker    Smokeless tobacco: Never Used   Vaping Use    Vaping Use: Never used   Substance and Sexual Activity    Alcohol use: Yes     Comment: occasional     Drug use: Never    Sexual activity: None   Other Topics Concern    None   Social History Narrative    · Do you currently or have you served in the [employer]:   No      · Were you activated, into active duty, as a member of the [employer] or as a Reservist:   No      · Live alone or with others:   alone      · Exercise level:   Occasional      · Overweight:   Yes      · Obese:   No      · General stress level:   Medium      · Diet:   Regular      · Advance directive: Yes      · Caffeine intake: Moderate      · Guns present in home: Yes      · Seat belts used routinely:   Yes      · Sexual orientation:   Heterosexual      · Sunscreen used routinely:   No      · Seat belt/car seat used routinely:   Yes      · Do you have regular medical check ups: Yes      · Do you have regular dental check ups: Yes      · Education:   12      · Smoke alarm in home:    Yes      · Salt Intake:   regular      ·      Social Determinants of Health     Financial Resource Strain: Not on file   Food Insecurity: Not on file   Transportation Needs: Not on file   Physical Activity: Not on file   Stress: Not on file   Social Connections: Not on file   Intimate Partner Violence: Not on file   Housing Stability: Not on file      Medications and Allergies:     Current Outpatient Medications   Medication Sig Dispense Refill    ALPRAZolam (XANAX) 0 25 mg tablet TAKE ONE TABLET BY MOUTH EVERY DAY AS NEEDED 30 tablet 5    amLODIPine (NORVASC) 2 5 mg tablet TAKE ONE TABLET BY MOUTH EVERY MORNING AND TAKE ONE TABLET BY MOUTH EVERY EVENING 180 tablet 0    atorvastatin (LIPITOR) 40 mg tablet TAKE ONE TABLET BY MOUTH EVERY DAY 30 tablet 0    betamethasone valerate (VALISONE) 0 1 % cream Apply topically 2 (two) times a day 30 g 1    cholecalciferol (VITAMIN D3) 1,000 units tablet Take 1,000 Units by mouth daily      donepezil (Aricept) 10 mg tablet Take 1 tablet (10 mg total) by mouth daily at bedtime 90 tablet 1    fluocinolone acetonide (DERMOTIC) 0 01 % otic oil Administer 5 drops into both ears 2 (two) times a day as needed (itching) 20 mL 3    Lancets (freestyle) lancets TEST BLOOD SUGAR ONCE IN THE MORNING 100 each 11    levothyroxine 25 mcg tablet TAKE 2 TABLETS ON MONDAY, WEDNESDAY, AND FRIDAY  TAKE 1 TABLET ON ALL OTHER DAYS 40 tablet 1    lisinopril (ZESTRIL) 20 mg tablet TAKE ONE TABLET BY MOUTH EVERY DAY 90 tablet 0    metFORMIN (GLUCOPHAGE-XR) 500 mg 24 hr tablet TAKE TWO TABLETS BY MOUTH TWICE A DAY WITH MEALS 360 tablet 0    nadolol (CORGARD) 40 mg tablet TAKE ONE TABLET BY MOUTH EVERY DAY 90 tablet 0    vitamin B-12 (VITAMIN B-12) 1,000 mcg tablet Take by mouth daily      Blood Glucose Monitoring Suppl (OneTouch Verio Reflect) w/Device KIT uad once daily 1 kit 0    glucose blood (OneTouch Verio) test strip Use as instructed once daily 100 strip 11    neomycin-polymyxin-hydrocortisone (CORTISPORIN) 0 35%-10,000 units/mL-1% otic suspension Administer 4 drops to the right ear 2 (two) times a day for 10 days 10 mL 0    venlafaxine (EFFEXOR-XR) 37 5 mg 24 hr capsule Take 1 capsule (37 5 mg total) by mouth daily with breakfast 30 capsule 0     No current facility-administered medications for this visit  No Known Allergies   Immunizations:     Immunization History   Administered Date(s) Administered    COVID-19 MODERNA VACC 0 25 ML IM BOOSTER 10/27/2021    COVID-19 MODERNA VACC 0 5 ML IM 01/22/2021, 02/19/2021    INFLUENZA 10/14/2013, 10/12/2015, 10/19/2017, 11/01/2018, 11/02/2020    Influenza Split High Dose Preservative Free IM 10/23/2018    Influenza, recombinant, quadrivalent,injectable, preservative free 10/24/2019    Pneumococcal Conjugate 13-Valent 10/24/2019    Pneumococcal Polysaccharide PPV23 06/01/2013    Tdap 04/22/2018, 04/28/2018      Health Maintenance: There are no preventive care reminders to display for this patient  There are no preventive care reminders to display for this patient     Medicare Health Risk Assessment:     /80 (BP Location: Left arm, Patient Position: Sitting, Cuff Size: Standard)   Pulse 74   Resp 16   Ht 5' (1 524 m)   Wt 52 2 kg (115 lb)   SpO2 98%   BMI 22 46 kg/m²      Ar Braun is here for her Subsequent Wellness visit  Last Medicare Wellness visit information reviewed, patient interviewed and updates made to the record today  Health Risk Assessment:   Patient rates overall health as good  Patient feels that their physical health rating is same  Patient is satisfied with their life  Eyesight was rated as same  Hearing was rated as same  Patient feels that their emotional and mental health rating is same  Patients states they are never, rarely angry  Patient states they are sometimes unusually tired/fatigued  Pain experienced in the last 7 days has been none  Patient states that she has experienced no weight loss or gain in last 6 months  Depression Screening:   PHQ-2 Score: 0      Fall Risk Screening: In the past year, patient has experienced: no history of falling in past year      Urinary Incontinence Screening:   Patient has leaked urine accidently in the last six months  not often    Home Safety:  Patient does not have trouble with stairs inside or outside of their home  Patient has working smoke alarms and has working carbon monoxide detector  Home safety hazards include: none  Nutrition:   Current diet is Regular  Medications:   Patient is not currently taking any over-the-counter supplements  Patient is able to manage medications  Activities of Daily Living (ADLs)/Instrumental Activities of Daily Living (IADLs):   Walk and transfer into and out of bed and chair?: Yes  Dress and groom yourself?: Yes    Bathe or shower yourself?: Yes    Feed yourself?  Yes  Do your laundry/housekeeping?: Yes  Manage your money, pay your bills and track your expenses?: No  Make your own meals?: Yes    Do your own shopping?: Yes    Previous Hospitalizations:   Any hospitalizations or ED visits within the last 12 months?: No      Advance Care Planning:   Living will: Yes    Durable POA for healthcare: Yes    Advanced directive: Yes      Cognitive Screening:   Provider or family/friend/caregiver concerned regarding cognition?: Yes    PREVENTIVE SCREENINGS      Cardiovascular Screening:    General: Screening Not Indicated, History Lipid Disorder and Screening Current      Diabetes Screening:     General: Screening Not Indicated, History Diabetes and Screening Current      Colorectal Cancer Screening:     General: Screening Not Indicated      Breast Cancer Screening:     General: Screening Not Indicated      Cervical Cancer Screening:    General: Screening Not Indicated      Osteoporosis Screening:    General: Screening Not Indicated and History Osteoporosis      Abdominal Aortic Aneurysm (AAA) Screening:        General: Screening Not Indicated      Lung Cancer Screening:     General: Screening Not Indicated      Hepatitis C Screening:    General: Screening Not Indicated    Hep C Screening Accepted: No     Screening, Brief Intervention, and Referral to Treatment (SBIRT)    Screening  Typical number of drinks in a day: 1  Typical number of drinks in a week: 7  Interpretation: Low risk drinking behavior  AUDIT-C Screenin) How often did you have a drink containing alcohol in the past year? 2 to 3 times a week  2) How many drinks did you have on a typical day when you were drinking in the past year? 1 to 2  3) How often did you have 6 or more drinks on one occasion in the past year? never    AUDIT-C Score: 3  Interpretation: Score 3-12 (female): POSITIVE screen for alcohol misuse    AUDIT Screenin) How often during the last year have you found that you were not able to stop drinking once you had started?  0 - never  5) How often during the last year have you failed to do what was normally expected from you because of drinking? 0 - never  6) How often during the last year have you needed a first drink in the morning to get yourself going after a heavy drinking session? 0 - never  7) How often during the last year have you had a feeling of guilt or remorse after drinking? 0 - never  8) How often during the last year have you been unable to remember what happened the night before because you had been drinking? 0 - never  9) Have you or someone else been injured as a result of your drinking? 0 - no  10) Has a relative or friend or a doctor or another health worker been concerned about your drinking or suggested you cut down? 0 - no    AUDIT Score: 3  Interpretation: Low risk alcohol consumption    Single Item Drug Screening:  How often have you used an illegal drug (including marijuana) or a prescription medication for non-medical reasons in the past year? never    Single Item Drug Screen Score: 0  Interpretation: Negative screen for possible drug use disorder    Brief Intervention  Alcohol & drug use screenings were reviewed  No concerns regarding substance use disorder identified         Sheela Sutton MD

## 2022-05-03 NOTE — PATIENT INSTRUCTIONS
Medicare Preventive Visit Patient Instructions  Thank you for completing your Welcome to Medicare Visit or Medicare Annual Wellness Visit today  Your next wellness visit will be due in one year (5/4/2023)  The screening/preventive services that you may require over the next 5-10 years are detailed below  Some tests may not apply to you based off risk factors and/or age  Screening tests ordered at today's visit but not completed yet may show as past due  Also, please note that scanned in results may not display below  Preventive Screenings:  Service Recommendations Previous Testing/Comments   Colorectal Cancer Screening  * Colonoscopy    * Fecal Occult Blood Test (FOBT)/Fecal Immunochemical Test (FIT)  * Fecal DNA/Cologuard Test  * Flexible Sigmoidoscopy Age: 54-65 years old   Colonoscopy: every 10 years (may be performed more frequently if at higher risk)  OR  FOBT/FIT: every 1 year  OR  Cologuard: every 3 years  OR  Sigmoidoscopy: every 5 years  Screening may be recommended earlier than age 48 if at higher risk for colorectal cancer  Also, an individualized decision between you and your healthcare provider will decide whether screening between the ages of 74-80 would be appropriate  Colonoscopy: Not on file  FOBT/FIT: Not on file  Cologuard: Not on file  Sigmoidoscopy: Not on file    Screening Not Indicated     Breast Cancer Screening Age: 36 years old  Frequency: every 1-2 years  Not required if history of left and right mastectomy Mammogram: 05/28/2014        Cervical Cancer Screening Between the ages of 21-29, pap smear recommended once every 3 years  Between the ages of 33-67, can perform pap smear with HPV co-testing every 5 years     Recommendations may differ for women with a history of total hysterectomy, cervical cancer, or abnormal pap smears in past  Pap Smear: Not on file    Screening Not Indicated   Hepatitis C Screening Once for adults born between 1945 and 1965  More frequently in patients at high risk for Hepatitis C Hep C Antibody: Not on file        Diabetes Screening 1-2 times per year if you're at risk for diabetes or have pre-diabetes Fasting glucose: 146 mg/dL   A1C: 6 8 %    Screening Not Indicated  History Diabetes   Cholesterol Screening Once every 5 years if you don't have a lipid disorder  May order more often based on risk factors  Lipid panel: 01/21/2021    Screening Not Indicated  History Lipid Disorder     Other Preventive Screenings Covered by Medicare:  1  Abdominal Aortic Aneurysm (AAA) Screening: covered once if your at risk  You're considered to be at risk if you have a family history of AAA  2  Lung Cancer Screening: covers low dose CT scan once per year if you meet all of the following conditions: (1) Age 50-69; (2) No signs or symptoms of lung cancer; (3) Current smoker or have quit smoking within the last 15 years; (4) You have a tobacco smoking history of at least 30 pack years (packs per day multiplied by number of years you smoked); (5) You get a written order from a healthcare provider  3  Glaucoma Screening: covered annually if you're considered high risk: (1) You have diabetes OR (2) Family history of glaucoma OR (3)  aged 48 and older OR (3)  American aged 72 and older  3  Osteoporosis Screening: covered every 2 years if you meet one of the following conditions: (1) You're estrogen deficient and at risk for osteoporosis based off medical history and other findings; (2) Have a vertebral abnormality; (3) On glucocorticoid therapy for more than 3 months; (4) Have primary hyperparathyroidism; (5) On osteoporosis medications and need to assess response to drug therapy  · Last bone density test (DXA Scan): Not on file  5  HIV Screening: covered annually if you're between the age of 12-76  Also covered annually if you are younger than 13 and older than 72 with risk factors for HIV infection   For pregnant patients, it is covered up to 3 times per pregnancy  Immunizations:  Immunization Recommendations   Influenza Vaccine Annual influenza vaccination during flu season is recommended for all persons aged >= 6 months who do not have contraindications   Pneumococcal Vaccine (Prevnar and Pneumovax)  * Prevnar = PCV13  * Pneumovax = PPSV23   Adults 25-60 years old: 1-3 doses may be recommended based on certain risk factors  Adults 72 years old: Prevnar (PCV13) vaccine recommended followed by Pneumovax (PPSV23) vaccine  If already received PPSV23 since turning 65, then PCV13 recommended at least one year after PPSV23 dose  Hepatitis B Vaccine 3 dose series if at intermediate or high risk (ex: diabetes, end stage renal disease, liver disease)   Tetanus (Td) Vaccine - COST NOT COVERED BY MEDICARE PART B Following completion of primary series, a booster dose should be given every 10 years to maintain immunity against tetanus  Td may also be given as tetanus wound prophylaxis  Tdap Vaccine - COST NOT COVERED BY MEDICARE PART B Recommended at least once for all adults  For pregnant patients, recommended with each pregnancy  Shingles Vaccine (Shingrix) - COST NOT COVERED BY MEDICARE PART B  2 shot series recommended in those aged 48 and above     Health Maintenance Due:  There are no preventive care reminders to display for this patient  Immunizations Due:  There are no preventive care reminders to display for this patient  Advance Directives   What are advance directives? Advance directives are legal documents that state your wishes and plans for medical care  These plans are made ahead of time in case you lose your ability to make decisions for yourself  Advance directives can apply to any medical decision, such as the treatments you want, and if you want to donate organs  What are the types of advance directives? There are many types of advance directives, and each state has rules about how to use them   You may choose a combination of any of the following:  · Living will: This is a written record of the treatment you want  You can also choose which treatments you do not want, which to limit, and which to stop at a certain time  This includes surgery, medicine, IV fluid, and tube feedings  · Durable power of  for healthcare [facility]): This is a written record that states who you want to make healthcare choices for you when you are unable to make them for yourself  This person, called a proxy, is usually a family member or a friend  You may choose more than 1 proxy  · Do not resuscitate (DNR) order:  A DNR order is used in case your heart stops beating or you stop breathing  It is a request not to have certain forms of treatment, such as CPR  A DNR order may be included in other types of advance directives  · Medical directive: This covers the care that you want if you are in a coma, near death, or unable to make decisions for yourself  You can list the treatments you want for each condition  Treatment may include pain medicine, surgery, blood transfusions, dialysis, IV or tube feedings, and a ventilator (breathing machine)  · Values history: This document has questions about your views, beliefs, and how you feel and think about life  This information can help others choose the care that you would choose  Why are advance directives important? An advance directive helps you control your care  Although spoken wishes may be used, it is better to have your wishes written down  Spoken wishes can be misunderstood, or not followed  Treatments may be given even if you do not want them  An advance directive may make it easier for your family to make difficult choices about your care  Urinary Incontinence   Urinary incontinence (UI)  is when you lose control of your bladder  UI develops because your bladder cannot store or empty urine properly  The 3 most common types of UI are stress incontinence, urge incontinence, or both    Medicines:   · May be given to help strengthen your bladder control  Report any side effects of medication to your healthcare provider  Do pelvic muscle exercises often:  Your pelvic muscles help you stop urinating  Squeeze these muscles tight for 5 seconds, then relax for 5 seconds  Gradually work up to squeezing for 10 seconds  Do 3 sets of 15 repetitions a day, or as directed  This will help strengthen your pelvic muscles and improve bladder control  Train your bladder:  Go to the bathroom at set times, such as every 2 hours, even if you do not feel the urge to go  You can also try to hold your urine when you feel the urge to go  For example, hold your urine for 5 minutes when you feel the urge to go  As that becomes easier, hold your urine for 10 minutes  Self-care:   · Keep a UI record  Write down how often you leak urine and how much you leak  Make a note of what you were doing when you leaked urine  · Drink liquids as directed  You may need to limit the amount of liquid you drink to help control your urine leakage  Do not drink any liquid right before you go to bed  Limit or do not have drinks that contain caffeine or alcohol  · Prevent constipation  Eat a variety of high-fiber foods  Good examples are high-fiber cereals, beans, vegetables, and whole-grain breads  Walking is the best way to trigger your intestines to have a bowel movement  · Exercise regularly and maintain a healthy weight  Weight loss and exercise will decrease pressure on your bladder and help you control your leakage  · Use a catheter as directed  to help empty your bladder  A catheter is a tiny, plastic tube that is put into your bladder to drain your urine  · Go to behavior therapy as directed  Behavior therapy may be used to help you learn to control your urge to urinate  Alcohol Use and Your Health    Drinking too much can harm your health    Excessive alcohol use leads to about 88,000 death in the [place] each year, and shortens the life of those who diet by almost 30 years  Further, excessive drinking cost the economy $249 billion in 2010  Most excessive drinkers are not alcohol dependent  Excessive alcohol use has immediate effects that increase the risk of many harmful health conditions  These are most often the result of binge drinking  Over time, excessive alcohol use can lead to the development of chronic diseases and other series health problems  What is considered a "drink"? Excessive alcohol use includes:  · Binge Drinking: For women, 4 or more drinks consumed on one occasion  For men, 5 or more drinks consumed on one occasion  · Heavy Drinking: For women, 8 or more drinks per week  For men, 15 or more drinks per week  · Any alcohol used by pregnant women  · Any alcohol used by those under the age of 21 years    If you choose to drink, do so in moderation:  · Do not drink at all if you are under the age of 24, or if you are or may be pregnant, or have health problems that could be made worse by drinking    · For women, up to 1 drink per day  · For men, up to 2 drinks a day    No one should begin drinking or drink more frequently based on potential health benefits    Short-Term Health Risks:  · Injuries: motor vehicle crashes, falls, drownings, burns  · Violence: homicide, suicide, sexual assault, intimate partner violence  · Alcohol poisoning  · Reproductive health: risky sexual behaviors, unintended prengnacy, sexually transmitted diseases, miscarriage, stillbirth, fetal alcohol syndrome    Long-Term Health Risks:  · Chronic diseases: high blood pressure, heart disease, stroke, liver disease, digestive problems  · Cancers: breast, mouth and throat, liver, colon  · Learning and memory problems: dementia, poor school performance  · Mental health: depression, anxiety, insomnia  · Social problems: lost productivity, family problems, unemployment  · Alcohol dependence    For support and more information:  · Substance Abuse and [address] , [address] [place]  Web Address: https://[employer]/    · Alcoholics Anonymous        Web Address: http://www [employer] info/    https://www cdc gov/alcohol/fact-sheets/alcohol-use htm     © [address] 2018 Information is for End User's use only and may not be sold, redistributed or otherwise used for commercial purposes   All illustrations and images included in CareNotes® are the copyrighted property of [employer] A M , Inc  or [address] [place] [address]

## 2022-05-04 NOTE — ASSESSMENT & PLAN NOTE
Echo done last year - never saw cardio   Sent to [place] but never made the appt either   Mild STEVENSON

## 2022-05-04 NOTE — PROGRESS NOTES
Assessment/Plan:    1  Anxiety  -     venlafaxine (EFFEXOR-XR) 37 5 mg 24 hr capsule; Take 1 capsule (37 5 mg total) by mouth daily with breakfast    2  Dementia without behavioral disturbance, unspecified dementia type [facility])  Assessment & Plan: This is worsening   We can add in effexor instead of the SSRI       Orders:  -     venlafaxine (EFFEXOR-XR) 37 5 mg 24 hr capsule; Take 1 capsule (37 5 mg total) by mouth daily with breakfast    3  Type 2 diabetes mellitus without complication, without long-term current use of insulin ([facility])  -     Microalbumin / creatinine urine ratio  -     Lipid Panel with Direct LDL reflex; Future; Expected date: 05/03/2022  -     Blood Glucose Monitoring Suppl (OneTouch Verio Reflect) w/Device KIT; uad once daily  -     glucose blood (OneTouch Verio) test strip; Use as instructed once daily  -     POCT hemoglobin A1c    4  Hypothyroidism, unspecified type  -     TSH, 3rd generation with Free T4 reflex; Future    5  Essential hypertension  -     NT-BNP PRO; Future    6  Vitamin D deficiency  -     Vitamin D 25 hydroxy; Future    7  B12 deficiency  -     Vitamin B12; Future    8  Severe aortic stenosis  Assessment & Plan:  Echo done last year - never saw cardio   Sent to [place] but never made the appt either   Mild STEVENSON     Orders:  -     Echo complete w/ contrast if indicated; Future; Expected date: 05/03/2022    9  Primary hypertension  Assessment & Plan:  -stable/controlled, continue same medication  Will evaluate again next visit   A      10  Pulmonary hypertension ([address] )  Assessment & Plan:  -stable/controlled, continue same medication  Will evaluate again next visit   Echo this year            Subjective:      Patient ID: Joana Tabor is a 80 y o  female  HPI  CAD s/p MI @ 49 yo, stable on meds  Nadolol 40 q D, 81mg ASA  Did sees richa 1 / a year in the past but has nt seen him a long time   Needs new cardio     Carotid artery stenosis 50-69% Left side Right <50% repeat in 6/18 stable with Kamara - repeat in 2 2021  Carotid artery doppler <50% BL on 7/19/21    severe AS 0 9cm  P HTN: P pressures of 45-55mmHg    HTN -controlled on meds, no CP, Solange 20 q D, , Nadolol 40mg  norvasc2  5 12am 1pm    HLD: uncontrolled on meds, no myalgias, Lipitor 20 q d    DM 2 - controlled on meds, no hypoglycemia, metformin 500 ER 2 with dinner last A1c 6 1 from 7 2 to 5 7 to 6 1 to 5 6 to 6 6 will reapeat    HERBERTH - better with meds, xaanx 0 25 qd prn     Hypothyroidism - TSH 9 from 6 85 started 0 025mg better now at 5 0 then increased [id number] and could not tolerate it back to 25mcg    takes MVI and Ca 500 with Vit D 600 advised to double it    constipation: bob better with Metamucil    OP -2 5 in the lumbar spine 3/16 - does not want prolia    Anxeity: was on valium bid changed to zoloft then prozac then lexapro     norvasc 2 5 bid  nadolol 40mg for pvc am  lisinopril 20mg am        The following portions of the patient's history were reviewed and updated as appropriate: allergies, current medications, past family history, past medical history, past social history, past surgical history and problem list     Review of Systems   Constitutional: Negative for fever and unexpected weight change  HENT: Negative for nosebleeds and trouble swallowing  Eyes: Negative for visual disturbance  Respiratory: Positive for shortness of breath  Negative for chest tightness  Cardiovascular: Negative for chest pain, palpitations and leg swelling  Gastrointestinal: Negative for abdominal pain, constipation, diarrhea and nausea  Endocrine: Negative for cold intolerance  Genitourinary: Negative for dysuria and urgency  Musculoskeletal: Negative for joint swelling and myalgias  Skin: Negative for rash  Neurological: Negative for tremors, seizures and syncope  Hematological: Does not bruise/bleed easily     Psychiatric/Behavioral: Positive for behavioral problems, confusion, decreased concentration and sleep disturbance  Negative for hallucinations and suicidal ideas  The patient is nervous/anxious  Gave up driving          Objective:      /80 (BP Location: Left arm, Patient Position: Sitting, Cuff Size: Standard)   Pulse 74   Resp 16   Ht 5' (1 524 m)   Wt 52 2 kg (115 lb)   SpO2 98%   BMI 22 46 kg/m²     Office Visit on 05/03/2022   Component Date Value    Hemoglobin A1C 05/03/2022 6 3           Physical Exam  Vitals and nursing note reviewed  Constitutional:       Appearance: She is well-developed  Comments: Here with daughter    HENT:      Head: Normocephalic and atraumatic  Cardiovascular:      Rate and Rhythm: Normal rate and regular rhythm  Pulses: no weak pulses          Dorsalis pedis pulses are 2+ on the right side and 2+ on the left side  Heart sounds: Murmur heard  Pulmonary:      Effort: Pulmonary effort is normal       Breath sounds: Normal breath sounds  No wheezing or rales  Abdominal:      General: Bowel sounds are normal  There is no distension  Palpations: Abdomen is soft  Tenderness: There is no abdominal tenderness  Musculoskeletal:         General: No tenderness  Normal range of motion  Cervical back: Normal range of motion and neck supple  Feet:      Right foot:      Skin integrity: Callus and dry skin present  No ulcer, skin breakdown, erythema or warmth  Left foot:      Skin integrity: Callus and dry skin present  No ulcer, skin breakdown, erythema or warmth  Lymphadenopathy:      Cervical: No cervical adenopathy  Skin:     General: Skin is warm and dry  Capillary Refill: Capillary refill takes less than 2 seconds  Findings: No rash  Neurological:      Mental Status: She is alert and oriented to person, place, and time  Cranial Nerves: No cranial nerve deficit  Sensory: No sensory deficit  Motor: No abnormal muscle tone     Psychiatric:         Behavior: Behavior normal          Thought Content: Thought content normal          Judgment: Judgment normal          Patient's shoes and socks removed  Right Foot/Ankle   Right Foot Inspection  Skin Exam: skin normal, skin intact, dry skin, callus and callus  No warmth, no erythema, no maceration, no abnormal color, no pre-ulcer and no ulcer  Toe Exam: ROM and strength within normal limits  Sensory   Monofilament testing: intact    Vascular  Capillary refills: < 3 seconds  The right DP pulse is 2+  Left Foot/Ankle  Left Foot Inspection  Skin Exam: skin normal, skin intact, dry skin and callus  No warmth, no erythema, no maceration, normal color, no pre-ulcer and no ulcer  Toe Exam: ROM and strength within normal limits  Sensory   Monofilament testing: intact    Vascular  Capillary refills: < 3 seconds  The left DP pulse is 2+       Assign Risk Category  No deformity present  No loss of protective sensation  No weak pulses  Risk: 0          Tiffanie Price MD  [place] [id number]

## 2022-05-12 DIAGNOSIS — E03.8 OTHER SPECIFIED HYPOTHYROIDISM: ICD-10-CM

## 2022-05-12 DIAGNOSIS — E11.9 TYPE 2 DIABETES MELLITUS WITHOUT COMPLICATION, WITHOUT LONG-TERM CURRENT USE OF INSULIN (HCC): ICD-10-CM

## 2022-05-12 RX ORDER — METFORMIN HYDROCHLORIDE 500 MG/1
TABLET, EXTENDED RELEASE ORAL
Qty: 360 TABLET | Refills: 0 | Status: SHIPPED | OUTPATIENT
Start: 2022-05-12 | End: 2022-08-04

## 2022-05-12 RX ORDER — LEVOTHYROXINE SODIUM 0.03 MG/1
TABLET ORAL
Qty: 40 TABLET | Refills: 1 | Status: SHIPPED | OUTPATIENT
Start: 2022-05-12 | End: 2022-06-30 | Stop reason: SDUPTHER

## 2022-05-13 DIAGNOSIS — F41.9 ANXIETY: ICD-10-CM

## 2022-05-13 DIAGNOSIS — F03.90 DEMENTIA WITHOUT BEHAVIORAL DISTURBANCE, UNSPECIFIED DEMENTIA TYPE (HCC): Primary | ICD-10-CM

## 2022-05-13 RX ORDER — SERTRALINE HYDROCHLORIDE 25 MG/1
25 TABLET, FILM COATED ORAL DAILY
Qty: 30 TABLET | Refills: 5 | Status: SHIPPED | OUTPATIENT
Start: 2022-05-13 | End: 2022-07-24

## 2022-06-11 DIAGNOSIS — F41.9 ANXIETY: ICD-10-CM

## 2022-06-14 DIAGNOSIS — I10 ESSENTIAL HYPERTENSION: ICD-10-CM

## 2022-06-14 RX ORDER — ALPRAZOLAM 0.25 MG/1
TABLET ORAL
Qty: 30 TABLET | Refills: 5 | Status: SHIPPED | OUTPATIENT
Start: 2022-06-14

## 2022-06-14 RX ORDER — AMLODIPINE BESYLATE 2.5 MG/1
TABLET ORAL
Qty: 180 TABLET | Refills: 0 | Status: SHIPPED | OUTPATIENT
Start: 2022-06-14 | End: 2022-07-24

## 2022-06-14 RX ORDER — NADOLOL 40 MG/1
TABLET ORAL
Qty: 90 TABLET | Refills: 0 | Status: SHIPPED | OUTPATIENT
Start: 2022-06-14 | End: 2022-07-10

## 2022-06-14 RX ORDER — LISINOPRIL 20 MG/1
TABLET ORAL
Qty: 90 TABLET | Refills: 0 | Status: SHIPPED | OUTPATIENT
Start: 2022-06-14 | End: 2022-07-24

## 2022-06-29 LAB
CREAT UR-MCNC: 15.2 MG/DL
MICROALBUMIN UR-MCNC: 6.4 MG/L (ref 0–20)
MICROALBUMIN/CREAT 24H UR: 42 MG/G CREATININE (ref 0–30)

## 2022-06-29 PROCEDURE — 82043 UR ALBUMIN QUANTITATIVE: CPT | Performed by: FAMILY MEDICINE

## 2022-06-29 PROCEDURE — 82570 ASSAY OF URINE CREATININE: CPT | Performed by: FAMILY MEDICINE

## 2022-06-30 DIAGNOSIS — E03.8 OTHER SPECIFIED HYPOTHYROIDISM: ICD-10-CM

## 2022-06-30 DIAGNOSIS — E11.9 TYPE 2 DIABETES MELLITUS WITHOUT COMPLICATION, WITHOUT LONG-TERM CURRENT USE OF INSULIN (HCC): Primary | ICD-10-CM

## 2022-06-30 DIAGNOSIS — I10 PRIMARY HYPERTENSION: ICD-10-CM

## 2022-06-30 DIAGNOSIS — I27.20 PULMONARY HYPERTENSION (HCC): ICD-10-CM

## 2022-06-30 RX ORDER — LEVOTHYROXINE SODIUM 0.03 MG/1
TABLET ORAL
Qty: 180 TABLET | Refills: 1 | Status: SHIPPED | OUTPATIENT
Start: 2022-06-30

## 2022-07-08 PROBLEM — I50.1 PULMONARY EDEMA CARDIAC CAUSE (HCC): Status: ACTIVE | Noted: 2022-01-01

## 2022-07-08 PROBLEM — J96.01 ACUTE RESPIRATORY FAILURE WITH HYPOXIA (HCC): Status: ACTIVE | Noted: 2022-01-01

## 2022-07-08 NOTE — CONSULTS
Cardiology   Taina Linn 80 y o  female MRN: [id number]  Unit/Bed#: ICU 01 Encounter: [id number]      Reason for Consult / Principal Problem: Aortic stenosis, CHF    Physician Requesting Consult:  Sourav Espinoza MD    Outpatient Cardiologist:  Dr Navneet Duong  1  Acute CHF exacerbation; unclear if solely diastolic vs a combined systolic component  2  Valvular heart disease - including moderate moderate AS and moderate to severe MR  -On PE - appears mildly volume overloaded  Presence of bibasilar fine crackles  No significant JVD or peripheral edema  Currently on 6 L of supplemental O2   -Subjectively feels respiratory status/breathing significantly improved since admission  -    -Chest x-ray - diffuse pulmonary edema, and small bilateral pleural effusions  -TTE 3/2021 - EF 60%, no regional wall motion abnormalities, grade 2 DD, RV normal size/systolic function, LA dilated, RA normal, moderate to severe MR, moderate AS/mild AI (PG 45 mmhg, MG 30 mmhg, and MILE 0 48 cm2), mild TR / moderately elevated peak PA pressures  -Previously on no oral diuretics as an outpatient  -Currently on IV furosemide 40 mg BID  -24 hour I&O balance; -700 ml (after 1 dose of IV lasix 40)  3  Acute hypoxic respiratory failure - likely secondary to # 1/2  -Improved  -BiPAP weaned off  Currently on 6 L of supplemental O2   -On aggressive IV diuresis for volume management  4  Prior remote MI - at the age of 27 - unclear details surrounding this  No report of prior PCI or stenting   -No recent ischemic testing for review   -On statin and BB  5  Essential hypertension  -BP last recorded 170/74  -Outpatient BP regimen; amlodipine 2 5 mg b i d , lisinopril 20 mg daily, nadolol 40 mg daily  -Inpatient BP regimen; amlodipine 2 5 mg b i d , lisinopril 20 mg daily, and nadolol 40 mg daily  6   Dyslipidemia  -Lipid profile 6/29/22-cholesterol 124, triglycerides 79, HDL 58, and LDL 50    -On atorvastatin 40 mg daily  7  DM type 2 - well controlled  -HGB A1c 6 3   8  Hypothyroidism  -TSH 4 5 - 6/29/22  -On levothyroxine 25 mcg daily    Plan  -Obtain updated TTE to reassess the severity of her known valvular heart disease  Given her presentation there is certainly concern for progression of her valvular heart disease    -Would continue IV diuretics for today  She rapidly improved from a volume/respiratory perspective with just 1 dose of IV Lasix this morning so may only need an additional dose this evening   -Continue Norvasc - can increase to 5 mg daily (if needed for improved BP control) and continue lisinopril 20 mg daily    -Continue BB therapy   -Continue statin therapy   -Strict I&O monitoring, daily standing weights, 2 g NA +diet 1 8 L FR   -Monitor renal function and electrolytes closely, replete to maintain K +level 4 0, magnesium 2 0   -Continuous cardiopulmonary monitoring  HPI: Thanh Bar 80y o  year old female with a medical history of valvular heart disease including moderate AS, and moderate to severe MR, prior remote MI/CAD (unclear details), essential hypertension, dyslipidemia, DM type 2, hypothyroidism, cognitive decline/dementia, and anxiety disorder  She previously followed with Dr Julia Hampton however has not been evaluated by him in several years  Her PCP had recommended reestablishment with a Cardiology provider in the area and it appears an appointment was made with Dr Ricky Wood in September 2021 but the patient never made it to this appointment  She presented to the [facility] on 7/8/2022 progressively worsening SOB/STEVENSON  Further workup in the ED  Hemodynamics on admission  -Temp 96 8° F, HR 84, RR 26, /65, sats ranging in the 70s on RA  Laboratory data on admission  -NA +130, K +4 7, chloride 97, CO2 26, anion gap 7, BUN 16, creatinine 0 7, glucose 181, calcium 9 0, normal LFTs, albumin 3 8, WBC 8 7, HGB 11 6, platelet count 033    -HS troponin levels; 9 -- 37 -- 46  -BNP ; 343  Imaging  -Chest x-ray 7/8 - diffuse pulmonary edema and small bilateral pleural effusions  12 lead ECG demonstrated NSR, LBBB, rate 61 bpm    Upon arrival to the ED she was noted to have been in acute respiratory distress and profoundly hypoxic on room air saturating in the 70s  She was initiated on BiPAP therapy with improvement in her work of breathing  X-ray imaging suggestive of diffuse pulmonary edema and small bilateral pleural effusions  BNP level elevated at 343 raising the suspicion for an acute CHF exacerbation  She was initiated on a course of IV diuresis by the internal medicine service  Cardiology was consulted further treatment recommendations/management      Family History:   Family History   Problem Relation Age of Onset    Parkinsonism Mother     Heart attack Father     Coronary artery disease Sister         arteriosclerosis       Historical Information   Past Medical History:   Diagnosis Date    Anxiety     Coronary artery disease     as per stefani    Diabetes ([address] )     as per stef[place]    Disease of thyroid gland     Heart attack ([address] )     as per stef[place]    High cholesterol     as per [place]    HL (hearing loss)     Wears hearing aid, right    Hyperlipidemia     Hyperlipidemia     [place] per [place]    Hypertension     as per [place]     Past Surgical History:   Procedure Laterality Date    ADENOIDECTOMY      APPENDECTOMY      as per [place]    EYE SURGERY Bilateral 2015    as per [place]    STAPEDECTOMY      Metal STAPES LEFT ear (can not have an MRI)- [place] per [place]    TONSILLECTOMY      as per [place]     Social History   Social History     Substance and Sexual Activity   Alcohol Use Yes    Comment: occasional      Social History     Substance and Sexual Activity   Drug Use Never     Social History     Tobacco Use   Smoking Status Never Smoker   Smokeless Tobacco Never Used     Family History:   Family History   Problem Relation Age of Onset    Parkinsonism Mother     Heart attack Father     Coronary artery disease Sister         arteriosclerosis         Review of Systems:  Review of Systems   Constitutional: Positive for activity change and fatigue  Negative for chills and fever  Eyes: Negative for visual disturbance  Respiratory: Negative for cough, chest tightness and shortness of breath  Cardiovascular: Negative for chest pain, palpitations and leg swelling  Gastrointestinal: Negative for abdominal pain  Neurological: Negative for dizziness, light-headedness and headaches  All other systems reviewed and are negative            Scheduled Meds:  Current Facility-Administered Medications   Medication Dose Route Frequency Provider Last Rate    acetaminophen  650 mg Oral Q4H PRN Kath Shah MD      ALPRAZolam  0 25 mg Oral Daily PRN Kath Shah MD      amLODIPine  2 5 mg Oral Daily Kath Shah MD      atorvastatin  40 mg Oral Daily Kath Shah MD      vitamin B-12  1,000 mcg Oral Daily Kath Shah MD      docusate sodium  100 mg Oral BID Kath Shah MD      enoxaparin  30 mg Subcutaneous Daily Kath Shah MD      furosemide  40 mg Intravenous Daily Kath Shah MD      hydrALAZINE  5 mg Intravenous Q6H PRN Kath Shah MD      insulin lispro  1-5 Units Subcutaneous TID AC Chely Heller MD      insulin lispro  1-5 Units Subcutaneous HS Kath Shah MD      levothyroxine  25 mcg Oral Early Morning Kath Shah MD      lisinopril  20 mg Oral Daily Kath Shah MD      nadolol  40 mg Oral Daily Kath Shah MD      ondansetron  4 mg Intravenous Q6H PRN Kath Shah MD      potassium chloride  20 mEq Oral Daily Kath Shah MD      sertraline  25 mg Oral Daily Kath Shah MD       Continuous Infusions:   PRN Meds:   acetaminophen    ALPRAZolam    hydrALAZINE    ondansetron  all current active meds have been reviewed and current meds:   Current Facility-Administered Medications   Medication Dose Route Frequency    acetaminophen (TYLENOL) tablet 650 mg  650 mg Oral Q4H PRN    ALPRAZolam (XANAX) tablet 0 25 mg  0 25 mg Oral Daily PRN    amLODIPine (NORVASC) tablet 2 5 mg  2 5 mg Oral Daily    atorvastatin (LIPITOR) tablet 40 mg  40 mg Oral Daily    cyanocobalamin (VITAMIN B-12) tablet 1,000 mcg  1,000 mcg Oral Daily    docusate sodium (COLACE) capsule 100 mg  100 mg Oral BID    enoxaparin (LOVENOX) subcutaneous injection 30 mg  30 mg Subcutaneous Daily    furosemide (LASIX) injection 40 mg  40 mg Intravenous Daily    hydrALAZINE (APRESOLINE) injection 5 mg  5 mg Intravenous Q6H PRN    insulin lispro (HumaLOG) 100 units/mL subcutaneous injection 1-5 Units  1-5 Units Subcutaneous TID AC    insulin lispro (HumaLOG) 100 units/mL subcutaneous injection 1-5 Units  1-5 Units Subcutaneous HS    levothyroxine tablet 25 mcg  25 mcg Oral Early Morning    lisinopril (ZESTRIL) tablet 20 mg  20 mg Oral Daily    nadolol (CORGARD) tablet 40 mg  40 mg Oral Daily    ondansetron (ZOFRAN) injection 4 mg  4 mg Intravenous Q6H PRN    potassium chloride (K-DUR,KLOR-CON) CR tablet 20 mEq  20 mEq Oral Daily    sertraline (ZOLOFT) tablet 25 mg  25 mg Oral Daily       No Known Allergies    Objective   Vitals: Blood pressure 170/74, pulse 59, temperature 97 8 °F (36 6 °C), temperature source Oral, resp  rate 22, SpO2 91 %  , There is no height or weight on file to calculate BMI ,   Orthostatic Blood Pressures    Flowsheet Row Most Recent Value   Blood Pressure 170/74 filed at 07/08/2022 1327   Patient Position - Orthostatic VS Lying filed at 07/08/2022 0745            Intake/Output Summary (Last 24 hours) at 7/8/2022 1330  Last data filed at 7/8/2022 1310  Gross per 24 hour   Intake --   Output 711 ml   Net -711 ml       Invasive Devices  Report    Peripheral Intravenous Line  Duration           Peripheral IV 07/08/22 Right Antecubital <1 day              Physical Exam:  Physical Exam  Vitals and nursing note reviewed  Constitutional:       General: She is not in acute distress  Appearance: She is not diaphoretic  HENT:      Head: Normocephalic and atraumatic  Mouth/Throat:      Mouth: Mucous membranes are moist    Eyes:      General: No scleral icterus  Neck:      Comments: No significant JVD  Cardiovascular:      Rate and Rhythm: Normal rate and regular rhythm  Pulses: Normal pulses  Heart sounds: Murmur heard  Pulmonary:      Effort: Pulmonary effort is normal       Breath sounds: Rales present  No wheezing  Abdominal:      Palpations: Abdomen is soft  Musculoskeletal:      Cervical back: Neck supple  Right lower leg: No edema  Left lower leg: No edema  Skin:     General: Skin is warm and dry  Capillary Refill: Capillary refill takes less than 2 seconds  Neurological:      General: No focal deficit present  Mental Status: She is alert and oriented to person, place, and time     Psychiatric:         Mood and Affect: Mood normal          Lab Results:   Recent Results (from the past 24 hour(s))   FLU/RSV/COVID - if FLU/RSV clinically relevant    Collection Time: 07/08/22  7:42 AM    Specimen: Nose; Nares   Result Value Ref Range    SARS-CoV-2 Negative Negative    INFLUENZA A PCR Negative Negative    INFLUENZA B PCR Negative Negative    RSV PCR Negative Negative   ECG 12 lead    Collection Time: 07/08/22  7:43 AM   Result Value Ref Range    Ventricular Rate 61 BPM    Atrial Rate 61 BPM    MO Interval 148 ms    QRSD Interval 144 ms    QT Interval 474 ms    QTC Interval 477 ms    P Axis 60 degrees    QRS Axis 7 degrees    T Wave Axis 133 degrees   CBC and differential    Collection Time: 07/08/22  8:00 AM   Result Value Ref Range    WBC 8 72 4 31 - 10 16 Thousand/uL    RBC 3 74 (L) 3 81 - 5 12 Million/uL    Hemoglobin 11 6 11 5 - 15 4 g/dL    Hematocrit 35 8 34 8 - 46 1 %    MCV 96 82 - 98 fL    MCH 31 0 26 8 - 34 3 pg    MCHC 32 4 31 4 - 37 4 g/dL    RDW 13 8 11 6 - 15 1 %    MPV 10 8 8 9 - 12 7 fL    Platelets 925 324 - 256 Thousands/uL    nRBC 0 /100 WBCs    Neutrophils Relative 66 43 - 75 %    Immat GRANS % 1 0 - 2 %    Lymphocytes Relative 21 14 - 44 %    Monocytes Relative 9 4 - 12 %    Eosinophils Relative 2 0 - 6 %    Basophils Relative 1 0 - 1 %    Neutrophils Absolute 5 93 1 85 - 7 62 Thousands/µL    Immature Grans Absolute 0 05 0 00 - 0 20 Thousand/uL    Lymphocytes Absolute 1 79 0 60 - 4 47 Thousands/µL    Monocytes Absolute 0 75 0 17 - 1 22 Thousand/µL    Eosinophils Absolute 0 14 0 00 - 0 61 Thousand/µL    Basophils Absolute 0 06 0 00 - 0 10 Thousands/µL   Comprehensive metabolic panel    Collection Time: 07/08/22  8:00 AM   Result Value Ref Range    Sodium 130 (L) 135 - 147 mmol/L    Potassium 4 7 3 5 - 5 3 mmol/L    Chloride 97 96 - 108 mmol/L    CO2 26 21 - 32 mmol/L    ANION GAP 7 4 - 13 mmol/L    BUN 16 5 - 25 mg/dL    Creatinine 0 79 0 60 - 1 30 mg/dL    Glucose 181 (H) 65 - 140 mg/dL    Calcium 9 0 8 4 - 10 2 mg/dL    AST 21 13 - 39 U/L    ALT 11 7 - 52 U/L    Alkaline Phosphatase 85 34 - 104 U/L    Total Protein 7 1 6 4 - 8 4 g/dL    Albumin 3 8 3 5 - 5 0 g/dL    Total Bilirubin 0 91 0 20 - 1 00 mg/dL    eGFR 65 ml/min/1 73sq m   HS Troponin 0hr (reflex protocol)    Collection Time: 07/08/22  8:00 AM   Result Value Ref Range    hs TnI 0hr 9 "Refer to ACS Flowchart"- see link ng/L   B-Type Natriuretic Peptide(BNP) AN, CA, EA Campuses Only    Collection Time: 07/08/22  8:00 AM   Result Value Ref Range     (H) 0 - 100 pg/mL   HS Troponin I 2hr    Collection Time: 07/08/22  9:56 AM   Result Value Ref Range    hs TnI 2hr 37 "Refer to ACS Flowchart"- see link ng/L    Delta 2hr hsTnI 28 (H) <20 ng/L   Fingerstick Glucose (POCT)    Collection Time: 07/08/22 12:49 PM   Result Value Ref Range    POC Glucose 148 (H) 65 - 140 mg/dl   Platelet count    Collection Time: 07/08/22 12:52 PM   Result Value Ref Range    Platelets 000 806 - 390 Thousands/uL    MPV 10 7 8 9 - 12 7 fL     Imaging: I have personally reviewed pertinent reports  and I have personally reviewed pertinent films in PACS  Code Status: Level 1 full code  Epic/ Allscripts/Care Everywhere records reviewed:  Yes    * Please Note: Fluency DirectDictation voice to text software may have been used in the creation of this document   **

## 2022-07-08 NOTE — H&P
Brenna Cordova 38  9/17/1929, 80 y o  female MRN: [id number]  Unit/Bed#: ICU 01 Encounter: [id number]  Primary Care Provider: Albina Jose MD   Date and time admitted to hospital: 7/8/2022  7:23 AM    * Pulmonary edema cardiac cause [facility])  Assessment & Plan  Wt Readings from Last 3 Encounters:   05/03/22 52 2 kg (115 lb)   04/27/22 54 kg (119 lb)   02/24/22 54 kg (119 lb)     80-year-old female admitted with worsening shortness of breath over the past 2 days  Complains of orthopnea denies paroxysmal nocturnal dyspnea patient states that she has been noticing tiredness and shortness of breath needing to take a rest over the past few months but she thought it was due to old age  Has severe aortic stenosis and was told to follow-up with cardiology but has not done so silhouette  No prior history of CHF on PCP notes  X-ray chest revealing pulmonary edema  Patient responding well to IV diuresis  Was not on diuretics at home  Has responded well to IV Lasix here  Will put her on IV Lasix 40 twice a day  Strict input output and daily weights  Will consult Cardiology  Pulmonary edema likely secondary to severe aortic stenosis with diastolic dysfunction        Acute respiratory failure with hypoxia [facility])  Assessment & Plan  Secondary to pulmonary edema  Initially required BiPAP with O2 sats in the 70s currently with IV Lasix breathing has improved she is currently on 5 L nasal cannula saturating  between 88-90%    Severe aortic stenosis  Assessment & Plan  Patient noted to have severe aortic stenosis with a valve size of 0 9 cm2  Was referred to see Cardiology as outpatient however she never made it  Also has additional pulmonary hypertension  We will have Cardiology evaluate    Discussed with daughter-they would prefer her to be treated conservatively    Dementia without behavioral disturbance, unspecified dementia type [facility])  Assessment & Plan  Patient with Alzheimer's dementia with recent worsening confusion  PCP had started patient on Effexor    Pulmonary hypertension ([address] )  Assessment & Plan  With a pulmonary artery pressure between 45-55 mm  All of this contributing to her pulmonary edema  Diabetes [facility])  Assessment & Plan  Lab Results   Component Value Date    HGBA1C 6 3 05/03/2022       No results for input(s): POCGLU in the last 72 hours  Blood Sugar Average: Last 72 hrs:  Well-controlled is on metformin at home which I will hold while inpatient and keep her on a sliding scale coverage      Hypertension  Assessment & Plan  Blood pressure is stable  Continue amlodipine 2 5 mg daily, lisinopril and nadolol    Coronary artery disease  Assessment & Plan  Had coronary artery disease status post MI at age 48 has been relatively stable but has not followed up with Cardiology for quite a while      H&P Exam - Kinga Alcaraz 80 y o  female MRN: [id number]    Unit/Bed#: ICU 01 Encounter: [id number]    HPI:  Kinga Alcaraz is a 80 y o  female with a past medical history significant for Alzheimer's dementia with cognitive decline, moderate to severe aortic stenosis, pulmonary hypertension, MI at age 48, diabetes mellitus type 2, hypertension, hypothyroidism-who presents with worsening shortness of breath over the past month much worse over the past 2 days  In the ED she was noted to be hypoxic in the 70s and needed BiPAP  x-ray revealing pulmonary edema  Patient has a history of severe aortic stenosis with a valve size of 0 9 cm2 and was referred to Cardiology [place] she has not seen as yet  In addition she also has pulmonary hypertension  Her blood pressure is usually well controlled  Patient has no prior history of CHF and is not on any diuretics at home  Currently she feels well with IV diuretics states that breathing is improved  She has been transitioned to nasal cannula 6 liters/minute  Denies any chest pain palpitations          Historical Information   Past Medical History:   Diagnosis Date    Anxiety     Coronary artery disease     as per [place]    Diabetes ([facility] [address] )     as per [place]    Disease of thyroid gland     Heart attack ([facility] )     [place] per [place]    High cholesterol     as per [place]    HL (hearing loss)     Wears hearing aid, right    Hyperlipidemia     Hyperlipidemia     [place] per [place]    Hypertension     as per [place]     Patient Active Problem List   Diagnosis    Hyperlipidemia    Hypertension    Asymptomatic bilateral carotid artery stenosis    Diabetes ([facility] )    Other specified hypothyroidism    Coronary artery disease    HL (hearing loss)    Anxiety    Slow transit constipation    Age-related osteoporosis without current pathological fracture    Severe aortic stenosis    Pulmonary hypertension (HCC)    Dementia without behavioral disturbance, unspecified dementia type ([facility] )    Pulmonary edema cardiac cause (HCC)    Acute respiratory failure with hypoxia (HCC)     Past Surgical History:   Procedure Laterality Date    ADENOIDECTOMY      APPENDECTOMY      as per [place]    EYE SURGERY Bilateral 2015    as per [place]    STAPEDECTOMY      Metal STAPES LEFT ear (can not have an MRI)- as per [place]    TONSILLECTOMY      as per [place]       Social History   Social History     Substance and Sexual Activity   Alcohol Use Yes    Comment: occasional      Social History     Substance and Sexual Activity   Drug Use Never     Social History     Tobacco Use   Smoking Status Never Smoker   Smokeless Tobacco Never Used       Family History:   Family History   Problem Relation Age of Onset    Parkinsonism Mother     Heart attack Father     Coronary artery disease Sister         arteriosclerosis         Meds/Allergies       Current Facility-Administered Medications:     acetaminophen (TYLENOL) tablet 650 mg, 650 mg, Oral, Q4H PRN, Franki Marrero MD    ALPRAZolam (XANAX) tablet 0 25 mg, 0 25 mg, Oral, Daily PRN, Penny Rodriguez MD [place]    amLODIPine (NORVASC) tablet 2 5 mg, 2 5 mg, Oral, Daily, Franki Marrero MD    atorvastatin (LIPITOR) tablet 40 mg, 40 mg, Oral, Daily, Franki Marrero MD    cyanocobalamin (VITAMIN B-12) tablet 100 mcg, 100 mcg, Oral, Daily, Franki Marrero MD    docusate sodium (COLACE) capsule 100 mg, 100 mg, Oral, BID, Franki Marrero MD    enoxaparin (LOVENOX) subcutaneous injection 40 mg, 40 mg, Subcutaneous, Daily, Franki Marrero MD    furosemide (LASIX) injection 40 mg, 40 mg, Intravenous, Daily, Franki Marrero MD    levothyroxine tablet 25 mcg, 25 mcg, Oral, Early Morning, Chely Heller MD    lisinopril (ZESTRIL) tablet 20 mg, 20 mg, Oral, Daily, Franki Marrero MD    nadolol (CORGARD) tablet 40 mg, 40 mg, Oral, Daily, Chely Heller MD    ondansetron (ZOFRAN) injection 4 mg, 4 mg, Intravenous, Q6H PRN, Franki Marrero MD    potassium chloride (K-DUR,KLOR-CON) CR tablet 20 mEq, 20 mEq, Oral, Daily, Franki Marrero MD    sertraline (ZOLOFT) tablet 25 mg, 25 mg, Oral, Daily, Franki Marrero MD    No Known Allergies    Review of Systems  A detailed 12 point review of systems was conducted and is negative apart from those mentioned in the HPI  Objective   Vitals: Blood pressure (!) 180/84, pulse 74, temperature 97 8 °F (36 6 °C), temperature source Oral, resp  rate 22, SpO2 91 %  Physical Exam   HEENT: PERRLA, EOMI, sclera anicteric, dry mucous membranes, tongue mucosa dry without lesions  Neck: supple, no JVD, lymphadenopathy, thyromegaly  Heart: Regular rate and rhythm, S1S2 present  No murmur, rub or gallop  Lungs; bibasilar rales  Abdomen: soft, non-tender, non-distended, NABS  No guarding or rebound  No peritoneal sound or mass  Extremities: no clubbing, cyanosis, or edema  2+ pedal pulses bilaterally  Full range of motion  Neurologic:  Cranial nerves II-XII intact  Strength and sensation globally intact  Speech fluent and goal directed  Awake, alert and oriented x 3  Skin: warm and dry  No petechiae, purpura or rash  Lab Results:     Results from last 7 days   Lab Units 07/08/22  0800   WBC Thousand/uL 8 72   HEMOGLOBIN g/dL 11 6   HEMATOCRIT % 35 8   PLATELETS Thousands/uL 307   NEUTROS PCT % 66   LYMPHS PCT % 21   MONOS PCT % 9   EOS PCT % 2     Results from last 7 days   Lab Units 07/08/22  0800   POTASSIUM mmol/L 4 7   CHLORIDE mmol/L 97   CO2 mmol/L 26   BUN mg/dL 16   CREATININE mg/dL 0 79   CALCIUM mg/dL 9 0   ALK PHOS U/L 85   ALT U/L 11   AST U/L 21               Imaging:  X-ray chest acute pulmonary edema  Personally reviewed    EKG sinus tachycardia with no evidence of any acute ischemia            Code Status: Level 3 - DNAR and DNI      Counseling / Coordination of Care  Total floor / unit time spent today 33 minutes  Greater than 50% of total time was spent with the patient and / or family counseling and / or coordination of care  Portions of the record may have been created with voice recognition software  Occasional wrong word or "sound a like" substitutions may have occurred due to the inherent limitations of voice recognition software  Read the chart carefully and recognize, using context, where substitutions have occurred

## 2022-07-08 NOTE — ASSESSMENT & PLAN NOTE
Secondary to pulmonary edema    Initially required BiPAP with O2 sats in the 70s currently with IV Lasix breathing has improved she is currently on 5 L nasal cannula saturating  between 88-90%

## 2022-07-08 NOTE — ED ATTENDING ATTESTATION
7/8/2022  IPattie MD, saw and evaluated the patient  I have discussed the patient with the resident/non-physician practitioner and agree with the resident's/non-physician practitioner's findings, Plan of Care, and MDM as documented in the resident's/non-physician practitioner's note, except where noted  All available labs and Radiology studies were reviewed  I was present for key portions of any procedure(s) performed by the resident/non-physician practitioner and I was immediately available to provide assistance  At this point I agree with the current assessment done in the Emergency Department  I have conducted an independent evaluation of this patient a history and physical is as follows:    20-year-old female with history of pulmonary hypertension, GD2 diastolic dysfunction with LVEF of 60% on last echocardiogram in March of 2021, severe aortic stenosis  Reports onset of shortness of breath this morning  Physical Exam  Constitutional:       General: She is in acute distress  Appearance: She is well-developed  She is not diaphoretic  HENT:      Head: Normocephalic and atraumatic  Right Ear: External ear normal       Left Ear: External ear normal       Nose: Nose normal    Eyes:      Conjunctiva/sclera: Conjunctivae normal    Cardiovascular:      Rate and Rhythm: Normal rate and regular rhythm  Pulses: Normal pulses  Heart sounds: Normal heart sounds  No murmur heard  No friction rub  No gallop  Pulmonary:      Effort: Respiratory distress (crackles heard throughout all lung fields, worse at the bases) present  Breath sounds: Normal breath sounds  No wheezing or rales  Abdominal:      General: Bowel sounds are normal  There is no distension  Palpations: Abdomen is soft  Tenderness: There is no abdominal tenderness  There is no guarding  Musculoskeletal:         General: No deformity  Normal range of motion        Cervical back: Normal range of motion and neck supple  Right lower leg: No edema  Left lower leg: No edema  Skin:     General: Skin is warm and dry  Neurological:      Mental Status: She is alert and oriented to person, place, and time  Motor: No abnormal muscle tone  Psychiatric:         Mood and Affect: Mood normal              ED Course  ED Course as of 07/08/22 1709   Fri Jul 08, 2022   3089 Lungs with diffuse rales and crackles  Patient is satting 89-90% on CPAP  Will trans for over to BiPAP  She was given 2 DuoNebs and 2 sublingual nitroglycerin by EMS, blood pressure was 719 systolic for EMS, 036/53 here  She denies any chest pain  Twelve lead transplanted by EMS showed a left bundle-branch block, will repeat on arrival here  Patient is A&O x4, denies any chest pain whatsoever  7923 CXR with significant pulmonary edema  Suspect CHF exacerbation  Sats improved on bipap  Will plan to give diuretics  1330 Patient's sats have been 98% on bipap  Will trial off of bipap  1046 Patient sating 92-94% off of bipap on 6L NC  Repeat troponin trending up to 37 (delta 28) from 9  Suspect in the setting of CHF exacerbation rather than ACS in the absence of CP  Patient admitted to [facility] under SD2            Critical Care Time  CriticalCare Time  Performed by: Carlos Culver MD  Authorized by: Carlos Culver MD     Critical care provider statement:     Critical care time (minutes):  35    Critical care time was exclusive of:  Separately billable procedures and treating other patients and teaching time    Critical care was necessary to treat or prevent imminent or life-threatening deterioration of the following conditions:  Respiratory failure (respiratory failure 2/2 pulmonary edema requiring bipap)    Critical care was time spent personally by me on the following activities:  Obtaining history from patient or surrogate, development of treatment plan with patient or surrogate, evaluation of patient's response to treatment, examination of patient, discussions with consultants, ordering and performing treatments and interventions, ordering and review of laboratory studies, ordering and review of radiographic studies, re-evaluation of patient's condition and review of old charts    I assumed direction of critical care for this patient from another provider in my specialty: no

## 2022-07-08 NOTE — ASSESSMENT & PLAN NOTE
Wt Readings from Last 3 Encounters:   05/03/22 52 2 kg (115 lb)   04/27/22 54 kg (119 lb)   02/24/22 54 kg (119 lb)     15-year-old female admitted with worsening shortness of breath over the past 2 days  Complains of orthopnea denies paroxysmal nocturnal dyspnea patient states that she has been noticing tiredness and shortness of breath needing to take a rest over the past few months but she thought it was due to old age  Has severe aortic stenosis and was told to follow-up with cardiology but has not done so silhouette  No prior history of CHF on PCP notes  X-ray chest revealing pulmonary edema  Patient responding well to IV diuresis  Was not on diuretics at home  Has responded well to IV Lasix here  Will put her on IV Lasix 40 twice a day  Strict input output and daily weights    Will consult Cardiology  Pulmonary edema likely secondary to severe aortic stenosis with diastolic dysfunction

## 2022-07-08 NOTE — ASSESSMENT & PLAN NOTE
Patient noted to have severe aortic stenosis with a valve size of 0 9 cm2  Was referred to see Cardiology as outpatient however she never made it  Also has additional pulmonary hypertension  We will have Cardiology evaluate    Discussed with daughter-they would prefer her to be treated conservatively

## 2022-07-08 NOTE — ASSESSMENT & PLAN NOTE
Lab Results   Component Value Date    HGBA1C 6 3 05/03/2022       No results for input(s): POCGLU in the last 72 hours      Blood Sugar Average: Last 72 hrs:  Well-controlled is on metformin at home which I will hold while inpatient and keep her on a sliding scale coverage

## 2022-07-08 NOTE — ASSESSMENT & PLAN NOTE
Had coronary artery disease status post MI at age 48 has been relatively stable but has not followed up with Cardiology for quite a while

## 2022-07-08 NOTE — ED NOTES
Per EMS: call for difficulty breathing starting 2 hours ago  Room air sats 58% upon arrival of EMS  Improved to 89-92% once on cpap, 2 duonebs and solumedrol given as well as 2 SL nitro       Farideh Banda RN  07/08/22 5934

## 2022-07-08 NOTE — ASSESSMENT & PLAN NOTE
Patient with Alzheimer's dementia with recent worsening confusion    PCP had started patient on Effexor

## 2022-07-08 NOTE — ED PROVIDER NOTES
History  Chief Complaint   Patient presents with    Shortness of [address] [facility] emergency department after experiencing profound shortness of breath and difficulty breathing while at home  Patient states that she has never had an experience of shortness of breath like this in the past   Patient does describe that she has a past medical history of congestive heart failure and heart problems  As reported by EMS during their report, patient was noted to be hypoxic to the low 80s on room air  Patient was provided with supplemental oxygen with minimal improvement in oxygen saturation  Patient was started on CPAP during transportation to the emergency department with sector lesions coming up to the high 80s  Patient denies any chest pain, cough, fevers, chills, nausea, vomiting, loss of consciousness, changes in vision, changes in hearing, abdominal pain, changes in urination, or changes in bowel movement  Patient denies any recent sick contacts or travels  Based off of presenting complaints to the emergency department, patient was also started on BiPAP with significant improvement in her symptoms and work of breathing  Patient was conversational and making jokes at time of evaluation in the emergency department  Oxygen saturation while on BiPAP was noted to go up to 100%        History provided by:  Patient, EMS personnel and relative   used: No    Shortness of Breath  Severity:  Moderate  Onset quality:  Sudden  Duration:  1 hour  Timing:  Constant  Progression:  Worsening  Chronicity:  New  Context: not activity, not animal exposure, not emotional upset, not fumes, not known allergens, not occupational exposure, not pollens, not smoke exposure, not strong odors, not URI and not weather changes    Relieved by:  Nothing  Worsened by:  Exertion  Ineffective treatments:  None tried  Associated symptoms: no abdominal pain, no chest pain, no claudication, no cough, no diaphoresis, no ear pain, no fever, no headaches, no hemoptysis, no neck pain, no PND, no rash, no sore throat, no sputum production, no syncope, no swollen glands, no vomiting and no wheezing    Risk factors: no prolonged immobilization and no recent surgery        Prior to Admission Medications   Prescriptions Last Dose Informant Patient Reported? Taking?    ALPRAZolam (XANAX) 0 25 mg tablet 7/8/2022 at Unknown time  No Yes   Sig: TAKE ONE TABLET BY MOUTH EVERY DAY AS NEEDED   Blood Glucose Monitoring Suppl (OneTouch Verio Reflect) w/Device KIT 7/7/2022 at Unknown time  No Yes   Sig: uad once daily   Lancets (freestyle) lancets 7/7/2022 at Unknown time  No Yes   Sig: TEST BLOOD SUGAR ONCE IN THE MORNING   amLODIPine (NORVASC) 2 5 mg tablet 7/7/2022 at Unknown time  No Yes   Sig: TAKE ONE TABLET BY MOUTH EVERY MORNING AND ONE TABLET BY MOUTH EVERY EVENING   atorvastatin (LIPITOR) 40 mg tablet 7/7/2022 at Unknown time  No Yes   Sig: TAKE ONE TABLET BY MOUTH EVERY DAY   betamethasone valerate (VALISONE) 0 1 % cream More than a month at Unknown time  No No   Sig: Apply topically 2 (two) times a day   cholecalciferol (VITAMIN D3) 1,000 units tablet 7/7/2022 at Unknown time  Yes Yes   Sig: Take 1,000 Units by mouth daily   donepezil (Aricept) 10 mg tablet Not Taking at Unknown time  No No   Sig: Take 1 tablet (10 mg total) by mouth daily at bedtime   Patient not taking: Reported on 7/8/2022   fluocinolone acetonide (DERMOTIC) 0 01 % otic oil Past Month at Unknown time  No Yes   Sig: Administer 5 drops into both ears 2 (two) times a day as needed (itching)   glucose blood (OneTouch Verio) test strip 7/7/2022 at Unknown time  No Yes   Sig: Use as instructed once daily   levothyroxine 25 mcg tablet 7/8/2022 at Unknown time  No Yes   Sig: TAKE 2 TABLETS BY MOUTH daily other than M and W which you take 1 daily   lisinopril (ZESTRIL) 20 mg tablet 7/7/2022 at Unknown time  No Yes   Sig: TAKE ONE TABLET BY MOUTH EVERY DAY metFORMIN (GLUCOPHAGE-XR) 500 mg 24 hr tablet 7/7/2022 at Unknown time  No Yes   Sig: TAKE 2 TABLETS BY MOUTH TWICE A DAY WITH MEALS   nadolol (CORGARD) 40 mg tablet 7/7/2022 at Unknown time  No Yes   Sig: TAKE ONE TABLET BY MOUTH EVERY DAY   neomycin-polymyxin-hydrocortisone (CORTISPORIN) 0 35%-10,000 units/mL-1% otic suspension   No No   Sig: Administer 4 drops to the right ear 2 (two) times a day for 10 days   sertraline (Zoloft) 25 mg tablet 7/7/2022 at Unknown time  No Yes   Sig: Take 1 tablet (25 mg total) by mouth in the morning  vitamin B-12 (VITAMIN B-12) 1,000 mcg tablet 7/7/2022 at Unknown time  Yes Yes   Sig: Take by mouth daily      Facility-Administered Medications: None       Past Medical History:   Diagnosis Date    Anxiety     Coronary artery disease     as per stefani    Diabetes ([address] )     as per stefani    Disease of thyroid gland     Heart attack ([address] )     as per stefani    High cholesterol     as per Rozina Schroeder    HL (hearing loss)     Wears hearing aid, right    Hyperlipidemia     Hyperlipidemia     as per Rozina Schroeder    Hypertension     as per Rozina Schroeder       Past Surgical History:   Procedure Laterality Date    ADENOIDECTOMY      APPENDECTOMY      as per Kewadin    EYE SURGERY Bilateral 2015    as per Rozina Schroeder    STAPEDECTOMY      Metal STAPES LEFT ear (can not have an MRI)- as per Buck Weir TONSILLECTOMY      as per Rozina Schroeder       Family History   Problem Relation Age of Onset    Parkinsonism Mother     Heart attack Father     Coronary artery disease Sister         arteriosclerosis       I have reviewed and agree with the history as documented      E-Cigarette/Vaping    E-Cigarette Use Never User      E-Cigarette/Vaping Substances     Social History     Tobacco Use    Smoking status: Never Smoker    Smokeless tobacco: Never Used   Vaping Use    Vaping Use: Never used   Substance Use Topics    Alcohol use: Yes     Comment: occasional     Drug use: Never        Review of Systems Constitutional: Negative for chills, diaphoresis and fever  HENT: Negative for ear pain and sore throat  Eyes: Negative for pain and visual disturbance  Respiratory: Positive for shortness of breath  Negative for cough, hemoptysis, sputum production and wheezing  Cardiovascular: Negative for chest pain, palpitations, claudication, syncope and PND  Gastrointestinal: Negative for abdominal pain and vomiting  Genitourinary: Negative for dysuria and hematuria  Musculoskeletal: Negative for arthralgias, back pain and neck pain  Skin: Negative for color change and rash  Neurological: Negative for seizures, syncope and headaches  All other systems reviewed and are negative  Physical Exam  ED Triage Vitals   Temperature Pulse Respirations Blood Pressure SpO2   07/08/22 0757 07/08/22 0727 07/08/22 0727 07/08/22 0727 07/08/22 0727   (!) 96 8 °F (36 °C) 84 (!) 26 124/65 (!) 84 %      Temp Source Heart Rate Source Patient Position - Orthostatic VS BP Location FiO2 (%)   07/08/22 0757 07/08/22 0727 07/08/22 0727 07/08/22 0727 07/08/22 0732   Rectal Monitor Sitting Right arm 50      Pain Score       --                    Orthostatic Vital Signs  Vitals:    07/08/22 1200 07/08/22 1327 07/08/22 1515 07/08/22 1548   BP: (!) 179/82 170/74 (!) 180/84 156/72   Pulse: 63 59 65 56   Patient Position - Orthostatic VS:    Lying       Physical Exam  Vitals and nursing note reviewed  Constitutional:       General: She is not in acute distress  Appearance: She is well-developed  HENT:      Head: Normocephalic and atraumatic  Eyes:      Extraocular Movements: Extraocular movements intact  Conjunctiva/sclera: Conjunctivae normal       Pupils: Pupils are equal, round, and reactive to light  Cardiovascular:      Rate and Rhythm: Regular rhythm  Tachycardia present  Heart sounds: No murmur heard  Pulmonary:      Effort: Tachypnea and respiratory distress present     Chest:      Chest wall: No deformity, tenderness or crepitus  Abdominal:      Palpations: Abdomen is soft  Tenderness: There is no abdominal tenderness  Musculoskeletal:         General: Normal range of motion  Cervical back: Neck supple  Right lower leg: No tenderness  No edema  Left lower leg: No tenderness  No edema  Skin:     General: Skin is warm and dry  Capillary Refill: Capillary refill takes less than 2 seconds  Neurological:      General: No focal deficit present  Mental Status: She is alert     Psychiatric:         Mood and Affect: Mood normal          ED Medications  Medications   sertraline (ZOLOFT) tablet 25 mg (25 mg Oral Given 7/8/22 1327)   ALPRAZolam (XANAX) tablet 0 25 mg (has no administration in time range)   amLODIPine (NORVASC) tablet 2 5 mg (2 5 mg Oral Given 7/8/22 1328)   atorvastatin (LIPITOR) tablet 40 mg (40 mg Oral Given 7/8/22 1327)   levothyroxine tablet 25 mcg (25 mcg Oral Given 7/8/22 1326)   lisinopril (ZESTRIL) tablet 20 mg (20 mg Oral Given 7/8/22 1327)   nadolol (CORGARD) tablet 40 mg (40 mg Oral Given 7/8/22 1327)   docusate sodium (COLACE) capsule 100 mg (100 mg Oral Given 7/8/22 1327)   ondansetron (ZOFRAN) injection 4 mg (has no administration in time range)   potassium chloride (K-DUR,KLOR-CON) CR tablet 20 mEq (20 mEq Oral Given 7/8/22 1327)   acetaminophen (TYLENOL) tablet 650 mg (has no administration in time range)   furosemide (LASIX) injection 40 mg (has no administration in time range)   insulin lispro (HumaLOG) 100 units/mL subcutaneous injection 1-5 Units (1 Units Subcutaneous Not Given 7/8/22 1303)   insulin lispro (HumaLOG) 100 units/mL subcutaneous injection 1-5 Units (has no administration in time range)   hydrALAZINE (APRESOLINE) injection 5 mg (has no administration in time range)   enoxaparin (LOVENOX) subcutaneous injection 30 mg (30 mg Subcutaneous Given 7/8/22 1302)   cyanocobalamin (VITAMIN B-12) tablet 1,000 mcg (1,000 mcg Oral Given 7/8/22 1302)   ipratropium-albuterol (FOR EMS ONLY) (DUO-NEB) 0 5-2 5 mg/3 mL inhalation solution 6 mL (0 mL Does not apply Given to EMS 7/8/22 0734)   methylPREDNISolone sodium succinate (FOR EMS ONLY) (Solu-MEDROL) 125 MG injection 125 mg (0 mg Does not apply Given to EMS 7/8/22 0734)   nitroglycerin (FOR EMS ONLY) (NITROSTAT) SL tablet 10 mg (0 mg Does not apply Given to EMS 7/8/22 0736)   furosemide (LASIX) injection 40 mg (40 mg Intravenous Given 7/8/22 0957)       Diagnostic Studies  Results Reviewed     Procedure Component Value Units Date/Time    HS Troponin I 4hr [[id number]]  (Abnormal) Collected: 07/08/22 1252    Lab Status: Final result Specimen: Blood from Arm, Left Updated: 07/08/22 1335     hs TnI 4hr 46 ng/L      Delta 4hr hsTnI 37 ng/L     HS Troponin I 2hr [[id number]]  (Abnormal) Collected: 07/08/22 0956    Lab Status: Final result Specimen: Blood from Line Updated: 07/08/22 1038     hs TnI 2hr 37 ng/L      Delta 2hr hsTnI 28 ng/L     FLU/RSV/COVID - if FLU/RSV clinically relevant [[id number]]  (Normal) Collected: 07/08/22 0742    Lab Status: Final result Specimen: Nares from Nose Updated: 07/08/22 0848     SARS-CoV-2 Negative     INFLUENZA A PCR Negative     INFLUENZA B PCR Negative     RSV PCR Negative    Narrative:      FOR PEDIATRIC PATIENTS - copy/paste COVID Guidelines URL to browser: [email or web address]    SARS-CoV-2 assay is a Nucleic Acid Amplification assay intended for the  qualitative detection of nucleic acid from SARS-CoV-2 in nasopharyngeal  swabs  Results are for the presumptive identification of SARS-CoV-2 RNA  Positive results are indicative of infection with SARS-CoV-2, the virus  causing COVID-19, but do not rule out bacterial infection or co-infection  with other viruses  Laboratories within the [place] and its  territories are required to report all positive results to the appropriate  public health authorities   Negative results do not preclude SARS-CoV-2  infection and should not be used as the sole basis for treatment or other  patient management decisions  Negative results must be combined with  clinical observations, patient history, and epidemiological information  This test has not been FDA cleared or approved  This test has been authorized by FDA under an Emergency Use Authorization  (EUA)  This test is only authorized for the duration of time the  declaration that circumstances exist justifying the authorization of the  emergency use of an in vitro diagnostic tests for detection of SARS-CoV-2  virus and/or diagnosis of COVID-19 infection under section 564(b)(1) of  the Act, 21 U  S C  662HXE-2(C)(1), unless the authorization is terminated  or revoked sooner  The test has been validated but independent review by FDA  and CLIA is pending  Test performed using [employer] GeneXpert: This RT-PCR assay targets N2,  a region unique to SARS-CoV-2  A conserved region in the E-gene was chosen  for pan-Sarbecovirus detection which includes SARS-CoV-2      HS Troponin 0hr (reflex protocol) [[id number]]  (Normal) Collected: 07/08/22 0800    Lab Status: Final result Specimen: Blood from Arm, Right Updated: 07/08/22 0840     hs TnI 0hr 9 ng/L     B-Type Natriuretic Peptide(BNP) , [place],  Campuses Only [[id number]]  (Abnormal) Collected: 07/08/22 0800    Lab Status: Final result Specimen: Blood from Arm, Right Updated: 07/08/22 0840      pg/mL     Comprehensive metabolic panel [[id number]]  (Abnormal) Collected: 07/08/22 0800    Lab Status: Final result Specimen: Blood from Arm, Right Updated: 07/08/22 0836     Sodium 130 mmol/L      Potassium 4 7 mmol/L      Chloride 97 mmol/L      CO2 26 mmol/L      ANION GAP 7 mmol/L      BUN 16 mg/dL      Creatinine 0 79 mg/dL      Glucose 181 mg/dL      Calcium 9 0 mg/dL      AST 21 U/L      ALT 11 U/L      Alkaline Phosphatase 85 U/L      Total Protein 7 1 g/dL      Albumin 3 8 g/dL      Total Bilirubin 0 91 mg/dL      eGFR 65 ml/min/1 73sq m     Narrative:      National Kidney Disease Foundation guidelines for Chronic Kidney Disease (CKD):     Stage 1 with normal or high GFR (GFR > 90 mL/min/1 73 square meters)    Stage 2 Mild CKD (GFR = 60-89 mL/min/1 73 square meters)    Stage 3A Moderate CKD (GFR = 45-59 mL/min/1 73 square meters)    Stage 3B Moderate CKD (GFR = 30-44 mL/min/1 73 square meters)    Stage 4 Severe CKD (GFR = 15-29 mL/min/1 73 square meters)    Stage 5 End Stage CKD (GFR <15 mL/min/1 73 square meters)  Note: GFR calculation is accurate only with a steady state creatinine    CBC and differential [[id number]]  (Abnormal) Collected: 07/08/22 0800    Lab Status: Final result Specimen: Blood from Arm, Right Updated: 07/08/22 0808     WBC 8 72 Thousand/uL      RBC 3 74 Million/uL      Hemoglobin 11 6 g/dL      Hematocrit 35 8 %      MCV 96 fL      MCH 31 0 pg      MCHC 32 4 g/dL      RDW 13 8 %      MPV 10 8 fL      Platelets 519 Thousands/uL      nRBC 0 /100 WBCs      Neutrophils Relative 66 %      Immat GRANS % 1 %      Lymphocytes Relative 21 %      Monocytes Relative 9 %      Eosinophils Relative 2 %      Basophils Relative 1 %      Neutrophils Absolute 5 93 Thousands/µL      Immature Grans Absolute 0 05 Thousand/uL      Lymphocytes Absolute 1 79 Thousands/µL      Monocytes Absolute 0 75 Thousand/µL      Eosinophils Absolute 0 14 Thousand/µL      Basophils Absolute 0 06 Thousands/µL     UA (URINE) with reflex to Scope [[id number]]     Lab Status: No result Specimen: Urine                  XR chest 1 view portable   Final Result by Lynsey Lawton MD (07/08 9021)      Pulmonary edema                    Workstation performed: [id number]               Procedures  ECG 12 Lead Documentation Only    Date/Time: 7/8/2022 4:00 PM  Performed by: Otf Hutchison MD  Authorized by: Otf Hutchison MD     Patient location:  ED  Previous ECG:     Previous ECG: Unavailable    Comparison to cardiac monitor: No    Interpretation:     Interpretation: abnormal    Quality:     Tracing quality:  Limited by artifact  Rate:     ECG rate:  61    ECG rate assessment: normal    Rhythm:     Rhythm: sinus rhythm    Ectopy:     Ectopy: none    QRS:     QRS axis:  Normal    QRS intervals:  Normal  Conduction:     Conduction: abnormal      Abnormal conduction: complete LBBB    ST segments:     ST segments:  Normal  T waves:     T waves: normal            ED Course                                       MDM  Number of Diagnoses or Management Options  Acute on chronic diastolic congestive heart failure ([address] ): new and requires workup  Hypoxia: new and requires workup  Diagnosis management comments: Nathan Miles comes to the emergency department after acute exacerbation of respiratory distress and hypoxia in the setting of a past medical history of CHF  Based off of her initial evaluation and report via EMS, patient was initiated on BiPAP with improvement in her oxygenation and clinical stability  Initial laboratory studies were conducted:  BMP was noted to be elevated  Troponin was noted to be up trending during evaluation in the emergency department  CBC and CMP were unremarkable  Wt Readings from Last 3 Encounters:  05/03/22 : 52 2 kg (115 lb)  04/27/22 : 54 kg (119 lb)  02/24/22 : 54 kg (119 lb)    Chest x-ray was was noted to have increased opacities consistent with pulmonary effusion  ECG was documented above with no acute ischemic pathology appreciated  Patient was also denying any chest pain with regards to her symptoms  Patient did endorse that she was experiencing moderate improvement in her symptoms after administration of sublingual nitro  After trialing the patient on BiPAP for approximately 1 5 hours, patient was transition to 6 L nasal cannula and was maintaining adequate oxygenation in the emergency department    Patient was also administered a single dosage of Lasix for initiation of diuresis  This case was discussed with the inpatient team who also noted that the patient has a past medical history of aortic stenosis  In the setting of her cardiac pathologies and acute exacerbation of pulmonary effusions, patient was deemed appropriate for admission to the hospital for continued medical management of her respiratory distress and continue diuresis  This plan was discussed with both family and patient at the bedside who expressed understanding with this plan  Disposition:  Inpatient admission for continued evaluation of pulmonary effusion and cardiac workup               Amount and/or Complexity of Data Reviewed  Clinical lab tests: ordered and reviewed  Tests in the radiology section of CPT®: ordered and reviewed  Obtain history from someone other than the patient: yes  Review and summarize past medical records: yes  Discuss the patient with other providers: yes  Independent visualization of images, tracings, or specimens: yes    Risk of Complications, Morbidity, and/or Mortality  Presenting problems: moderate  Diagnostic procedures: moderate  Management options: moderate    Patient Progress  Patient progress: stable      Disposition  Final diagnoses:   Acute on chronic diastolic congestive [facility] failure ([facility] )   Hypoxia     Time reflects when diagnosis was documented in both MDM as applicable and the Disposition within this note     Time User Action Codes Description Comment    7/8/2022 10:36 AM Annika Cortes Add [I50 33] Acute on chronic diastolic congestive heart failure ([facility] )     7/8/2022 10:36 AM Annika Cortes Add [R09 02] Hypoxia     7/8/2022 11:53 AM Augie Heller Add [I50 1] Pulmonary edema cardiac cause ([facility] )     7/8/2022 11:53 AM Elza Heller Add [I35 0] Severe aortic stenosis     7/8/2022 11:59 AM Chely Heller Add [J96 01] Acute respiratory failure with hypoxia ([facility] )     7/8/2022 11:59 AM Chely Heller Add [E11 9] Type 2 diabetes mellitus without complication, without long-term current use of insulin [facility])       ED Disposition     ED Disposition   Admit    Condition   Stable    Date/Time   Fri Jul 8, 2022 10:36 AM    Comment   Case was discussed with [facility] provider and the patient's admission status was agreed to be Admission Status: inpatient status to the service of Dr Helena Joe Consuella Dakins     Follow up With Specialties Details Why Lisa Santana [address], [address] Cardiology Go on 7/22/2022 at 1:00 pm, please arrive at 12:45 pm [address] [address]  [phone]            Current Discharge Medication List      CONTINUE these medications which have NOT CHANGED    Details   ALPRAZolam (XANAX) 0 25 mg tablet TAKE ONE TABLET BY MOUTH EVERY DAY AS NEEDED  Qty: 30 tablet, Refills: 5    Associated Diagnoses: Anxiety      amLODIPine (NORVASC) 2 5 mg tablet TAKE ONE TABLET BY MOUTH EVERY MORNING AND ONE TABLET BY MOUTH EVERY EVENING  Qty: 180 tablet, Refills: 0    Associated Diagnoses: Essential hypertension      atorvastatin (LIPITOR) 40 mg tablet TAKE ONE TABLET BY MOUTH EVERY DAY  Qty: 30 tablet, Refills: 0    Associated Diagnoses: Type 2 diabetes mellitus without complication, without long-term current use of insulin ([facility] );  Essential hypertension      Blood Glucose Monitoring Suppl (OneTouch Verio Reflect) w/Device KIT uad once daily  Qty: 1 kit, Refills: 0    Associated Diagnoses: Type 2 diabetes mellitus without complication, without long-term current use of insulin ([facility])      cholecalciferol (VITAMIN D3) 1,000 units tablet Take 1,000 Units by mouth daily      fluocinolone acetonide (DERMOTIC) 0 01 % otic oil Administer 5 drops into both ears 2 (two) times a day as needed (itching)  Qty: 20 mL, Refills: 3    Associated Diagnoses: Otalgia of right ear      glucose blood (OneTouch Verio) test strip Use as instructed once daily  Qty: 100 strip, Refills: 11    Associated Diagnoses: Type 2 diabetes mellitus without complication, without long-term current use of insulin ([facility])      Lancets (freestyle) lancets TEST BLOOD SUGAR ONCE IN THE MORNING  Qty: 100 each, Refills: 11    Associated Diagnoses: Type 2 diabetes mellitus without complication, without long-term current use of insulin ([facility])      levothyroxine 25 mcg tablet TAKE 2 TABLETS BY MOUTH daily other than M and W which you take 1 daily  Qty: 180 tablet, Refills: 1    Associated Diagnoses: Other specified hypothyroidism      lisinopril (ZESTRIL) 20 mg tablet TAKE ONE TABLET BY MOUTH EVERY DAY  Qty: 90 tablet, Refills: 0    Associated Diagnoses: Essential hypertension      metFORMIN (GLUCOPHAGE-XR) 500 mg 24 hr tablet TAKE 2 TABLETS BY MOUTH TWICE A DAY WITH MEALS  Qty: 360 tablet, Refills: 0    Associated Diagnoses: Type 2 diabetes mellitus without complication, without long-term current use of insulin ([facility])      nadolol (CORGARD) 40 mg tablet TAKE ONE TABLET BY MOUTH EVERY DAY  Qty: 90 tablet, Refills: 0    Associated Diagnoses: Essential hypertension      sertraline (Zoloft) 25 mg tablet Take 1 tablet (25 mg total) by mouth in the morning  Qty: 30 tablet, Refills: 5    Associated Diagnoses: Dementia without behavioral disturbance, unspecified dementia type ([facility] );  Anxiety      vitamin B-12 (VITAMIN B-12) 1,000 mcg tablet Take by mouth daily      betamethasone valerate (VALISONE) 0 1 % cream Apply topically 2 (two) times a day  Qty: 30 g, Refills: 1    Associated Diagnoses: Otalgia of right ear      donepezil (Aricept) 10 mg tablet Take 1 tablet (10 mg total) by mouth daily at bedtime  Qty: 90 tablet, Refills: 1    Associated Diagnoses: Dementia without behavioral disturbance, unspecified dementia type ([facility])      neomycin-polymyxin-hydrocortisone (CORTISPORIN) 0 35%-10,000 units/mL-1% otic suspension Administer 4 drops to the right ear 2 (two) times a day for 10 days  Qty: 10 mL, Refills: 0    Associated Diagnoses: Otalgia of right ear No discharge procedures on file  PDMP Review       Value Time User    PDMP Reviewed  Yes 12/7/2020  8:39 AM Jorge Reyes           ED Provider  Attending physically available and evaluated Herman Fajardo I managed the patient along with the ED Attending      Electronically Signed by         Kristi Schaffer MD  07/08/22 0107

## 2022-07-09 PROBLEM — I35.9 AORTIC VALVULAR DISEASE: Status: ACTIVE | Noted: 2022-07-09

## 2022-07-09 PROBLEM — I50.30 HF (HEART FAILURE), DIASTOLIC (HCC): Status: ACTIVE | Noted: 2022-01-01

## 2022-07-09 NOTE — ASSESSMENT & PLAN NOTE
Prior MRI in patient's 35s or 40s unclear on exact time frame  Plan  · Continue beta-blocker and statin

## 2022-07-09 NOTE — PROGRESS NOTES
Cardiology Progress Note - Lilian Sessions 80 y o  female MRN: [id number]    Unit/Bed#: S -01 Encounter: [id number]      Assessment/Recommendations:  1  Acute diastolic heart failure:  Likely secondary to valvular heart disease  With moderate aortic stenosis and moderate to severe mitral regurgitation  Will change to p o  Lasix today, strict I&Os and daily weights  Making progress as far as volume status clinically  Would check ambulatory pulse ox today as well to assess oxygen requirements and symptoms  2  Valvular heart disease: With severe AS and moderate to severe mitral regurgitation  Continue on volume management as per above  Eventual discussions regarding AVR/MVR as an outpatient  3  Acute hypoxic respiratory failure:  Secondary to 1 , continue on oxygen support and diuresis  4  Prior remote MI:  At the age of 27, unclear details  No prior reports of stenting or PCI  No ischemic symptoms  Continued on beta-blocker and statin  5  Hypertension:  Relatively at goal today  Continue current regimen of amlodipine, lisinopril, beta-blocker  6  Hyperlipidemia:  Continued on statin  7  Type 2 diabetes mellitus:  As per primary team   8  Hypothyroidism:  As per primary team     Subjective:   Patient seen and examined  No significant events overnight   stable dyspnea on exertion, ; pertinent negatives - chest pain, chest pressure/discomfort, lower extremity edema and palpitations  Objective:     Vitals: Blood pressure 150/67, pulse 59, temperature 97 9 °F (36 6 °C), temperature source Oral, resp  rate 19, height 5' (1 524 m), weight 50 4 kg (111 lb 1 8 oz), SpO2 99 %  , Body mass index is 21 7 kg/m² ,   Orthostatic Blood Pressures    Flowsheet Row Most Recent Value   Blood Pressure 150/67 filed at 07/09/2022 0700   Patient Position - Orthostatic VS Lying filed at 07/09/2022 0700            Intake/Output Summary (Last 24 hours) at 7/9/2022 0841  Last data filed at 7/8/2022 1801  Gross per 24 hour   Intake 300 ml   Output 1630 ml   Net -1330 ml       TELE:  Brief episodes of PAT    Physical Exam:    GEN: Angy Cabral appears well, alert and oriented x 3, pleasant and cooperative   HEENT: pupils equal, round, and reactive to light; extraocular muscles intact  NECK: supple, no carotid bruits   HEART: regular rhythm, normal S1 absent S2, holosystolic murmur, no clicks, gallops or rubs   LUNGS:  Mildly diminished breath sounds bilaterally  ABDOMEN: normal bowel sounds, soft, no tenderness, no distention  EXTREMITIES: peripheral pulses normal; no clubbing, cyanosis, or edema  NEURO: no focal findings   SKIN: normal without suspicious lesions on exposed skin    Medications:      Current Facility-Administered Medications:     acetaminophen (TYLENOL) tablet 650 mg, 650 mg, Oral, Q4H PRN, BLANCA Acuña    ALPRAZolam Marvie See) tablet 0 25 mg, 0 25 mg, Oral, Daily PRN, BLANCA Acuña    amLODIPine (NORVASC) tablet 2 5 mg, 2 5 mg, Oral, Daily, BLANCA Acuña, 2 5 mg at 07/09/22 0815    atorvastatin (LIPITOR) tablet 40 mg, 40 mg, Oral, Daily, BLANCA Acuña, 40 mg at 07/09/22 1808    cyanocobalamin (VITAMIN B-12) tablet 1,000 mcg, 1,000 mcg, Oral, Daily, BLANCA Acuña, 1,000 mcg at 07/09/22 5339    docusate sodium (COLACE) capsule 100 mg, 100 mg, Oral, BID, BLANCA Acuña, 100 mg at 07/09/22 8758    enoxaparin (LOVENOX) subcutaneous injection 30 mg, 30 mg, Subcutaneous, Daily, BLANCA Acuña, 30 mg at 07/09/22 9260    furosemide (LASIX) injection 40 mg, 40 mg, Intravenous, Daily, BLANCA Acuña, 40 mg at 07/09/22 1130    hydrALAZINE (APRESOLINE) injection 5 mg, 5 mg, Intravenous, Q6H PRN, BLANCA Acuña    insulin lispro (HumaLOG) 100 units/mL subcutaneous injection 1-5 Units, 1-5 Units, Subcutaneous, TID AC, 1 Units at 07/08/22 1653 **AND** Fingerstick Glucose (POCT), , , INDU HERNANDEZ, BLANCA Acuña    insulin lispro (HumaLOG) 100 units/mL subcutaneous injection 1-5 Units, 1-5 Units, Subcutaneous, HS, Monserrat Breaker, CRNP, 1 Units at 07/08/22 2207    levothyroxine tablet 25 mcg, 25 mcg, Oral, Early Morning, Monserrat Breaker, CRNP, 25 mcg at 07/09/22 0546    lisinopril (ZESTRIL) tablet 20 mg, 20 mg, Oral, Daily, Monserrat Breaker, CRNP, 20 mg at 07/09/22 3862    [facility]ron [facility]) injection 4 mg, 4 mg, Intravenous, Q6H PRN, Monserrat Breaker, CRNP    potassium chloride (K-DUR,KLOR-CON) CR tablet 20 mEq, 20 mEq, Oral, Daily, Monserrat Breaker, CRNP, 20 mEq at 07/09/22 1016    sertraline (ZOLOFT) tablet 25 mg, 25 mg, Oral, Daily, Monserrat Breaker, CRNP, 25 mg at 07/09/22 2178     Labs & Results:        Results from last 7 days   Lab Units 07/08/22  1252 07/08/22  0800   WBC Thousand/uL  --  8 72   HEMOGLOBIN g/dL  --  11 6   HEMATOCRIT %  --  35 8   PLATELETS Thousands/uL 326 307         Results from last 7 days   Lab Units 07/09/22  0552 07/08/22  0800   POTASSIUM mmol/L 3 9 4 7   CHLORIDE mmol/L 96 97   CO2 mmol/L 28 26   BUN mg/dL 23 16   CREATININE mg/dL 0 83 0 79   CALCIUM mg/dL 9 3 9 0   ALK PHOS U/L  --  85   ALT U/L  --  11   AST U/L  --  21               Echo: personally reviewed - normal LV size and function  Normal wall motion  Grade 2 diastolic dysfunction  Left atrial enlargement  Critical aortic stenosis  Moderate to severe mitral regurgitation  Mild tricuspid regurgitation  Moderate pulmonary hypertension      EKG personally reviewed by Morelia Rodriguez MD

## 2022-07-09 NOTE — ASSESSMENT & PLAN NOTE
Subjectively well controlled without any significant decline in mentation and cognition today's exam  Continue home medications

## 2022-07-09 NOTE — ASSESSMENT & PLAN NOTE
Lab Results   Component Value Date    HGBA1C 6 3 05/03/2022       Recent Labs     07/08/22  1249 07/08/22  1556 07/08/22  2112 07/09/22  0744   POCGLU 148* 192* 183* 125       Blood Sugar Average: Last 72 hrs:  (P) 162

## 2022-07-09 NOTE — ASSESSMENT & PLAN NOTE
Echo shows severe aortic stenosis, severely calcified aortic valve, and severely reduced leaflet motility an ejection fraction 55%  Moderate to severe mitral regurgitation also noted echo done 7/8/22  Likely the cause patient's acute diastolic heart failure  Chest x-ray showed bilateral pleural effusion and diffuse pulmonary edema  Echo in March 2021 EF was 60%  Orthopnea, worsening shortness of breath, orthopnea, pulmonary hypertension  Currently on 2 L oxygen  Plan  · Trend I/O  · Lasix 40 mg p o   Daily  · Ambulatory pulse ox daily  · Discuss consideration of AVR/MVR with patient

## 2022-07-09 NOTE — ASSESSMENT & PLAN NOTE
Wt Readings from Last 3 Encounters:   07/09/22 50 4 kg (111 lb 1 8 oz)   05/03/22 52 2 kg (115 lb)   04/27/22 54 kg (119 lb)     Likely secondary to aortic valve disease

## 2022-07-09 NOTE — ASSESSMENT & PLAN NOTE
Likely secondary to pulmonary edema  Patient saw significant improvement after BiPAP and is currently saturating at 94% on 2 L oxygen  Plan  · Continue 2 L of oxygen on nasal cannula  · Monitor oxygen saturation

## 2022-07-09 NOTE — RESPIRATORY THERAPY NOTE
Home Oxygen Qualifying Test     Patient name: Jumana Abdi        : 1929   Date of Test:  2022  Diagnosis:    Home Oxygen Test:      Baseline SPO2 on Room Air at rest 95 %     SPO2 during exertion on Room Air 91  %    Test performed during exertion activity  []  Supplemental Home Oxygen is indicated  [x]  Client does not qualify for home oxygen  Respiratory Additional Notes-  Pt seemed somewhat unstable with feet, stated she has not been walking for awhile  She was able to walk in the room by herself with close watch  Lowest spO2 while walking on RA was 91%, was able to recover to 93% with rest  Pt educated to take a break when GRAHAM Eisenberg, RT

## 2022-07-09 NOTE — ASSESSMENT & PLAN NOTE
Pulmonary arterial pressure between 45 and 55 mm likely secondary to valve disease  Echo showed: mild dilation of left atrium & pulmonary arterial systolic pressure moderately increased

## 2022-07-09 NOTE — PROGRESS NOTES
[facility]  Progress Note - [address] [facility] 9/17/1929, 80 y o  female MRN: [id number]  Unit/Bed#: S -01 Encounter: [id number]  Primary Care Provider: Carlos Pollock MD   Date and time admitted to hospital: 7/8/2022  7:23 AM    Aortic valvular disease  Assessment & Plan  Echo shows severe aortic stenosis, severely calcified aortic valve, and severely reduced leaflet motility an ejection fraction 55%  Moderate to severe mitral regurgitation also noted echo done 7/8/22  Likely the cause patient's acute diastolic heart failure  Chest x-ray showed bilateral pleural effusion and diffuse pulmonary edema  Echo in March 2021 EF was 60%  Orthopnea, worsening shortness of breath, orthopnea, pulmonary hypertension  Currently on 2 L oxygen  Plan  · Trend I/O  · Lasix 40 mg p o   Daily  · Ambulatory pulse ox daily  · Discuss consideration of AVR/MVR with patient    Acute respiratory failure with hypoxia (HCC)  Assessment & Plan  Likely secondary to pulmonary edema  Patient saw significant improvement after BiPAP and is currently saturating at 94% on 2 L oxygen  Plan  · Continue 2 L of oxygen on nasal cannula  · Monitor oxygen saturation    Dementia without behavioral disturbance, unspecified dementia type ([facility] [address] )  Assessment & Plan  Subjectively well controlled without any significant decline in mentation and cognition today's exam  Continue home medications    Pulmonary hypertension ([facility] [address] )  Assessment & Plan  Pulmonary arterial pressure between 45 and 55 mm likely secondary to valve disease  Echo showed: mild dilation of left atrium & pulmonary arterial systolic pressure moderately increased    Coronary artery disease  Assessment & Plan  Prior MRI in patient's 35s or 40s unclear on exact time frame  Plan  · Continue beta-blocker and statin    Diabetes [facility])  Assessment & Plan  Lab Results   Component Value Date    HGBA1C 6 3 05/03/2022       Recent Labs     07/08/22  1249 07/08/22  1556 222 22  0744   POCGLU 148* 192* 183* 125       Blood Sugar Average: Last 72 hrs:  (P) 162    Hypertension  Assessment & Plan  Blood pressure well controlled with amlodipine, lisinopril, beta-blocker    * Pulmonary edema cardiac cause [facility])  Assessment & Plan  Wt Readings from Last 3 Encounters:   22 50 4 kg (111 lb 1 8 oz)   22 52 2 kg (115 lb)   22 54 kg (119 lb)     Likely secondary to aortic valve disease              VTE Pharmacologic Prophylaxis: VTE Score: 3 Moderate Risk (Score 3-4) - Pharmacological DVT Prophylaxis Ordered: enoxaparin (Lovenox)  Patient Centered Rounds: I performed bedside rounds with nursing staff today  Discussions with Specialists or Other Care Team Provider:  Attending physician    Education and Discussions with Family / Patient: Patient declined call to   Current Length of Stay: 1 day(s)  Current Patient Status: Inpatient   Discharge Plan: Anticipate discharge in 24-48 hrs to home  Code Status: Level 3 - DNAR and DNI    Subjective:   61-year-old female with past medical history of severe aortic stenosis and pulmonary edema presented with shortness of breath difficulty breathing  States today shortness of breath is improved with less orthopnea and has not noticed any limb edema  Patient requiring less oxygen through nasal cannula  Patient not interested in invasive procedures and would like to be managed conservatively  Currently clinically stable    Objective:     Vitals:   Temp (24hrs), Av 9 °F (36 6 °C), Min:97 5 °F (36 4 °C), Max:98 7 °F (37 1 °C)    Temp:  [97 5 °F (36 4 °C)-98 7 °F (37 1 °C)] 97 9 °F (36 6 °C)  HR:  [54-74] 59  Resp:  [19-23] 19  BP: (136-180)/(65-84) 150/67  SpO2:  [91 %-100 %] 99 %  Body mass index is 21 7 kg/m²  Input and Output Summary (last 24 hours):      Intake/Output Summary (Last 24 hours) at 2022 1137  Last data filed at 2022 1011  Gross per 24 hour   Intake 420 ml   Output 1880 ml   Net -1460 ml       Physical Exam:   Physical Exam  Vitals and nursing note reviewed  Constitutional:       General: She is not in acute distress  Appearance: Normal appearance  She is normal weight  She is not ill-appearing, toxic-appearing or diaphoretic  HENT:      Head: Normocephalic  Cardiovascular:      Rate and Rhythm: Normal rate and regular rhythm  Pulses: Normal pulses  Heart sounds: Normal heart sounds  Pulmonary:      Effort: Pulmonary effort is normal       Breath sounds: Rhonchi and rales present  Abdominal:      General: Abdomen is flat  Bowel sounds are normal       Palpations: Abdomen is soft  Skin:     General: Skin is warm  Neurological:      General: No focal deficit present  Mental Status: She is alert  Psychiatric:         Mood and Affect: Mood normal          Behavior: Behavior normal          Thought Content:  Thought content normal          Additional Data:     Labs:  Results from last 7 days   Lab Units 07/08/22  1252 07/08/22  0800   WBC Thousand/uL  --  8 72   HEMOGLOBIN g/dL  --  11 6   HEMATOCRIT %  --  35 8   PLATELETS Thousands/uL 326 307   NEUTROS PCT %  --  66   LYMPHS PCT %  --  21   MONOS PCT %  --  9   EOS PCT %  --  2     Results from last 7 days   Lab Units 07/09/22  0552 07/08/22  0800   SODIUM mmol/L 135 130*   POTASSIUM mmol/L 3 9 4 7   CHLORIDE mmol/L 96 97   CO2 mmol/L 28 26   BUN mg/dL 23 16   CREATININE mg/dL 0 83 0 79   ANION GAP mmol/L 11 7   CALCIUM mg/dL 9 3 9 0   ALBUMIN g/dL  --  3 8   TOTAL BILIRUBIN mg/dL  --  0 91   ALK PHOS U/L  --  85   ALT U/L  --  11   AST U/L  --  21   GLUCOSE RANDOM mg/dL 126 181*         Results from last 7 days   Lab Units 07/09/22  0744 07/08/22  2112 07/08/22  1556 07/08/22  1249   POC GLUCOSE mg/dl 125 183* 192* 148*               Lines/Drains:  Invasive Devices  Report    Peripheral Intravenous Line  Duration           Peripheral IV 07/08/22 Right Antecubital 1 day          Drain  Duration External Urinary Catheter <1 day                  Telemetry:  Telemetry Orders (From admission, onward)             48 Hour Telemetry Monitoring  Continuous x 48 hours        References:    Telemetry Guidelines   Question:  Reason for 48 Hour Telemetry  Answer:  Acute Decompensated CHF (continuous diuretic infusion or total diuretic dose > 200 mg daily, associated electrolyte derangement, ionotropic drip, history of ventricular arrhythmia, or new EF <35%)                 Telemetry Reviewed: Normal Sinus Rhythm  Indication for Continued Telemetry Use: Acute CHF on >200 mg lasix/day or equivalent dose or with new reduced EF                Imaging: Reviewed radiology reports from this admission including: ECHO    Recent Cultures (last 7 days):         Last 24 Hours Medication List:   Current Facility-Administered Medications   Medication Dose Route Frequency Provider Last Rate    acetaminophen  650 mg Oral Q4H PRN Nuala Rome, CRNP      ALPRAZolam  0 25 mg Oral Daily PRN Nuala Wareham, CRNP      amLODIPine  2 5 mg Oral Daily Nuala Wareham, CRNP      atorvastatin  40 mg Oral Daily Nuala Rome, [address]      vitamin B-12  1,000 mcg Oral Daily Nuala Rome, CRNP      docusate sodium  100 mg Oral BID Nuala Rome, CRNP      enoxaparin  30 mg Subcutaneous Daily Nuala Wareham, CRNP      [START ON 7/10/2022] furosemide  40 mg Oral Daily River Kapoor MD      hydrALAZINE  5 mg Intravenous Q6H PRN Nuala Wareham, CRNP      insulin lispro  1-5 Units Subcutaneous TID [facility] Nuala Rome, CRNP      insulin lispro  1-5 Units Subcutaneous HS Nuala Wareham, CRNP      levothyroxine  25 mcg Oral Early Morning Nuala Wareham, CRNP      lisinopril  20 mg Oral Daily Nuala Wareham, CRNP      ondansetron  4 mg Intravenous Q6H PRN Nuala Wareham, CRNP      potassium chloride  20 mEq Oral Daily Nuala Wareham, CRNP      sertraline  25 mg Oral Daily Nuala Rome, CRNP          Today, Patient Was Seen By: Víctor Schulte MD    **Please Note: This note may have been constructed using a voice recognition system  **

## 2022-07-09 NOTE — UTILIZATION REVIEW
Initial Clinical Review    Admission: Date/Time/Statement:   Admission Orders (From admission, onward)     Ordered        07/08/22 1037  Inpatient Admission  Once                      Orders Placed This Encounter   Procedures    Inpatient Admission     Standing Status:   Standing     Number of Occurrences:   1     Order Specific Question:   Level of Care     Answer:   Level 2 Stepdown / HOT [14]     Order Specific Question:   Estimated length of stay     Answer:   More than 2 Midnights     Order Specific Question:   Certification     Answer:   I certify that inpatient services are medically necessary for this patient for a duration of greater than two midnights  See H&P and MD Progress Notes for additional information about the patient's course of treatment  ED Arrival Information     Expected   -    Arrival   7/8/2022 07:23    Acuity   Emergent            Means of arrival   Ambulance    Escorted by   [facility]    Admission type   Emergency            Arrival complaint   SOB           Chief Complaint   Patient presents with    Shortness of Breath       Initial Presentation: 80 y o  female with hx Alzheimers dementia, cognitive decline, moderate to severe aortic stenosis, pulmonary hypertension, MI, diabetes mellitus type 2,HTN, hypothyroidism who presents to Ed from home via EMS with worsening shortness of breath over the past month much worse over the past 2 days  On exam, pt hypoxic in 70's and place on BiPAP with improved sats   Lungs bibasilar rales  Labs   CXR shows pulmonary edema  No prior hx CHF, not on diuretics at home   Pt received IV lasix in ED  Pt able mari be taken off Bi PAP , on 5 L Nc with sats 88-90 % as pt diuersed  Pt admitted as inpatient to [address] with pulmonary edema with cardiac cause, acute hypoxic resp failure   Plan - IV Lasix BID, I/O, daily wt, telemetry, cardiology consult, supplemental o2   Cardiology consult-Newly diagnosed CHF, likely from valvular heart disease  Agree with continuing on IV Lasix at current dose  Strict I&Os and daily weights recommended  Check daily creatinine to assess renal function in the setting  Eventual outpatient evaluation for consideration for AVR  Continue beta-blocker therapy  Obtain TTE   I/O = -700 ml after IV lasix   No significant JVD or peripheral edema  Currently on 6 L of supplemental O2  Continue telemetry  Date: 7/9   Day 2:   Cardiology-IV Lasix changed to po today  Pt STEVENSON  Lung sounds decreased bilaterally, has holosystolic murmur   I/O = 1330 ml last 24 hrs   Weight down   Telemetry showed brief episode PAT Echo shows G2 DD, critical AS, mod to severe MR, moderate pulm HTN   Date/Time Weight Weight Method   07/09/22 0203 50 4 kg (111 lb 1 8 oz) Bed scale   07/08/22 1515 52 2 kg (115 lb) --     Medicine- pt able to be weaned off O2, no significant edema   Encourage out of bed to chair and ambulation  Check ambulatory pulse ox     PT/OT eval         ED Triage Vitals   Temperature Pulse Respirations Blood Pressure SpO2   07/08/22 0757 07/08/22 0727 07/08/22 0727 07/08/22 0727 07/08/22 0727   (!) 96 8 °F (36 °C) 84 (!) 26 124/65 (!) 84 %      Temp Source Heart Rate Source Patient Position - Orthostatic VS BP Location FiO2 (%)   07/08/22 0757 07/08/22 0727 07/08/22 0727 07/08/22 0727 07/08/22 0732   Rectal Monitor Sitting Right arm 50      Pain Score       07/08/22 2000       No Pain          Wt Readings from Last 1 Encounters:   07/09/22 50 4 kg (111 lb 1 8 oz)     Additional Vital Signs:   Date/Time Temp Pulse Resp BP MAP (mmHg) SpO2 FiO2 (%) Calculated FIO2 (%) - Nasal Cannula Nasal Cannula O2 Flow Rate (L/min) O2 Device O2 Interface Device   07/09/22 1100 97 7 °F (36 5 °C) 58 24 Abnormal  133/64 92 92 % -- -- -- None (Room air) --   07/09/22 0700 97 9 °F (36 6 °C) 59 19 150/67 97 99 % -- 28 2 L/min Nasal cannula --   07/09/22 0203 98 °F (36 7 °C) 54 Abnormal  20 136/65 93 94 % -- -- -- -- --   07/08/22 2300 98 7 °F (37 1 °C) 59 20 166/70 101 95 % -- -- -- Nasal cannula --   07/08/22 1900 97 5 °F (36 4 °C) 57 20 144/66 95 97 % -- -- -- Nasal cannula --   07/08/22 1700 -- -- -- -- -- -- -- 28 2 L/min -- --   07/08/22 1548 97 7 °F (36 5 °C) 56 20 156/72 104 100 % -- -- -- -- --   07/08/22 1515 -- 65 -- 180/84 Abnormal  -- -- -- -- -- -- --   07/08/22 1327 -- 59 -- 170/74 -- -- -- -- -- -- --   07/08/22 1200 -- 63 23 Abnormal  179/82 Abnormal  118 97 % -- -- -- -- --   07/08/22 1140 97 8 °F (36 6 °C) 74 22 180/84 Abnormal  121 91 % -- -- -- -- --   07/08/22 1100 97 7 °F (36 5 °C) 62 20 154/69 99 96 % -- 44 6 L/min Nasal cannula --   07/08/22 1009 -- -- -- -- -- 92 % -- 44 6 L/min Nasal cannula --   07/08/22 1000 -- 68 20 167/72 104 93 % -- 44 6 L/min Nasal cannula --   07/08/22 0900 -- 52 Abnormal  20 123/56 81 99 % -- -- -- BiPAP --   07/08/22 0800 -- -- -- -- -- -- -- -- -- BiPAP --   07/08/22 0757 96 8 °F (36 °C) Abnormal  -- -- -- -- -- -- -- -- -- --   07/08/22 0745 -- 62 22 132/58 84 93 % -- -- -- BiPAP --   07/08/22 0732 -- -- -- -- -- 96 % 50 -- -- BiPAP Full face mask   07/08/22 0728 -- -- -- -- -- 89 % Abnormal  -- -- -- -- --       Pertinent Labs/Diagnostic Test Results:   7/8 am ECG-Normal sinus rhythm  Left bundle branch block  7/8 Echo  Left Ventricle: Left ventricular cavity size is normal  Wall thickness is mildly increased  The left ventricular ejection fraction is 55%  Systolic function is normal  Wall motion is normal  Diastolic function is moderately abnormal, consistent with grade II (pseudonormal) relaxation    Left Atrium: The atrium is mildly dilated    Aortic Valve: The aortic valve is trileaflet  The leaflets are not thickened  The leaflets are severely calcified  There is severely reduced mobility  There is critical stenosis  The aortic valve velocity is increased due to stenosis    Mitral Valve: There is moderate calcification  There is severe annular calcification  There is moderate to severe regurgitation    Tricuspid Valve: There is mild regurgitation    Pulmonary Artery: The estimated pulmonary artery systolic pressure is 75 5 mmHg  The pulmonary artery systolic pressure is moderately increased  XR chest 1 view portable   Final Result by Nanda Younger MD (07/08 4353)      Pulmonary edema                    Workstation performed: [id number]           Results from last 7 days   Lab Units 07/08/22  0742   SARS-COV-2  Negative     Results from last 7 days   Lab Units 07/08/22  1252 07/08/22  0800   WBC Thousand/uL  --  8 72   HEMOGLOBIN g/dL  --  11 6   HEMATOCRIT %  --  35 8   PLATELETS Thousands/uL 326 307   NEUTROS ABS Thousands/µL  --  5 93         Results from last 7 days   Lab Units 07/09/22  0552 07/08/22  0800   SODIUM mmol/L 135 130*   POTASSIUM mmol/L 3 9 4 7   CHLORIDE mmol/L 96 97   CO2 mmol/L 28 26   ANION GAP mmol/L 11 7   BUN mg/dL 23 16   CREATININE mg/dL 0 83 0 79   EGFR ml/min/1 73sq m 61 65   CALCIUM mg/dL 9 3 9 0     Results from last 7 days   Lab Units 07/08/22  0800   AST U/L 21   ALT U/L 11   ALK PHOS U/L 85   TOTAL PROTEIN g/dL 7 1   ALBUMIN g/dL 3 8   TOTAL BILIRUBIN mg/dL 0 91     Results from last 7 days   Lab Units 07/09/22  0744 07/08/22  2112 07/08/22  1556 07/08/22  1249   POC GLUCOSE mg/dl 125 183* 192* 148*     Results from last 7 days   Lab Units 07/09/22  0552 07/08/22  0800   GLUCOSE RANDOM mg/dL 126 181*               Results from last 7 days   Lab Units 07/08/22  1252 07/08/22  0956 07/08/22  0800   HS TNI 0HR ng/L  --   --  9   HS TNI 2HR ng/L  --  37  --    HSTNI D2 ng/L  --  28*  --    HS TNI 4HR ng/L 46  --   --    HSTNI D4 ng/L 37*  --   --            Results from last 7 days   Lab Units 07/08/22  0800   BNP pg/mL 343*               Results from last 7 days   Lab Units 07/08/22  1700   CLARITY UA  Clear   COLOR UA  Colorless   SPEC GRAV UA  1 010   PH UA  6 5   GLUCOSE UA mg/dl Negative   KETONES UA mg/dl Negative   BLOOD UA Negative   PROTEIN UA mg/dl Negative   NITRITE UA  Negative   BILIRUBIN UA  Negative   UROBILINOGEN UA E U /dl 0 2   LEUKOCYTES UA  Trace*   WBC UA /hpf 0-1   RBC UA /hpf None Seen   BACTERIA UA /hpf Occasional   EPITHELIAL CELLS WET PREP /hpf None Seen     Results from last 7 days   Lab Units 07/08/22  0742   INFLUENZA A PCR  Negative   INFLUENZA B PCR  Negative   RSV PCR  Negative                 ED Treatment:   Medication Administration from 07/08/2022 0723 to 07/08/2022 1137       Date/Time Order Dose Route Action     07/08/2022 0734 ipratropium-albuterol (FOR EMS ONLY) (DUO-NEB) 0 5-2 5 mg/3 mL inhalation solution 6 mL 0 mL Does not apply Given to EMS     07/08/2022 0734 methylPREDNISolone sodium succinate (FOR EMS ONLY) (Solu-MEDROL) 125 MG injection 125 mg 0 mg Does not apply Given to EMS     07/08/2022 0736 nitroglycerin (FOR EMS ONLY) (NITROSTAT) SL tablet 10 mg 0 mg Does not apply Given to EMS     07/08/2022 0957 furosemide (LASIX) injection 40 mg 40 mg Intravenous Given        Past Medical History:   Diagnosis Date    Anxiety     Coronary artery disease     as per stefani    Diabetes ([facility] )     as per [place]    Disease of thyroid gland     Heart attack ([facility] )     as per [place]    High cholesterol     as per [place]    HL (hearing loss)     Wears hearing aid, right    Hyperlipidemia     Hyperlipidemia     as per [place]    Hypertension     as per [place]     Present on Admission:   Pulmonary edema cardiac cause ([facility] )   Acute [facility] failure with hypoxia ([facility] )   Severe aortic stenosis   Pulmonary hypertension ([facility] )   Hypertension   Dementia without behavioral disturbance, unspecified dementia type ([facility] )   Diabetes ([facility] )   Coronary artery disease      Admitting Diagnosis: SOB (shortness of breath) [R06 02]  Hypoxia [R09 02]  Acute on chronic diastolic congestive heart failure ([facility] ) [I50 33]  Age/Sex: 80 y o  female  Admission Orders:  Scheduled Medications:  amLODIPine, 2 5 mg, Oral, Daily  atorvastatin, 40 mg, Oral, Daily  vitamin B-12, 1,000 mcg, Oral, Daily  docusate sodium, 100 mg, Oral, BID  enoxaparin, 30 mg, Subcutaneous, Daily  [START ON 7/10/2022] furosemide, 40 mg, Oral, Daily  insulin lispro, 1-5 Units, Subcutaneous, TID AC  insulin lispro, 1-5 Units, Subcutaneous, HS  levothyroxine, 25 mcg, Oral, Early Morning  lisinopril, 20 mg, Oral, Daily  potassium chloride, 20 mEq, Oral, Daily  sertraline, 25 mg, Oral, Daily    nadolol (CORGARD) tablet 40 mg  Dose: 40 mg  Freq: Daily Route: PO  Start: 07/08/22 1200 End: 07/09/22 9244  furosemide (LASIX) injection 40 mg  Dose: 40 mg  Freq: Daily Route: IV  Start: 07/08/22 1800 End: 07/09/22 0847  Continuous IV Infusions:     PRN Meds:  acetaminophen, 650 mg, Oral, Q4H PRN  ALPRAZolam, 0 25 mg, Oral, Daily PRN  hydrALAZINE, 5 mg, Intravenous, Q6H PRN  ondansetron, 4 mg, Intravenous, Q6H PRN    telemetry  I/O   daily wt   ambulatory pulse ox  Cardiac diet 1500 ml FR    IP CONSULT TO NUTRITION SERVICES  IP CONSULT TO CARDIOLOGY    Network Utilization Review Department  ATTENTION: Please call with any questions or concerns to [phone] and carefully listen to the prompts so that you are directed to the right person  All voicemails are confidential   [place] all requests for admission clinical reviews, approved or denied determinations and any other requests to dedicated fax number below belonging to the campus where the patient is receiving treatment   List of dedicated fax numbers for the Facilities:  [address] DENIALS (Administrative/Medical Necessity) [phone]   [address] [place] [address] (Maternity/NICU/Pediatrics) 261 [facility],[address] [facility] [place] 2000 [facility] [place] [address] Lisa Carballo [address] [facility] [place] [address] [phone]

## 2022-07-10 PROBLEM — J96.01 ACUTE RESPIRATORY FAILURE WITH HYPOXIA (HCC): Status: RESOLVED | Noted: 2022-01-01 | Resolved: 2022-01-01

## 2022-07-10 NOTE — ASSESSMENT & PLAN NOTE
Patient noted to have severe aortic stenosis with a valve size of 0 9 cm2  Was referred to see Cardiology as outpatient however she never made it  Also has additional pulmonary hypertension  We will have Cardiology evaluate  Discussed with daughter-they would prefer her to be treated conservatively  TTE on 07/08 showed EF of 50%, critical aortic stenosis, severe mitral regurgitation    Cardiology following

## 2022-07-10 NOTE — ASSESSMENT & PLAN NOTE
Blood pressure is stable    Continue amlodipine 2 5 mg daily, lisinopril    Nadolol was discontinued secondary to bradycardia · Baseline creatine 1 4-1 6   · Creatinine increased since admission; now 2  11    · Likely secondary to increased doses of Lasix for diuresis  · Continue to monitor BUN and creatinine  · Monitor I&O  · Continue to hold Lisinopril

## 2022-07-10 NOTE — PHYSICAL THERAPY NOTE
PHYSICAL THERAPY EVALUATION  NAME:  Eva Linn  DATE: 07/10/22    AGE:   80 y o    Mrn:   [id number]  ADMIT DX:  SOB (shortness of breath) [R06 02]  Hypoxia [R09 02]  Acute on chronic diastolic congestive heart failure (HCC) [I50 33]  Problem List:   Patient Active Problem List   Diagnosis    Hyperlipidemia    Hypertension    Asymptomatic bilateral carotid artery stenosis    Diabetes (HCC)    Other specified hypothyroidism    Coronary artery disease    HL (hearing loss)    Anxiety    Slow transit constipation    Age-related osteoporosis without current pathological fracture    Severe aortic stenosis    Pulmonary hypertension ([facility] )    Dementia without behavioral disturbance, unspecified dementia type ([facility] )    Pulmonary edema cardiac cause ([place] [address] )    Aortic valvular disease     Vitals:    07/09/22 2051 07/09/22 2124 07/09/22 2220 07/10/22 0715   BP:  (!) 182/90 (!) 176/77 150/75   BP Location:       Pulse:  72 64 67   Resp:    18   Temp:  97 8 °F (36 6 °C)     TempSrc:       SpO2: 95% 92% 92% 95%   Weight:       Height:           Length Of Stay: 2  Performed at least 2 patient identifiers during session: Name and Birthday  PHYSICAL THERAPY EVALUATION :      07/10/22 1240   PT Last Visit   PT Visit Date 07/10/22   Note Type   Note type Evaluation   Pain Assessment   Pain Assessment Tool 0-10   Pain Score No Pain   Restrictions/Precautions   Weight Bearing Precautions Per Order No   Other Precautions   (? cognitive vs [place])   Home Living   Type of Home House   Home Layout Two level  (1 LA NENA, patient does not use 2nd floor)   Home Equipment   (No DME used prior to admission, does have access to a rolling walker)   Prior Function   Level of Ray Independent with ADLs and functional mobility   Lives With Alone   Receives Help From Family  (Daughter reportedly lives close by)   ADL Assistance Independent   IADLs   (Recently gave up driving 5 months ago)   Falls in the last 6 months 0   General Family/Caregiver Present   (Two daughter's present)   Cognition   Arousal/Participation Alert   Orientation Level Oriented to person;Oriented to place   Following Commands Follows one step commands with increased time or repetition  (Inconsistent with MMT)   Subjective   Subjective Patient pleasant cooperative  Some inconsistencies with responses to informational interview  Some difficulty following MMT commands   RUE Assessment   RUE Assessment WFL   LUE Assessment   LUE Assessment WFL      Strength RLE   R Hip Flexion   (Tested to 3)   R Knee Extension 4/5   R Ankle Dorsiflexion   (Tested to 3 due to difficulty following instructions)   Strength LLE   L Hip Flexion   (Tested to 3)   L Knee Extension 4/5   L Ankle Dorsiflexion   (Tested to 3 due to difficulty following instructions)   Coordination   Movements are Fluid and Coordinated 0   Coordination and Movement Description Mild bradykinesia   Light Touch   RLE Light Touch Grossly intact   LLE Light Touch Grossly intact   Bed Mobility   Supine to Sit 6  Modified independent   Additional items Increased time required; Bedrails   Transfers   Sit to Stand 6  Modified independent   Additional items Increased time required;Armrests   Stand to Sit 6  Modified independent   Additional items Increased time required;Armrests   Ambulation/Elevation   Gait pattern Excessively slow; Forward Flexion   Gait Assistance 5  Supervision   Additional items Assist x 1   Assistive Device None   Distance 300'+100'; comfortable gait speed at 0 64 m/sec   Stair Management Assistance 5  Supervision   Additional items Assist x 1   Stair Management Technique Alternating pattern; Foreward  (One rail)   Number of Stairs 4   Ambulation/Elevation Additional Comments At end of gait trial, patient attempted to walk into the wrong room, redirected    Patient was able to find her way back with occasional redirection to "room 405"   Balance   Static Sitting Good   Static Standing Good   Ambulatory Fair +   Endurance Deficit   Endurance Deficit No   Activity Tolerance   Activity Tolerance Patient tolerated treatment well   Medical Staff Made Aware Tiger text conversation with resident and Albert Condon from case management   Nurse Uriel coordination with RN before and after session aS patient was described to require assistance of 1 to ambulate to and from bathroom, and reportedly family had concerns of patient being discharged to home today   Assessment   Prognosis Fair   Problem List Impaired balance;Decreased mobility; Impaired hearing;Decreased safety awareness; Impaired judgement;Decreased cognition  (cognition vs [place], gait deviations)   Assessment Assessment: Pt is a 80 y o  female seen for PT evaluation s/p admit to [facility] on 7/8/2022 w/ Pulmonary edema cardiac cause ([facility] )  Order placed for PT  Prior to admission: Pt lived alone in a 1 floor setup of a 2 story home, steps to enter, bed DME prior to admission  Upon evaluation: Pt did not require any physical assistance for bed mobility, transfers, gait, or stairs  Pt's clinical presentation is currently unstable/unpredictable given the functional mobility deficits above, especially (but not limited to) gait deviations, coupled with fall risks including slower than 0 7 m/sec gait speed, impaired balance and Limited cognition and/ or hearing, and combined with medical complications of hypertension , bradycardia, abnormal potassium values and Recent need for oxygen  However, patient appears to be close to her mobility independence vs assistant baseline, without any losses of balance during assessment--just more limited with higher level balance activities and increased activity tolerance  Would Recommend outpatient PT for the same  Additionally would recommend further cognitive assessment in the future to further determine cognitive verses [place] issues    No further IP PT indicated at this time   Barriers to Discharge Inaccessible home environment;Decreased caregiver support  (Patient reportedly home alone)   Goals   Patient Goals To leave today   Plan   PT Frequency   (DC from IP PT)   Recommendation   PT Discharge Recommendation Home with outpatient rehabilitation   AM-PAC Basic Mobility Inpatient   Turning in Bed Without Bedrails 4   Lying on Back to Sitting on Edge of Flat Bed 3   Moving Bed to Chair 4   Standing Up From Chair 4   Walk in Room 3   Climb 3-5 Stairs 3   Basic Mobility Inpatient Raw Score 21   Basic Mobility Standardized Score 45 55   Highest Level Of Mobility   [facility] Goal 6: Walk 10 steps or more   End of Consult   Patient Position at End of Consult All needs within reach; Supine   (Please find full objective findings from PT assessment regarding body systems outlined above)  Pt to benefit from continued skilled PT tx while in hospital and upon DC to address deficits as defined above and maximize level of functional mobility  Co-morbidities affecting pt's physical performance at time of assessment include but not limited to :  Coronary artery disease, Diabetes, HL (hearing loss), Hypertension, documentation of Alzheimer's dementia with cognitive decline, moderate to severe aortic stenosis  Personal factors affecting pt at time of IE include: steps to enter environment, limited home support, advanced age, past experience, behavioral pattern, inability to perform IADLs and limited insight into impairments  The patient's [facility] Basic Mobility Inpatient Short Form Raw Score is 21, Standardized Score is 45 55  A standardized score greater than 40 78 or Raw Score of 16 suggests the patient may benefit from discharge to home, which DOES coincide with CURRENT above PT recommendations  However please refer to therapist recommendation for discharge planning given other factors that may influence destination  Adapted from Nissa Ramon of AM-PAC 6-Clicks Basic Mobility and Daily Activity Scores With Discharge Destination  Physical Therapy, 2021;101:1-9  DOI: 10 [email or web address]      Portions of the record may have been created with voice recognition software  Occasional wrong word or "sound a like" substitutions may have occurred due to the inherent limitations of voice recognition software    Read the chart carefully and recognize, using context, where substitutions have occurred

## 2022-07-10 NOTE — ASSESSMENT & PLAN NOTE
With a pulmonary artery pressure between 45-55 mm  Likely secondary to severe aortic stenosis and mitral regurgitation All of this contributing to her pulmonary edema

## 2022-07-10 NOTE — ASSESSMENT & PLAN NOTE
Wt Readings from Last 3 Encounters:   07/09/22 50 4 kg (111 lb 1 8 oz)   05/03/22 52 2 kg (115 lb)   04/27/22 54 kg (119 lb)     59-year-old female admitted with worsening shortness of breath over the past 2 days  Complains of orthopnea denies paroxysmal nocturnal dyspnea patient states that she has been noticing tiredness and shortness of breath needing to take a rest over the past few months but she thought it was due to old age  Has severe aortic stenosis and was told to follow-up with cardiology but has not done so silhouette  No prior history of CHF on PCP notes  X-ray chest revealing pulmonary edema  Patient responding well to IV diuresis  Was not on diuretics at home  Has responded well to IV Lasix here  On Lasix 40 mg p o  Once a day  Strict input output and daily weights     Cardiology following  Pulmonary edema likely secondary to severe aortic stenosis with diastolic dysfunction

## 2022-07-10 NOTE — PLAN OF CARE
Problem: MOBILITY - ADULT  Goal: Maintain or return to baseline ADL function  Description: INTERVENTIONS:  -  Assess patient's ability to carry out ADLs; assess patient's baseline for ADL function and identify physical deficits which impact ability to perform ADLs (bathing, care of mouth/teeth, toileting, grooming, dressing, etc )  - Assess/evaluate cause of self-care deficits   - Assess range of motion  - Assess patient's mobility; develop plan if impaired  - Assess patient's need for assistive devices and provide as appropriate  - Encourage maximum independence but intervene and supervise when necessary  - Involve family in performance of ADLs  - Assess for home care needs following discharge   - Consider OT consult to assist with ADL evaluation and planning for discharge  - Provide patient education as appropriate  Outcome: Progressing  Goal: Maintains/Returns to pre admission functional level  Description: INTERVENTIONS:  - Perform BMAT or MOVE assessment daily    - Set and communicate daily mobility goal to care team and patient/family/caregiver     - Collaborate with rehabilitation services on mobility goals if consulted  - Out of bed for toileting  - Record patient progress and toleration of activity level   Outcome: Progressing     Problem: Potential for Falls  Goal: Patient will remain free of falls  Description: INTERVENTIONS:  - Educate patient/family on patient safety including physical limitations  - Instruct patient to call for assistance with activity   - Consult OT/PT to assist with strengthening/mobility   - Keep Call bell within reach  - Keep bed low and locked with side rails adjusted as appropriate  - Keep care items and personal belongings within reach  - Initiate and maintain comfort rounds  - Make Fall Risk Sign visible to staff  - Apply yellow socks and bracelet for high fall risk patients  - Consider moving patient to room near nurses station  Outcome: Progressing     Problem: Nutrition/Hydration-ADULT  Goal: Nutrient/Hydration intake appropriate for improving, restoring or maintaining nutritional needs  Description: Monitor and assess patient's nutrition/hydration status for malnutrition  Collaborate with interdisciplinary team and initiate plan and interventions as ordered  Monitor patient's weight and dietary intake as ordered or per policy  Utilize nutrition screening tool and intervene as necessary  Determine patient's food preferences and provide high-protein, high-caloric foods as appropriate       INTERVENTIONS:  - Monitor oral intake, urinary output, labs, and treatment plans  - Assess nutrition and hydration status and recommend course of action  - Evaluate amount of meals eaten  - Assist patient with eating if necessary   - Allow adequate time for meals  - Recommend/ encourage appropriate diets, oral nutritional supplements, and vitamin/mineral supplements  - Order, calculate, and assess calorie counts as needed  - Recommend, monitor, and adjust tube feedings and TPN/PPN based on assessed needs  - Assess need for intravenous fluids  - Provide specific nutrition/hydration education as appropriate  - Include patient/family/caregiver in decisions related to nutrition  Outcome: Progressing     Problem: Prexisting or High Potential for Compromised Skin Integrity  Goal: Skin integrity is maintained or improved  Description: INTERVENTIONS:  - Identify patients at risk for skin breakdown  - Assess and monitor skin integrity  - Assess and monitor nutrition and hydration status  - Monitor labs   - Assess for incontinence   - Turn and reposition patient  - Assist with mobility/ambulation  - Relieve pressure over bony prominences  - Avoid friction and shearing  - Provide appropriate hygiene as needed including keeping skin clean and dry  - Evaluate need for skin moisturizer/barrier cream  - Collaborate with interdisciplinary team   - Patient/family teaching  - Consider wound care consult Outcome: Progressing

## 2022-07-10 NOTE — ASSESSMENT & PLAN NOTE
Blood pressure is stable  Continue amlodipine 2 5 mg daily, lisinopril    Nadolol was discontinued secondary to bradycardia    Continue to hold until out patient follow-up

## 2022-07-10 NOTE — ASSESSMENT & PLAN NOTE
Secondary to pulmonary edema    Initially required BiPAP with O2 sats in the 70s currently with IV Lasix breathing has improved was on 5 L nasal cannula      Patient is currently saturating above 90% on room air

## 2022-07-10 NOTE — ASSESSMENT & PLAN NOTE
Lab Results   Component Value Date    HGBA1C 6 3 05/03/2022       Recent Labs     07/09/22  0744 07/09/22  1242 07/09/22 2123 07/10/22  0714   POCGLU 125 178* 166* 136       Blood Sugar Average: Last 72 hrs:  Well-controlled is on metformin at home which I will hold while inpatient and keep her on a sliding scale coverage  (P) [phone]

## 2022-07-10 NOTE — DISCHARGE SUMMARY
[facility]  Discharge- [address] [facility] 9/17/1929, 80 y o  female MRN: [id number]  Unit/Bed#: S -01 Encounter: [id number]  Primary Care Provider: Pura Zaragoza MD   Date and time admitted to hospital: 7/8/2022  7:23 AM    * Pulmonary edema cardiac cause [facility])  Assessment & Plan  Wt Readings from Last 3 Encounters:   07/09/22 50 4 kg (111 lb 1 8 oz)   05/03/22 52 2 kg (115 lb)   04/27/22 54 kg (119 lb)     80-year-old female admitted with worsening shortness of breath over the past 2 days  Complains of orthopnea denies paroxysmal nocturnal dyspnea patient states that she has been noticing tiredness and shortness of breath needing to take a rest over the past few months but she thought it was due to old age  Has severe aortic stenosis and was told to follow-up with cardiology but has not done so silhouette  No prior history of CHF on PCP notes  X-ray chest revealing pulmonary edema  Patient responding well to IV diuresis  Was not on diuretics at home  Has responded well to IV Lasix here  On Lasix 40 mg p o  Once a day  Strict input output and daily weights  Cardiology following  Pulmonary edema likely secondary to severe aortic stenosis with diastolic dysfunction      Severe aortic stenosis  Assessment & Plan  Patient noted to have severe aortic stenosis with a valve size of 0 9 cm2  Was referred to see Cardiology as outpatient however she never made it  Also has additional pulmonary hypertension  We will have Cardiology evaluate  Discussed with daughter-they would prefer her to be treated conservatively  TTE on 07/08 showed EF of 50%, critical aortic stenosis, severe mitral regurgitation  Cardiology following    Pulmonary hypertension ([address] )  Assessment & Plan  With a pulmonary artery pressure between 45-55 mm  Likely secondary to severe aortic stenosis and mitral regurgitation All of this contributing to her pulmonary edema        Hypertension  Assessment & Plan  Blood pressure is stable  Continue amlodipine 2 5 mg daily, lisinopril    Nadolol was discontinued secondary to bradycardia  Continue to hold until out patient follow-up    Diabetes [facility])  Assessment & Plan  Lab Results   Component Value Date    HGBA1C 6 3 05/03/2022       Recent Labs     07/09/22  1242 07/09/22  2123 07/10/22  0714 07/10/22  1112   POCGLU 178* 166* 136 152*       Blood Sugar Average: Last 72 hrs:  Well-controlled is on metformin at home which I will hold while inpatient and keep her on a sliding scale coverage  (P) 160      Medical Problems             Resolved Problems  Date Reviewed: 7/10/2022          Resolved    Acute respiratory failure with hypoxia ([facility] [address] ) 7/10/2022     Resolved by  Albert Mota DO              Discharging Resident: Albert Mota DO  Discharging Attending: Nadia Riley MD  PCP: Yulissa Ford MD  Admission Date:   Admission Orders (From admission, onward)     Ordered        07/08/22 1037  Inpatient Admission  Once                      Discharge Date: 07/10/22    Consultations During Hospital Stay:  · Nutrition    Procedures Performed:   · EKG    Significant Findings / Test Results:   · Echocardiogram revealed critical aortic stenosis and severe mitral regurgitation    Incidental Findings:   · None     Test Results Pending at Discharge (will require follow up): · None     Outpatient Tests Requested:  · BMP    Complications:  None    Reason for Admission:  Acute hypoxic respiratory failure    Hospital Course: Nacho Randhawa is a 80 y o  female patient who originally presented to the hospital on 7/8/2022 due to shortness of breath worsening over 2 days  Patient also complained of orthopnea denied paroxysmal nocturnal dyspnea  She has been noticing increased tiredness and shortness of breath and having to take more rest   She does have a history of severe aortic stenosis  Chest x-ray revealed pulmonary edema    Patient was started on IV Lasix  A new echocardiogram done on 07/09/2022 revealed critical aortic stenosis, EF 86%, grade 1 diastolic heart failure, severe mitral regurgitation  Her home nadolol was discontinued due to bradycardia  She responded well to IV Lasix and transitioned to Lasix p o  She will be discharged on Lasix, 20 mg tablets potassium, a BMP, and Cardiology follow-up  Please see above list of diagnoses and related plan for additional information  Condition at Discharge: good    Discharge Day Visit / Exam:   Subjective:  Patient feels well today  She requires no supplemental oxygen  She has no shortness of breath, swelling in her legs  States she is ready to go home  Vitals: Blood Pressure: 150/75 (07/10/22 0715)  Pulse: 67 (07/10/22 0715)  Temperature: 97 8 °F (36 6 °C) (07/09/22 2124)  Temp Source: Oral (07/09/22 1600)  Respirations: 18 (07/10/22 0715)  Height: 5' (152 4 cm) (07/08/22 1515)  Weight - Scale: 50 4 kg (111 lb 1 8 oz) (07/09/22 0203)  SpO2: 95 % (07/10/22 0715)  Exam:   Physical Exam  Constitutional:       Appearance: She is normal weight  HENT:      Mouth/Throat:      Mouth: Mucous membranes are moist       Pharynx: Oropharynx is clear  Cardiovascular:      Rate and Rhythm: Normal rate and regular rhythm  Heart sounds: Murmur heard  Pulmonary:      Effort: Pulmonary effort is normal       Breath sounds: Normal breath sounds  No wheezing, rhonchi or rales  Abdominal:      General: Abdomen is flat  Palpations: Abdomen is soft  Tenderness: There is no abdominal tenderness  Musculoskeletal:      Right lower leg: No edema  Left lower leg: No edema  Skin:     General: Skin is warm and dry  Neurological:      Mental Status: She is alert and oriented to person, place, and time  Psychiatric:         Mood and Affect: Mood normal          Behavior: Behavior normal           Discussion with Family: Updated  (daughter) at bedside      Discharge instructions/Information to patient and family:   See after visit summary for information provided to patient and family  Provisions for Follow-Up Care:  See after visit summary for information related to follow-up care and any pertinent home health orders  Disposition:   Home    Planned Readmission:  No    Discharge Medications:  See after visit summary for reconciled discharge medications provided to patient and/or family        **Please Note: This note may have been constructed using a voice recognition system**

## 2022-07-10 NOTE — PLAN OF CARE
Problem: MOBILITY - ADULT  Goal: Maintain or return to baseline ADL function  Description: INTERVENTIONS:  -  Assess patient's ability to carry out ADLs; assess patient's baseline for ADL function and identify physical deficits which impact ability to perform ADLs (bathing, care of mouth/teeth, toileting, grooming, dressing, etc )  - Assess/evaluate cause of self-care deficits   - Assess range of motion  - Assess patient's mobility; develop plan if impaired  - Assess patient's need for assistive devices and provide as appropriate  - Encourage maximum independence but intervene and supervise when necessary  - Involve family in performance of ADLs  - Assess for home care needs following discharge   - Consider OT consult to assist with ADL evaluation and planning for discharge  - Provide patient education as appropriate  7/10/2022 1302 by Hitesh Alejandre RN  Outcome: Adequate for Discharge  7/10/2022 0940 by Hitesh Alejandre RN  Outcome: Progressing  Goal: Maintains/Returns to pre admission functional level  Description: INTERVENTIONS:  - Perform BMAT or MOVE assessment daily    - Set and communicate daily mobility goal to care team and patient/family/caregiver     - Collaborate with rehabilitation services on mobility goals if consulted  - Out of bed for toileting  - Record patient progress and toleration of activity level   7/10/2022 1302 by Hitesh Alejandre RN  Outcome: Adequate for Discharge  7/10/2022 0940 by Hitesh Alejandre RN  Outcome: Progressing     Problem: Potential for Falls  Goal: Patient will remain free of falls  Description: INTERVENTIONS:  - Educate patient/family on patient safety including physical limitations  - Instruct patient to call for assistance with activity   - Consult OT/PT to assist with strengthening/mobility   - Keep Call bell within reach  - Keep bed low and locked with side rails adjusted as appropriate  - Keep care items and personal belongings within reach  - Initiate and maintain comfort rounds  - Make Fall Risk Sign visible to staff  - Apply yellow socks and bracelet for high fall risk patients  - Consider moving patient to room near nurses station  7/10/2022 1302 by Karley Givens RN  Outcome: Adequate for Discharge  7/10/2022 0940 by Karley Givens RN  Outcome: Progressing     Problem: Nutrition/Hydration-ADULT  Goal: Nutrient/Hydration intake appropriate for improving, restoring or maintaining nutritional needs  Description: Monitor and assess patient's nutrition/hydration status for malnutrition  Collaborate with interdisciplinary team and initiate plan and interventions as ordered  Monitor patient's weight and dietary intake as ordered or per policy  Utilize nutrition screening tool and intervene as necessary  Determine patient's food preferences and provide high-protein, high-caloric foods as appropriate       INTERVENTIONS:  - Monitor oral intake, urinary output, labs, and treatment plans  - Assess nutrition and hydration status and recommend course of action  - Evaluate amount of meals eaten  - Assist patient with eating if necessary   - Allow adequate time for meals  - Recommend/ encourage appropriate diets, oral nutritional supplements, and vitamin/mineral supplements  - Order, calculate, and assess calorie counts as needed  - Recommend, monitor, and adjust tube feedings and TPN/PPN based on assessed needs  - Assess need for intravenous fluids  - Provide specific nutrition/hydration education as appropriate  - Include patient/family/caregiver in decisions related to nutrition  7/10/2022 1302 by Karley Givens RN  Outcome: Adequate for Discharge  7/10/2022 0940 by Karley Givens RN  Outcome: Progressing     Problem: Prexisting or High Potential for Compromised Skin Integrity  Goal: Skin integrity is maintained or improved  Description: INTERVENTIONS:  - Identify patients at risk for skin breakdown  - Assess and monitor skin integrity  - Assess and monitor nutrition and hydration status  - Monitor labs   - Assess for incontinence   - Turn and reposition patient  - Assist with mobility/ambulation  - Relieve pressure over bony prominences  - Avoid friction and shearing  - Provide appropriate hygiene as needed including keeping skin clean and dry  - Evaluate need for skin moisturizer/barrier cream  - Collaborate with interdisciplinary team   - Patient/family teaching  - Consider wound care consult   7/10/2022 1302 by Panfilo Sabillon RN  Outcome: Adequate for Discharge  7/10/2022 0940 by Panfilo Sabillon RN  Outcome: Progressing

## 2022-07-10 NOTE — ASSESSMENT & PLAN NOTE
Lab Results   Component Value Date    HGBA1C 6 3 05/03/2022       Recent Labs     07/09/22  1242 07/09/22  2123 07/10/22  0714 07/10/22  1112   POCGLU 178* 166* 136 152*       Blood Sugar Average: Last 72 hrs:  Well-controlled is on metformin at home which I will hold while inpatient and keep her on a sliding scale coverage  (P) 160

## 2022-07-10 NOTE — PROGRESS NOTES
Cardiology Progress Note - Jodi Bassett 80 y o  female MRN: [id number]    Unit/Bed#: S -01 Encounter: [id number]      Assessment/Recommendations:  1  Acute diastolic heart failure:  Likely secondary to valvular heart disease  With moderate aortic stenosis and moderate to severe mitral regurgitation  Tolerating change to p o  Lasix from yesterday, strict I&Os and daily weights  Making progress as far as volume status clinically  Off O2 support and maintaining O2 sats  2  Valvular heart disease: With severe AS and moderate to severe mitral regurgitation  Continue on volume management as per above  Eventual discussions regarding AVR/MVR as an outpatient  3  Acute hypoxic respiratory failure:  Secondary to 1 , continue on oxygen support and diuresis  4  Prior remote MI:  At the age of 27, unclear details  No prior reports of stenting or PCI  No ischemic symptoms  Continued on beta-blocker and statin  5  Hypertension:  Relatively at goal today  Continue current regimen of amlodipine, lisinopril, beta-blocker  6  Hyperlipidemia:  Continued on statin  7  Type 2 diabetes mellitus:  As per primary team   8  Hypothyroidism:  As per primary team   9  Stable from cardiac standpoint for discharge today with outpatient follow-up recommended  Subjective:   Patient seen and examined  No significant events overnight   stable dyspnea on exertion, ; pertinent negatives - chest pain, chest pressure/discomfort, lower extremity edema and palpitations  Objective:     Vitals: Blood pressure 150/75, pulse 67, temperature 97 8 °F (36 6 °C), resp  rate 18, height 5' (1 524 m), weight 50 4 kg (111 lb 1 8 oz), SpO2 95 %  , Body mass index is 21 7 kg/m² ,   Orthostatic Blood Pressures    Flowsheet Row Most Recent Value   Blood Pressure 150/75 filed at 07/10/2022 0715   Patient Position - Orthostatic VS Sitting filed at 07/09/2022 1100            Intake/Output Summary (Last 24 hours) at 7/10/2022 0840  Last data filed at 7/10/2022 0501  Gross per 24 hour   Intake 360 ml   Output 500 ml   Net -140 ml       TELE:  No significant arrhythmias overnight    Physical Exam:    GEN: Sunni Mcfarland appears well, alert and oriented x 3, pleasant and cooperative   HEENT: pupils equal, round, and reactive to light; extraocular muscles intact  NECK: supple, no carotid bruits   HEART: regular rhythm, normal S1 and S2, +systolic murmur, no clicks, gallops or rubs   LUNGS: clear to auscultation bilaterally; no wheezes, rales, or rhonchi   ABDOMEN: normal bowel sounds, soft, no tenderness, no distention  EXTREMITIES: peripheral pulses normal; no clubbing, cyanosis, or edema  NEURO: no focal findings   SKIN: normal without suspicious lesions on exposed skin        Medications:      Current Facility-Administered Medications:     acetaminophen (TYLENOL) tablet 650 mg, 650 mg, Oral, Q4H PRN, Lavonna Edin, CRNP    ALPRAZolam Karilyn Meline) tablet 0 25 mg, 0 25 mg, Oral, Daily PRN, Lavonna Edin, CRNP    amLODIPine (NORVASC) tablet 2 5 mg, 2 5 mg, Oral, Daily, Lavonna Edin, CRNP, 2 5 mg at 07/09/22 0815    atorvastatin (LIPITOR) tablet 40 mg, 40 mg, Oral, Daily, Lavonna Edin, CRNP, 40 mg at 07/09/22 7734    cyanocobalamin (VITAMIN B-12) tablet 1,000 mcg, 1,000 mcg, Oral, Daily, Lavonna Edin, CRNP, 1,000 mcg at 07/09/22 5935    docusate sodium (COLACE) capsule 100 mg, 100 mg, Oral, BID, Lavonna Edin, CRNP, 100 mg at 07/09/22 1724    enoxaparin (LOVENOX) subcutaneous injection 30 mg, 30 mg, Subcutaneous, Daily, Lavonna Edin, CRNP, 30 mg at 07/09/22 1437    furosemide (LASIX) tablet 40 mg, 40 mg, Oral, Daily, Leanna Mena MD    hydrALAZINE (APRESOLINE) injection 5 mg, 5 mg, Intravenous, Q6H PRN, Lavonna Edin, CRNP, 5 mg at 07/09/22 2135    insulin lispro (HumaLOG) 100 units/mL subcutaneous injection 1-5 Units, 1-5 Units, Subcutaneous, TID AC, 1 Units at 07/09/22 1724 **AND** Fingerstick Glucose (POCT), , , TID MARY, Adri Jesus, BLANCA    insulin lispro (HumaLOG) 100 units/mL subcutaneous injection 1-5 Units, 1-5 Units, Subcutaneous, HS, Lavonna Edin, CRNP, 1 Units at 07/08/22 2207    levothyroxine tablet 25 mcg, 25 mcg, Oral, Early Morning, Lavonna Edin, CRNP, 25 mcg at 07/10/22 0554    lisinopril (ZESTRIL) tablet 20 mg, 20 mg, Oral, Daily, Lavonna Edni, CRNP, 20 mg at 07/09/22 0814    ondansetron (ZOFRAN) injection 4 mg, 4 mg, Intravenous, Q6H PRN, Lavonna Edin, CRNP    potassium chloride (K-DUR,KLOR-CON) CR tablet 20 mEq, 20 mEq, Oral, Daily, Lavonna Edin, CRNP, 20 mEq at 07/09/22 2957    potassium chloride (K-DUR,KLOR-CON) CR tablet 40 mEq, 40 mEq, Oral, Once, Radha Wilkes MD    sertraline (ZOLOFT) tablet 25 mg, 25 mg, Oral, Daily, Lavonna Edin, CRNP, 25 mg at 07/09/22 5775     Labs & Results:        Results from last 7 days   Lab Units 07/08/22  1252 07/08/22  0800   WBC Thousand/uL  --  8 72   HEMOGLOBIN g/dL  --  11 6   HEMATOCRIT %  --  35 8   PLATELETS Thousands/uL 326 307         Results from last 7 days   Lab Units 07/10/22  0443 07/09/22  0552 07/08/22  0800   POTASSIUM mmol/L 3 4* 3 9 4 7   CHLORIDE mmol/L 98 96 97   CO2 mmol/L 28 28 26   BUN mg/dL 25 23 16   CREATININE mg/dL 0 79 0 83 0 79   CALCIUM mg/dL 9 7 9 3 9 0   ALK PHOS U/L  --   --  85   ALT U/L  --   --  11   AST U/L  --   --  21         Results from last 7 days   Lab Units 07/10/22  0443   MAGNESIUM mg/dL 2 0       Echo: personally reviewed - normal LV size and function  Normal wall motion  Grade 2 diastolic dysfunction  Left atrial enlargement  Critical aortic stenosis  Moderate to severe mitral regurgitation  Mild tricuspid regurgitation  Moderate pulmonary hypertension      EKG personally reviewed by Leanna Mena MD

## 2022-07-10 NOTE — ASSESSMENT & PLAN NOTE
Wt Readings from Last 3 Encounters:   07/09/22 50 4 kg (111 lb 1 8 oz)   05/03/22 52 2 kg (115 lb)   04/27/22 54 kg (119 lb)     80-year-old female admitted with worsening shortness of breath over the past 2 days  Complains of orthopnea denies paroxysmal nocturnal dyspnea patient states that she has been noticing tiredness and shortness of breath needing to take a rest over the past few months but she thought it was due to old age  Has severe aortic stenosis and was told to follow-up with cardiology but has not done so silhouette  No prior history of CHF on PCP notes  X-ray chest revealing pulmonary edema  Patient responding well to IV diuresis  Was not on diuretics at home  Has responded well to IV Lasix here  On Lasix 40 mg p o  Once a day  Strict input output and daily weights     Cardiology following  Pulmonary edema likely secondary to severe aortic stenosis with diastolic dysfunction

## 2022-07-10 NOTE — PROGRESS NOTES
[facility]  Progress Note - [facility] 9/17/1929, 80 y o  female MRN: [id number]  Unit/Bed#: S -01 Encounter: [id number]  Primary Care Provider: Kenzie Huber MD   Date and time admitted to hospital: 7/8/2022  7:23 AM    * Pulmonary edema cardiac cause [facility])  Assessment & Plan  Wt Readings from Last 3 Encounters:   07/09/22 50 4 kg (111 lb 1 8 oz)   05/03/22 52 2 kg (115 lb)   04/27/22 54 kg (119 lb)     66-year-old female admitted with worsening shortness of breath over the past 2 days  Complains of orthopnea denies paroxysmal nocturnal dyspnea patient states that she has been noticing tiredness and shortness of breath needing to take a rest over the past few months but she thought it was due to old age  Has severe aortic stenosis and was told to follow-up with cardiology but has not done so silhouette  No prior history of CHF on PCP notes  X-ray chest revealing pulmonary edema  Patient responding well to IV diuresis  Was not on diuretics at home  Has responded well to IV Lasix here  On Lasix 40 mg p o  Once a day  Strict input output and daily weights  Cardiology following  Pulmonary edema likely secondary to severe aortic stenosis with diastolic dysfunction      Severe aortic stenosis  Assessment & Plan  Patient noted to have severe aortic stenosis with a valve size of 0 9 cm2  Was referred to see Cardiology as outpatient however she never made it  Also has additional pulmonary hypertension  We will have Cardiology evaluate  Discussed with daughter-they would prefer her to be treated conservatively  TTE on 07/08 showed EF of 50%, critical aortic stenosis, severe mitral regurgitation  Cardiology following    Pulmonary hypertension ([address] )  Assessment & Plan  With a pulmonary artery pressure between 45-55 mm  All of this contributing to her pulmonary edema  Acute respiratory failure with hypoxia [facility])  Assessment & Plan  Secondary to pulmonary edema  Initially required BiPAP with O2 sats in the 70s currently with IV Lasix breathing has improved was on 5 L nasal cannula      Patient is currently saturating above 90% on room air    Hypertension  Assessment & Plan  Blood pressure is stable  Continue amlodipine 2 5 mg daily, lisinopril    Nadolol was discontinued secondary to bradycardia    Diabetes [facility])  Assessment & Plan  Lab Results   Component Value Date    HGBA1C 6 3 2022       Recent Labs     22  0744 22  1242 22  2123 07/10/22  0714   POCGLU 125 178* 166* 136       Blood Sugar Average: Last 72 hrs:  Well-controlled is on metformin at home which I will hold while inpatient and keep her on a sliding scale coverage  (P) 161 [phone]        VTE Pharmacologic Prophylaxis: VTE Score: 3 Moderate Risk (Score 3-4) - Pharmacological DVT Prophylaxis Ordered: enoxaparin (Lovenox)  Patient Centered Rounds: I performed bedside rounds with nursing staff today  Discussions with Specialists or Other Care Team Provider:     Education and Discussions with Family / Patient: Patient declined call to   Current Length of Stay: 2 day(s)  Current Patient Status: Inpatient   Discharge Plan: Anticipate discharge later today or tomorrow to home  Code Status: Level 3 - DNAR and DNI    Subjective:   Patient feeling well today  She has no complaints  Says she is breathing well, has no pain  Had a small bowel movement yesterday, so she feels as if he will have a normal bowel movement today  She is having good urine output  Objective:     Vitals:   Temp (24hrs), Av 8 °F (36 6 °C), Min:97 7 °F (36 5 °C), Max:98 °F (36 7 °C)    Temp:  [97 7 °F (36 5 °C)-98 °F (36 7 °C)] 97 8 °F (36 6 °C)  HR:  [52-72] 67  Resp:  [18-24] 18  BP: (133-182)/(64-90) 150/75  SpO2:  [92 %-95 %] 95 %  Body mass index is 21 7 kg/m²  Input and Output Summary (last 24 hours):      Intake/Output Summary (Last 24 hours) at 7/10/2022 0802  Last data filed at 7/10/2022 0501  Gross per 24 hour   Intake 360 ml   Output 500 ml   Net -140 ml       Physical Exam:   Physical Exam  Constitutional:       Appearance: She is normal weight  HENT:      Mouth/Throat:      Mouth: Mucous membranes are moist    Cardiovascular:      Rate and Rhythm: Normal rate and regular rhythm  Pulses: Normal pulses  Comments: Grade 3/6 systolic murmur  Pulmonary:      Effort: Pulmonary effort is normal  No respiratory distress  Breath sounds: Normal breath sounds  No rhonchi or rales  Abdominal:      General: Abdomen is flat  Palpations: Abdomen is soft  Tenderness: There is no abdominal tenderness  Musculoskeletal:      Right lower leg: No edema  Left lower leg: No edema  Skin:     General: Skin is warm and dry  Neurological:      Mental Status: She is alert and oriented to person, place, and time  Additional Data:     Labs:  Results from last 7 days   Lab Units 07/08/22  1252 07/08/22  0800   WBC Thousand/uL  --  8 72   HEMOGLOBIN g/dL  --  11 6   HEMATOCRIT %  --  35 8   PLATELETS Thousands/uL 326 307   NEUTROS PCT %  --  66   LYMPHS PCT %  --  21   MONOS PCT %  --  9   EOS PCT %  --  2     Results from last 7 days   Lab Units 07/10/22  0443 07/09/22  0552 07/08/22  0800   SODIUM mmol/L 136   < > 130*   POTASSIUM mmol/L 3 4*   < > 4 7   CHLORIDE mmol/L 98   < > 97   CO2 mmol/L 28   < > 26   BUN mg/dL 25   < > 16   CREATININE mg/dL 0 79   < > 0 79   ANION GAP mmol/L 10   < > 7   CALCIUM mg/dL 9 7   < > 9 0   ALBUMIN g/dL  --   --  3 8   TOTAL BILIRUBIN mg/dL  --   --  0 91   ALK PHOS U/L  --   --  85   ALT U/L  --   --  11   AST U/L  --   --  21   GLUCOSE RANDOM mg/dL 127   < > 181*    < > = values in this interval not displayed           Results from last 7 days   Lab Units 07/10/22  0714 07/09/22  2123 07/09/22  1242 07/09/22  0744 07/08/22  2112 07/08/22  1556 07/08/22  1249   POC GLUCOSE mg/dl 136 166* 178* 125 183* 192* 148* Lines/Drains:  Invasive Devices  Report    Peripheral Intravenous Line  Duration           Peripheral IV 07/08/22 Right Antecubital 2 days          Drain  Duration           External Urinary Catheter 1 day                  Telemetry:  Telemetry Orders (From admission, onward)             48 Hour Telemetry Monitoring  Continuous x 48 hours        Expiring   References:    Telemetry Guidelines   Question:  Reason for 48 Hour Telemetry  Answer:  Acute Decompensated CHF (continuous diuretic infusion or total diuretic dose > 200 mg daily, associated electrolyte derangement, ionotropic drip, history of ventricular arrhythmia, or new EF <35%)                 Telemetry Reviewed: Normal Sinus Rhythm  Indication for Continued Telemetry Use: No indication for continued use  Will discontinue                Imaging: Reviewed radiology reports from this admission including: ECHO    Recent Cultures (last 7 days):         Last 24 Hours Medication List:   Current Facility-Administered Medications   Medication Dose Route Frequency Provider Last Rate    acetaminophen  650 mg Oral Q4H PRN Ktaherine Solar, CRNP      ALPRAZolam  0 25 mg Oral Daily PRN Katherine Solar, CRNP      amLODIPine  2 5 mg Oral Daily Katherine Solar, CRNP      atorvastatin  40 mg Oral Daily Katherine Solar, [address]      vitamin B-12  1,000 mcg Oral Daily Katherine Solar, CRNP      docusate sodium  100 mg Oral BID Katherine Solar, CRNP      enoxaparin  30 mg Subcutaneous Daily Katherine Solar, CRNP      furosemide  40 mg Oral Daily Yumiko Galan MD      hydrALAZINE  5 mg Intravenous Q6H PRN Katherine Solar, CRNP      insulin lispro  1-5 Units Subcutaneous TID [facility] Katherine Solar, CRNP      insulin lispro  1-5 Units Subcutaneous HS Katherine Solar, CRNP      levothyroxine  25 mcg Oral Early Morning Katherine Solar, CRNP      lisinopril  20 mg Oral Daily Katherine Solar, CRNP      ondansetron  4 mg Intravenous Q6H PRN Katherine Solar, CRNP      potassium chloride  20 mEq Oral Daily Katherine Solar, CRNP      potassium chloride  40 mEq Oral Once Liliana Charles MD      sertraline  25 mg Oral Daily BLANCA Burger          Today, Patient Was Seen By: Lauren Vyas DO    **Please Note: This note may have been constructed using a voice recognition system  **

## 2022-07-10 NOTE — DISCHARGE INSTR - AVS FIRST PAGE
Dear Nacho Randhawa,     It was our pleasure to care for you here at [facility], [address]  It is our hope that we were always able to exceed the expected standards for your care during your stay  You were hospitalized due to acute hypoxic respiratory failure  You were cared for on the [facility] 4th floor by Albert Mota DO under the service of Twin Sharma MD with the [facility] who covers for your primary care physician (PCP), Yulissa Ford MD, while you were hospitalized  If you have any questions or concerns related to this hospitalization, you may contact us at [phone]  For follow up as well as any medication refills, we recommend that you follow up with your primary care physician  A registered nurse will reach out to you by phone within a few days after your discharge to answer any additional questions that you may have after going home  However, at this time we provide for you here, the most important instructions / recommendations at discharge:     Notable Medication Adjustments -   Stop taking nadolol until follow-up with Cardiology  Start taking Lasix 40 mg daily  Start taking potassium 20 mEq daily for 5 days  Testing Required after Discharge -   BMP in 4-5 days  Important follow up information -   If you experience lightheadedness, dizziness, feeling faint, low blood pressure, shortness of breath, chest pain, uncontrolled diarrhea please return to your nearest Emergency Department  Please follow-up with Cardiology as discussed on 07/22/2022  Please follow-up with your primary care provider within the next 5 days  Other Instructions -   In 4-5 days, you are due for blood work to check your electrolytes and your started on new medication  Please remember to get this done at your nearest lab    Please review this entire after visit summary as additional general instructions including medication list, appointments, activity, diet, any pertinent wound care, and other additional recommendations from your care team that may be provided for you        Sincerely,     Anson Lara, DO

## 2022-07-12 NOTE — UTILIZATION REVIEW
Notification of Discharge   This is a Notification of Discharge from our facility [address]  Please be advised that this patient has been discharge from our facility  Below you will find the admission and discharge date and time including the patients disposition  UTILIZATION REVIEW CONTACT:  Marianne Hammond  Utilization   Network Utilization Review Department  Phone: [phone] x carefully listen to the prompts  All voicemails are confidential   Email: [email or web address]     PHYSICIAN ADVISORY SERVICES:  FOR ULYS-IO-MUMA REVIEW - MEDICAL NECESSITY DENIAL  Phone: [phone]  Fax: [phone]  Email: [email or web address]     PRESENTATION DATE: 7/8/2022  7:23 AM  OBERVATION ADMISSION DATE:   INPATIENT ADMISSION DATE: 7/8/22 10:37 AM   DISCHARGE DATE: 7/10/2022  1:45 PM  DISPOSITION: Home/Self Care Home/Self Care      IMPORTANT INFORMATION:  Send all requests for admission clinical reviews, approved or denied determinations and any other requests to dedicated fax number below belonging to the campus where the patient is receiving treatment   List of dedicated fax numbers:  [address] DENIALS (Administrative/Medical Necessity) [phone]   [address] (Maternity/NICU/Pediatrics) [phone]   Ryan Harkins [phone]   130 [facility] [phone]   [facility]  [phone]   2000 [facility] [address] [place] [address],[address] Floor [address] [phone]   [facility]  [phone]   [address] [facility] [address] [facility]  [address] [address] [address] [facility] [phone]

## 2022-07-21 PROBLEM — E11.9 TYPE 2 DIABETES MELLITUS WITHOUT COMPLICATION, WITHOUT LONG-TERM CURRENT USE OF INSULIN (HCC): Chronic | Status: ACTIVE | Noted: 2020-04-24

## 2022-07-21 PROBLEM — I48.91 NEW ONSET ATRIAL FIBRILLATION (HCC): Status: ACTIVE | Noted: 2022-07-21

## 2022-07-21 PROBLEM — E87.1 HYPONATREMIA: Status: ACTIVE | Noted: 2022-01-01

## 2022-07-21 NOTE — ASSESSMENT & PLAN NOTE
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

## 2022-07-21 NOTE — ASSESSMENT & PLAN NOTE
· BP borderline in the ED  · Hold Lisinopril, Norvasc   · Would eventually restart Lisinopril when Na improves for GMDT   · Monitor closely with Cardizem GTT and Metoprolol

## 2022-07-21 NOTE — H&P
Brenna Cordova 38  9/17/1929, 80 y o  female MRN: [id number]  Unit/Bed#: ED 29 Encounter: [id number]  Primary Care Provider: Pura Zaragoza MD   Date and time admitted to hospital: 7/21/2022 10:51 AM    * New onset atrial fibrillation [facility])  Assessment & Plan  · POA, patient was shaky and clammy today  Found to be in new onset A  Fib with initial  on EKG  Started on Cardizem with good response   · Suspect related to advanced age, valvular disease, as well as her Nadolol being stopped last hospitalization (on this for HTN per patient)  · [id number] = 7   · Admit patient to med/surg under inpatient status   · Cardiology consult appreciated   · Continue Cardizem drip   · Start Metoprolol 25 mg BID   · Eliquis 5 mg BID for anticoagulation   · Check TSH   · Keep K and Mg above 4 and 2 respectively   · Telemetry  · No need to repeat ECHO     Severe aortic stenosis  Assessment & Plan  · Patient planned to follow up with CT surgery today, however now rescheduled to Friday   · Outpatient follow up   · Judicious use of fluids in the ER due to diastolic dysfunction     Coronary artery disease  Assessment & Plan  · ?remote MI history, but no stenting/angioplasty   · Continue statin   · Will be on beta blocker now for rate control     Hyponatremia  Assessment & Plan  · POA, noted at 129   Suspect due to poor oral intake as she hasn't had a strong appetite, she is on Lasix, and she might have some element of SIADH given she is on an ACEI and SSRI   · Status post 500 cc bolus of IVF in ED   · Hold further fluid due to tenuous volume status secondary to AS   · Hold Zoloft  · She was only started on this 1 month ago, family felt this was not helping  · Hold Lisinopril for now   · Would restart as part of GDMT when Na improves  · Continue Lasix   · Check UA, sOSM, uOSM, Elias, Uric Acid, TSH  · Trend BMP    Anxiety  Assessment & Plan  · Appears stable   · Hold Zoloft  · Continue Xanax 0 25 mg QD PRN    Hypertension  Assessment & Plan  · BP borderline in the ED  · Hold Lisinopril, Norvasc   · Would eventually restart Lisinopril when Na improves for GMDT   · Monitor closely with Cardizem GTT and Metoprolol     Type 2 diabetes mellitus without complication, without long-term current use of insulin (HCC)  Assessment & Plan  Lab Results   Component Value Date    HGBA1C 6 3 05/03/2022       No results for input(s): POCGLU in the last 72 hours  Blood Sugar Average: Last 72 hrs:  · Recent A1c controlled   · Hold Metformin  · SSI Algorithm with meals and bedtime  · QID accuchecks       VTE Pharmacologic Prophylaxis: VTE Score: 4 Moderate Risk (Score 3-4) - Pharmacological DVT Prophylaxis Ordered: apixaban (Eliquis)  Code Status: DNR/DNI  Discussion with family: Updated  (daughter) at bedside  Anticipated Length of Stay: Patient will be admitted on an inpatient basis with an anticipated length of stay of greater than 2 midnights secondary to as per above assessment and plan   Total Time for Visit, including Counseling / Coordination of Care: 70 minutes Greater than 50% of this total time spent on direct patient counseling and coordination of care  Chief Complaint: Shakiness    History of Present Illness:  Sotero Craven is a 80 y o  female with a PMH of dCHF, severe AS, CAD, T2DM, HTN who presents with shakiness  Patient was just recently discharged from the hospital after congestive heart failure admission  She reports she has been doing well up until today when she felt shaky on her feet  She reported feeling off balance  Her daughter noted that she appeared clammy  The patient denies falling, dizziness, or losing consciousness  She did report that she had some slight shortness of breath, but states that this shortness of breath was different when she was admitted for congestive heart failure  She denies chest pain, palpitations, or sensation that her heart was racing  She denies any coughing orthopnea  She has not been weighing herself, but states that she does not feel as though she is gaining weight and her legs are not swollen  The patient her daughters at bedside reports she has had a poor oral intake over last few days  They report that she has been mostly eating ice cream, but states that she has been avoiding salt and not salting her food otherwise  The patient her daughters deny the patient having history of any tachyarrhythmias  They do report that she drinks a cup of coffee daily and sometimes T or sodas  Her daughter reports that her nadolol was stopped last hospitalization and that she was on this for blood pressure previously  She also reports she has been on Zoloft for about 1 month, but the patient and the patient's daughter deny noting any change with this medication  Review of Systems:  Review of Systems   Constitutional: Positive for appetite change  Negative for chills, diaphoresis, fatigue, fever and unexpected weight change  HENT: Negative for congestion, rhinorrhea and sore throat  Eyes: Negative for visual disturbance  Respiratory: Positive for shortness of breath  Negative for cough, chest tightness and wheezing  Cardiovascular: Negative for chest pain, palpitations and leg swelling  Gastrointestinal: Negative for abdominal pain, constipation, diarrhea, nausea and vomiting  Genitourinary: Negative for dysuria  Musculoskeletal: Positive for gait problem  Negative for arthralgias and myalgias  Neurological: Negative for dizziness, syncope, weakness, light-headedness, numbness and headaches  All other systems reviewed and are negative        Past Medical and Surgical History:   Past Medical History:   Diagnosis Date    Anxiety     Coronary artery disease     as per stefani    Diabetes ([facility] [address] )     as per stefani    Disease of thyroid gland     Heart attack ([facility] [address] )     as per [place]    High cholesterol     as per Anay RODRIGUEZ (hearing loss)     Wears hearing aid, right    Hyperlipidemia     Hyperlipidemia     as per [place]    Hypertension     as per [place]       Past Surgical History:   Procedure Laterality Date    ADENOIDECTOMY      APPENDECTOMY      as per stefani    EYE SURGERY Bilateral 2015    as per [place]    STAPEDECTOMY      Metal STAPES LEFT ear (can not have an MRI)- as per James Lora TONSILLECTOMY      as per [place]       Meds/Allergies:  Prior to Admission medications    Medication Sig Start Date End Date Taking?  Authorizing Provider   ALPRAZolam [place]) 0 25 mg tablet TAKE ONE TABLET BY MOUTH EVERY DAY AS NEEDED 6/14/22   Mukund Echols MD   amLODIPine (NORVASC) 2 5 mg tablet TAKE ONE TABLET BY MOUTH EVERY MORNING AND ONE TABLET BY MOUTH EVERY EVENING 6/14/22   Mukund Echols MD   atorvastatin (LIPITOR) 40 mg tablet TAKE ONE TABLET BY MOUTH EVERY DAY 7/12/22   Mukund Echols MD   betamethasone valerate (VALISONE) 0 1 % cream Apply topically 2 (two) times a day 1/3/22   Isabelle Espinoza MD   Blood Glucose Monitoring Suppl (OneTouch Verio Reflect) w/Device KIT uad once daily 5/3/22   Mukund Echols MD   cholecalciferol (VITAMIN D3) 1,000 units tablet Take 1,000 Units by mouth daily    Historical Provider, MD   donepezil (ARICEPT) 10 mg tablet TAKE ONE TABLET BY MOUTH AT BEDTIME 7/12/22   Mukund Echols MD   fluocinolone acetonide (DERMOTIC) 0 01 % otic oil Administer 5 drops into both ears 2 (two) times a day as needed (itching) 1/3/22   Isabelle Espinoza MD   furosemide (LASIX) 40 mg tablet Take 1 tablet (40 mg total) by mouth daily 7/11/22 8/10/22  Carmen Garcia DO   glucose blood (OneTouch Verio) test strip Use as instructed once daily 5/3/22   Mukund Echols MD   Lancets (freestyle) lancets TEST BLOOD SUGAR ONCE IN THE MORNING 10/4/21   Mukund Echols MD   levothyroxine 25 mcg tablet TAKE 2 TABLETS BY MOUTH daily other than M and W which you take 1 daily 6/30/22   Mukund Echols MD   lisinopril (ZESTRIL) 20 mg tablet TAKE ONE TABLET BY MOUTH EVERY DAY 6/14/22   Shantelle Leigh MD   metFORMIN (GLUCOPHAGE-XR) 500 mg 24 hr tablet TAKE 2 TABLETS BY MOUTH TWICE A DAY WITH MEALS 5/12/22   Shantelle Leigh MD   neomycin-polymyxin-hydrocortisone (CORTISPORIN) 0 35%-10,000 units/mL-1% otic suspension Administer 4 drops to the right ear 2 (two) times a day for 10 days 1/26/22 2/24/22  BLANCA Escalante   potassium chloride (K-DUR,KLOR-CON) 20 mEq tablet Take 1 tablet (20 mEq total) by mouth daily for 5 days 7/11/22 7/16/22  Rema Ohara DO   sertraline (Zoloft) 25 mg tablet Take 1 tablet (25 mg total) by mouth in the morning  5/13/22   Shantelle Leigh MD   vitamin B-12 (VITAMIN B-12) 1,000 mcg tablet Take by mouth daily    Historical Provider, MD     I have reviewed home medications with patient family member  Allergies: No Known Allergies    Social History:  Marital Status:    Occupation: Noncontributory   Patient Pre-hospital Living Situation: Home  Patient Pre-hospital Level of Mobility: walks  Patient Pre-hospital Diet Restrictions: None  Substance Use History:   Social History     Substance and Sexual Activity   Alcohol Use Yes    Comment: occasional      Social History     Tobacco Use   Smoking Status Never Smoker   Smokeless Tobacco Never Used     Social History     Substance and Sexual Activity   Drug Use Never       Family History:  Family History   Problem Relation Age of Onset    Parkinsonism Mother     Heart attack Father     Coronary artery disease Sister         arteriosclerosis         Physical Exam:     Vitals:   Blood Pressure: 117/64 (07/21/22 1400)  Pulse: 98 (07/21/22 1400)  Temperature: 97 9 °F (36 6 °C) (07/21/22 1108)  Temp Source: Oral (07/21/22 1108)  Respirations: 18 (07/21/22 1400)  Weight - Scale: 49 6 kg (109 lb 5 6 oz) (07/21/22 1108)  SpO2: 96 % (07/21/22 1400)    Physical Exam  Constitutional:       General: She is not in acute distress  Appearance: Normal appearance   She is underweight  She is not ill-appearing or diaphoretic  HENT:      Head: Normocephalic and atraumatic  Mouth/Throat:      Mouth: Mucous membranes are moist    Eyes:      General: No scleral icterus  Pupils: Pupils are equal, round, and reactive to light  Cardiovascular:      Rate and Rhythm: Tachycardia present  Rhythm irregularly irregular  Pulses: Normal pulses  Heart sounds: S1 normal and S2 normal  Murmur heard  No systolic murmur is present  No diastolic murmur is present  No gallop  No S3 or S4 sounds  Pulmonary:      Effort: Pulmonary effort is normal  No accessory muscle usage or respiratory distress  Breath sounds: Normal breath sounds  No stridor  No decreased breath sounds, wheezing, rhonchi or rales  Chest:      Chest wall: No tenderness  Abdominal:      General: Bowel sounds are normal  There is no distension  Palpations: Abdomen is soft  Tenderness: There is no abdominal tenderness  There is no guarding  Musculoskeletal:      Right lower leg: No edema  Left lower leg: No edema  Skin:     General: Skin is warm and dry  Coloration: Skin is not jaundiced  Neurological:      General: No focal deficit present  Mental Status: She is alert  Mental status is at baseline  Motor: No tremor or seizure activity  Psychiatric:         Behavior: Behavior is cooperative            Additional Data:     Lab Results:  Results from last 7 days   Lab Units 07/21/22  1128   WBC Thousand/uL 8 16   HEMOGLOBIN g/dL 11 4*   HEMATOCRIT % 34 1*   PLATELETS Thousands/uL 318   NEUTROS PCT % 77*   LYMPHS PCT % 12*   MONOS PCT % 9   EOS PCT % 1     Results from last 7 days   Lab Units 07/21/22  1128   SODIUM mmol/L 129*   POTASSIUM mmol/L 4 3   CHLORIDE mmol/L 94*   CO2 mmol/L 26   BUN mg/dL 16   CREATININE mg/dL 0 95   ANION GAP mmol/L 9   CALCIUM mg/dL 9 5   ALBUMIN g/dL 3 9   TOTAL BILIRUBIN mg/dL 0 55   ALK PHOS U/L 72   ALT U/L 12   AST U/L 17   GLUCOSE RANDOM mg/dL 181*     Results from last 7 days   Lab Units 07/21/22  1128   INR  0 94                   Imaging: Personally reviewed the following imaging: chest xray  XR chest 1 view portable   Final Result by Kelly Good MD (07/21 9448)      Since July 8, 2022, resolved or decreased pulmonary vascular congestion with possible small left pleural effusion  Workstation performed: [id number]             EKG and Other Studies Reviewed on Admission:   · EKG: Atrial fibrillation   BPM    · CXR: Resolved or decreased pulmonary vascular congestion with possible small left pleural effusion     ** Please Note: This note has been constructed using a voice recognition system   **

## 2022-07-21 NOTE — ASSESSMENT & PLAN NOTE
Lab Results   Component Value Date    HGBA1C 6 3 05/03/2022       No results for input(s): POCGLU in the last 72 hours      Blood Sugar Average: Last 72 hrs:  · Recent A1c controlled   · Hold Metformin  · SSI Algorithm with meals and bedtime  · QID accuchecks

## 2022-07-21 NOTE — ED PROVIDER NOTES
History  Chief Complaint   Patient presents with    Shortness of Breath     Tachycardia and shortness of breath reported       History provided by:  Patient  Shortness of Breath  Severity:  Moderate  Onset quality:  Gradual  Duration:  1 week  Timing:  Intermittent  Progression:  Waxing and waning  Chronicity:  New  Context comment:  Generalized weakness and shortness of breath intermittently over the past week possibly longer  Relieved by:  None tried  Worsened by:  Nothing  Ineffective treatments:  None tried  Associated symptoms: no abdominal pain, no chest pain, no cough, no diaphoresis, no fever, no headaches, no neck pain, no rash, no sore throat, no vomiting and no wheezing        Prior to Admission Medications   Prescriptions Last Dose Informant Patient Reported? Taking?    ALPRAZolam (XANAX) 0 25 mg tablet Past Week at Unknown time  No Yes   Sig: TAKE ONE TABLET BY MOUTH EVERY DAY AS NEEDED   Blood Glucose Monitoring Suppl (OneTouch Verio Reflect) w/Device KIT   No No   Sig: uad once daily   Lancets (freestyle) lancets   No No   Sig: TEST BLOOD SUGAR ONCE IN THE MORNING   amLODIPine (NORVASC) 2 5 mg tablet   No No   Sig: TAKE ONE TABLET BY MOUTH EVERY MORNING AND ONE TABLET BY MOUTH EVERY EVENING   atorvastatin (LIPITOR) 40 mg tablet   No No   Sig: TAKE ONE TABLET BY MOUTH EVERY DAY   betamethasone valerate (VALISONE) 0 1 % cream Not Taking at Unknown time  No No   Sig: Apply topically 2 (two) times a day   Patient not taking: Reported on 7/21/2022   cholecalciferol (VITAMIN D3) 1,000 units tablet   Yes No   Sig: Take 1,000 Units by mouth daily   donepezil (ARICEPT) 10 mg tablet   No No   Sig: TAKE ONE TABLET BY MOUTH AT BEDTIME   fluocinolone acetonide (DERMOTIC) 0 01 % otic oil Past Month at Unknown time  No Yes   Sig: Administer 5 drops into both ears 2 (two) times a day as needed (itching)   furosemide (LASIX) 40 mg tablet   No No   Sig: Take 1 tablet (40 mg total) by mouth daily   glucose blood (OneTouch Verio) test strip   No No   Sig: Use as instructed once daily   levothyroxine 25 mcg tablet   No No   Sig: TAKE 2 TABLETS BY MOUTH daily other than M and W which you take 1 daily   lisinopril (ZESTRIL) 20 mg tablet   No No   Sig: TAKE ONE TABLET BY MOUTH EVERY DAY   metFORMIN (GLUCOPHAGE-XR) 500 mg 24 hr tablet   No No   Sig: TAKE 2 TABLETS BY MOUTH TWICE A DAY WITH MEALS   neomycin-polymyxin-hydrocortisone (CORTISPORIN) 0 35%-10,000 units/mL-1% otic suspension   No No   Sig: Administer 4 drops to the right ear 2 (two) times a day for 10 days   potassium chloride (K-DUR,KLOR-CON) 20 mEq tablet   No No   Sig: Take 1 tablet (20 mEq total) by mouth daily for 5 days   sertraline (Zoloft) 25 mg tablet   No No   Sig: Take 1 tablet (25 mg total) by mouth in the morning  vitamin B-12 (VITAMIN B-12) 1,000 mcg tablet   Yes No   Sig: Take by mouth daily      Facility-Administered Medications: None       Past Medical History:   Diagnosis Date    Anxiety     Coronary artery disease     as per stefani    Diabetes ([facility] [address] )     as per stefani    Disease of thyroid gland     Heart attack ([facility] [address] )     as per stefani    High cholesterol     as per [employer] [employer]    HL (hearing loss)     Wears hearing aid, right    Hyperlipidemia     Hyperlipidemia     as per [email or web address]    Hypertension     as per [email or web address]       Past Surgical History:   Procedure Laterality Date    ADENOIDECTOMY      APPENDECTOMY      as per stefani    EYE SURGERY Bilateral 2015    as per [employer]    STAPEDECTOMY      Metal STAPES LEFT ear (can not have an MRI)- as per Mathias Duane TONSILLECTOMY      as per [email or web address]       Family History   Problem Relation Age of Onset    Parkinsonism Mother     Heart attack Father     Coronary artery disease Sister         arteriosclerosis       I have reviewed and agree with the history as documented      E-Cigarette/Vaping    E-Cigarette Use Never User      E-Cigarette/Vaping Substances    Nicotine No     THC No     CBD No     Flavoring No     Other No     Unknown No      Social History     Tobacco Use    Smoking status: Never Smoker    Smokeless tobacco: Never Used   Vaping Use    Vaping Use: Never used   Substance Use Topics    Alcohol use: Yes     Comment: occasional     Drug use: Never       Review of Systems   Constitutional: Positive for activity change and appetite change  Negative for chills, diaphoresis and fever  HENT: Negative for congestion, sinus pressure and sore throat  Eyes: Negative for pain and visual disturbance  Respiratory: Positive for shortness of breath  Negative for cough, chest tightness, wheezing and stridor  Cardiovascular: Positive for palpitations  Negative for chest pain  Gastrointestinal: Negative for abdominal distention, abdominal pain, constipation, diarrhea, nausea and vomiting  Genitourinary: Negative for dysuria and frequency  Musculoskeletal: Negative for neck pain and neck stiffness  Skin: Negative for rash  Neurological: Negative for dizziness, speech difficulty, light-headedness, numbness and headaches  Physical Exam  Physical Exam  Vitals reviewed  Constitutional:       General: She is in acute distress  Appearance: She is well-developed  She is not diaphoretic  HENT:      Head: Normocephalic and atraumatic  Right Ear: External ear normal       Left Ear: External ear normal       Nose: Nose normal    Eyes:      General:         Right eye: No discharge  Left eye: No discharge  Pupils: Pupils are equal, round, and reactive to light  Neck:      Trachea: No tracheal deviation  Cardiovascular:      Rate and Rhythm: Tachycardia present  Rhythm irregular  Heart sounds: Normal heart sounds  No murmur heard  Comments: Heart rate 150-190, atrial fibrillation on the monitor  Pulmonary:      Effort: Pulmonary effort is normal  No respiratory distress  Breath sounds: Normal breath sounds  No stridor     Abdominal:      General: There is no distension  Palpations: Abdomen is soft  Tenderness: There is no abdominal tenderness  There is no guarding or rebound  Musculoskeletal:         General: Normal range of motion  Cervical back: Normal range of motion and neck supple  Skin:     General: Skin is warm and dry  Coloration: Skin is not pale  Findings: No erythema  Neurological:      General: No focal deficit present  Mental Status: She is alert and oriented to person, place, and time           Vital Signs  ED Triage Vitals [07/21/22 1108]   Temperature Pulse Respirations Blood Pressure SpO2   97 9 °F (36 6 °C) (!) 146 18 130/70 94 %      Temp Source Heart Rate Source Patient Position - Orthostatic VS BP Location FiO2 (%)   Oral Monitor Lying Right arm --      Pain Score       No Pain           Vitals:    07/21/22 1542 07/21/22 1612 07/21/22 1734 07/21/22 1806   BP: 100/67 104/50 116/64 100/64   Pulse: 94      Patient Position - Orthostatic VS:             Visual Acuity      ED Medications  Medications   diltiazem (CARDIZEM) 125 mg in sodium chloride 0 9 % 125 mL infusion (10 mg/hr Intravenous Rate/Dose Change 7/21/22 1801)   donepezil (ARICEPT) tablet 10 mg (has no administration in time range)   ALPRAZolam (XANAX) tablet 0 25 mg (has no administration in time range)   atorvastatin (LIPITOR) tablet 40 mg (40 mg Oral Given 7/21/22 1735)   cholecalciferol (VITAMIN D3) tablet 1,000 Units (1,000 Units Oral Given 7/21/22 1500)   furosemide (LASIX) tablet 40 mg (has no administration in time range)   levothyroxine tablet 50 mcg (has no administration in time range)   apixaban (ELIQUIS) tablet 2 5 mg (2 5 mg Oral Given 7/21/22 1735)   ondansetron (ZOFRAN) injection 4 mg (has no administration in time range)   metoprolol tartrate (LOPRESSOR) tablet 25 mg (has no administration in time range)   diltiazem (CARDIZEM) injection 10 mg (10 mg Intravenous Given 7/21/22 1141)   diltiazem (CARDIZEM) 125 mg in sodium chloride 0 9 % 125 mL infusion (0 mg/hr Intravenous Stopped 7/21/22 1512)   sodium chloride 0 9 % bolus 500 mL (0 mL Intravenous Stopped 7/21/22 1512)       Diagnostic Studies  Results Reviewed     Procedure Component Value Units Date/Time    HS Troponin I 4hr [[id number]]  (Abnormal) Collected: 07/21/22 1723    Lab Status: Final result Specimen: Blood from Arm, Left Updated: 07/21/22 1805     hs TnI 4hr 147 ng/L      Delta 4hr hsTnI 115 ng/L     TSH, 3rd generation [[id number]]  (Normal) Collected: 07/21/22 1128    Lab Status: Final result Specimen: Blood from Arm, Right Updated: 07/21/22 1557     TSH 3RD GENERATON 1 630 uIU/mL     Narrative:      Patients undergoing fluorescein dye angiography may retain small amounts of fluorescein in the body for 48-72 hours post procedure  Samples containing fluorescein can produce falsely depressed TSH values  If the patient had this procedure,a specimen should be resubmitted post fluorescein clearance  Uric acid [[id number]]  (Normal) Collected: 07/21/22 1128    Lab Status: Final result Specimen: Blood from Arm, Right Updated: 07/21/22 1529     Uric Acid 7 4 mg/dL     Osmolality-"If this is regarding a toxic alcohol, STOP  Test is not routinely indicated   Please consult medical  on call for further guidance " [[id number]]  (Abnormal) Collected: 07/21/22 1128    Lab Status: Final result Specimen: Blood from Arm, Right Updated: 07/21/22 1518     Osmolality Serum 275 mmol/KG     Sodium, urine, random [[id number]]     Lab Status: No result Specimen: Urine     Osmolality, urine [[id number]]     Lab Status: No result Specimen: Urine     Urinalysis with microscopic [[id number]]     Lab Status: No result Specimen: Urine     HS Troponin I 2hr [[id number]]  (Abnormal) Collected: 07/21/22 1350    Lab Status: Final result Specimen: Blood from Hand, Left Updated: 07/21/22 1424     hs TnI 2hr 79 ng/L      Delta 2hr hsTnI 47 ng/L     B-Type Natriuretic Peptide(BNP) AN, CA, EA Campuses Only [[id number]]  (Abnormal) Collected: 07/21/22 1229    Lab Status: Final result Specimen: Blood Updated: 07/21/22 1307      pg/mL     TSH, 3rd generation with Free T4 reflex [[id number]]  (Normal) Collected: 07/21/22 1128    Lab Status: Final result Specimen: Blood from Arm, Right Updated: 07/21/22 1307     TSH 3RD GENERATON 1 753 uIU/mL     Narrative:      Patients undergoing fluorescein dye angiography may retain small amounts of fluorescein in the body for 48-72 hours post procedure  Samples containing fluorescein can produce falsely depressed TSH values  If the patient had this procedure,a specimen should be resubmitted post fluorescein clearance        HS Troponin 0hr (reflex protocol) [[id number]]  (Normal) Collected: 07/21/22 1128    Lab Status: Final result Specimen: Blood from Arm, Right Updated: 07/21/22 1207     hs TnI 0hr 32 ng/L     Comprehensive metabolic panel [[id number]]  (Abnormal) Collected: 07/21/22 1128    Lab Status: Final result Specimen: Blood from Arm, Right Updated: 07/21/22 1159     Sodium 129 mmol/L      Potassium 4 3 mmol/L      Chloride 94 mmol/L      CO2 26 mmol/L      ANION GAP 9 mmol/L      BUN 16 mg/dL      Creatinine 0 95 mg/dL      Glucose 181 mg/dL      Calcium 9 5 mg/dL      AST 17 U/L      ALT 12 U/L      Alkaline Phosphatase 72 U/L      Total Protein 6 8 g/dL      Albumin 3 9 g/dL      Total Bilirubin 0 55 mg/dL      eGFR 52 ml/min/1 73sq m     Narrative:      Min guidelines for Chronic Kidney Disease (CKD):     Stage 1 with normal or high GFR (GFR > 90 mL/min/1 73 square meters)    Stage 2 Mild CKD (GFR = 60-89 mL/min/1 73 square meters)    Stage 3A Moderate CKD (GFR = 45-59 mL/min/1 73 square meters)    Stage 3B Moderate CKD (GFR = 30-44 mL/min/1 73 square meters)    Stage 4 Severe CKD (GFR = 15-29 mL/min/1 73 square meters)    Stage 5 End Stage CKD (GFR <15 mL/min/1 73 square meters)  Note: GFR calculation is accurate only with a steady state creatinine    Protime-INR [[id number]]  (Normal) Collected: 07/21/22 1128    Lab Status: Final result Specimen: Blood from Arm, Right Updated: 07/21/22 1158     Protime 12 6 seconds      INR 0 94    APTT [[id number]]  (Normal) Collected: 07/21/22 1128    Lab Status: Final result Specimen: Blood from Arm, Right Updated: 07/21/22 1158     PTT 35 seconds     CBC and differential [[id number]]  (Abnormal) Collected: 07/21/22 1128    Lab Status: Final result Specimen: Blood from Arm, Right Updated: 07/21/22 1146     WBC 8 16 Thousand/uL      RBC 3 64 Million/uL      Hemoglobin 11 4 g/dL      Hematocrit 34 1 %      MCV 94 fL      MCH 31 3 pg      MCHC 33 4 g/dL      RDW 13 4 %      MPV 11 3 fL      Platelets 870 Thousands/uL      nRBC 0 /100 WBCs      Neutrophils Relative 77 %      Immat GRANS % 0 %      Lymphocytes Relative 12 %      Monocytes Relative 9 %      Eosinophils Relative 1 %      Basophils Relative 1 %      Neutrophils Absolute 6 27 Thousands/µL      Immature Grans Absolute 0 02 Thousand/uL      Lymphocytes Absolute 0 99 Thousands/µL      Monocytes Absolute 0 76 Thousand/µL      Eosinophils Absolute 0 06 Thousand/µL      Basophils Absolute 0 06 Thousands/µL                  XR chest 1 view portable   Final Result by Luis Armando Dumont MD (07/21 9813)      Since July 8, 2022, resolved or decreased pulmonary vascular congestion with possible small left pleural effusion  Workstation performed: [id number]                    Procedures  ECG 12 Lead Documentation Only    Date/Time: 7/21/2022 6:31 PM  Performed by: Yamilex Jimenez DO  Authorized by: Yamilex Jimenez DO     ECG reviewed by me, the ED Provider: yes    Patient location:  ED  Interpretation:     Interpretation: abnormal    Rate:     ECG rate:  147    ECG rate assessment: tachycardic    Rhythm:     Rhythm: atrial fibrillation    Ectopy:     Ectopy: none    QRS:     QRS axis:  Left    QRS intervals:   Wide  Conduction:     Conduction: abnormal      Abnormal conduction: complete LBBB    ST segments:     ST segments:  Non-specific  T waves:     T waves: non-specific    CriticalCare Time  Performed by: Jesi Lomas DO  Authorized by: [address], DO     Critical care provider statement:     Critical care time (minutes):  35    Critical care time was exclusive of:  Separately billable procedures and treating other patients    Critical care was necessary to treat or prevent imminent or life-threatening deterioration of the following conditions: Atrial fibrillation rapid ventricular response requiring Cardizem bolus and Cardizem infusion      Critical care was time spent personally by me on the following activities:  Obtaining history from patient or surrogate, development of treatment plan with patient or surrogate, discussions with consultants, evaluation of patient's response to treatment, examination of patient, ordering and performing treatments and interventions, ordering and review of laboratory studies, ordering and review of radiographic studies, re-evaluation of patient's condition and review of old charts             ED Course  ED Course as of 07/21/22 1832   Thu Jul 21, 2022   1208 Multiple repeat evaluations, heart rate significantly decreasing, now atrial fibrillation, 100-120                                             MDM  Number of Diagnoses or Management Options  Atrial fibrillation with rapid ventricular response [facility]): new and requires workup  Diagnosis management comments:       AFib with RVR, rate controlled with Cardizem drip admitted to Internal Medicine Service       Amount and/or Complexity of Data Reviewed  Clinical lab tests: ordered and reviewed  Tests in the radiology section of CPT®: ordered and reviewed  Decide to obtain previous medical records or to obtain history from someone other than the patient: yes  Obtain history from someone other than the patient: yes  Review and summarize past medical records: yes  Discuss the patient with other providers: yes  Independent visualization of images, tracings, or specimens: yes        Disposition  Final diagnoses:   Atrial fibrillation with rapid ventricular response ([facility] [address] )     Time reflects when diagnosis was documented in both MDM as applicable and the Disposition within this note     Time User Action Codes Description Comment    7/21/2022  1:15 PM Lalita Arambulayer [[id number]] Atrial fibrillation with rapid ventricular response ([facility] [address] )     7/21/2022  2:16 PM Warden Moses Leela [[id number]] New onset atrial fibrillation [facility])       ED Disposition     ED Disposition   Admit    Condition   Stable    Date/Time   u Jul 21, 2022 12:15 PM    Comment   Case was discussed with Dr Jaspal Lindo  and the patient's admission status was agreed to be Admission Status: inpatient status to the service of Dr Jaspal Lindo   Follow-up Information    None         Current Discharge Medication List      CONTINUE these medications which have NOT CHANGED    Details   ALPRAZolam (XANAX) 0 25 mg tablet TAKE ONE TABLET BY MOUTH EVERY DAY AS NEEDED  Qty: 30 tablet, Refills: 5    Associated Diagnoses: Anxiety      fluocinolone acetonide (DERMOTIC) 0 01 % otic oil Administer 5 drops into both ears 2 (two) times a day as needed (itching)  Qty: 20 mL, Refills: 3    Associated Diagnoses: Otalgia of right ear      amLODIPine (NORVASC) 2 5 mg tablet TAKE ONE TABLET BY MOUTH EVERY MORNING AND ONE TABLET BY MOUTH EVERY EVENING  Qty: 180 tablet, Refills: 0    Associated Diagnoses: Essential hypertension      atorvastatin (LIPITOR) 40 mg tablet TAKE ONE TABLET BY MOUTH EVERY DAY  Qty: 90 tablet, Refills: 3    Associated Diagnoses: Type 2 diabetes mellitus without complication, without long-term current use of insulin ([facility] [address] );  Essential hypertension      betamethasone valerate (VALISONE) 0 1 % cream Apply topically 2 (two) times a day  Qty: 30 g, Refills: 1    Associated Diagnoses: Otalgia of right ear      Blood Glucose Monitoring Suppl (OneTouch Verio Reflect) w/Device KIT uad once daily  Qty: 1 kit, Refills: 0    Associated Diagnoses: Type 2 diabetes mellitus without complication, without long-term current use of insulin ([facility])      cholecalciferol (VITAMIN D3) 1,000 units tablet Take 1,000 Units by mouth daily      donepezil (ARICEPT) 10 mg tablet TAKE ONE TABLET BY MOUTH AT BEDTIME  Qty: 90 tablet, Refills: 1    Associated Diagnoses: Dementia without behavioral disturbance, unspecified dementia type ([facility])      furosemide (LASIX) 40 mg tablet Take 1 tablet (40 mg total) by mouth daily  Qty: 30 tablet, Refills: 0    Associated Diagnoses: Acute on chronic diastolic congestive heart failure ([facility])      glucose blood (OneTouch Verio) test strip Use as instructed once daily  Qty: 100 strip, Refills: 11    Associated Diagnoses: Type 2 diabetes mellitus without complication, without long-term current use of insulin ([facility])      Lancets (freestyle) lancets TEST BLOOD SUGAR ONCE IN THE MORNING  Qty: 100 each, Refills: 11    Associated Diagnoses: Type 2 diabetes mellitus without complication, without long-term current use of insulin ([facility])      levothyroxine 25 mcg tablet TAKE 2 TABLETS BY MOUTH daily other than M and W which you take 1 daily  Qty: 180 tablet, Refills: 1    Associated Diagnoses: Other specified hypothyroidism      lisinopril (ZESTRIL) 20 mg tablet TAKE ONE TABLET BY MOUTH EVERY DAY  Qty: 90 tablet, Refills: 0    Associated Diagnoses: Essential hypertension      metFORMIN (GLUCOPHAGE-XR) 500 mg 24 hr tablet TAKE 2 TABLETS BY MOUTH TWICE A DAY WITH MEALS  Qty: 360 tablet, Refills: 0    Associated Diagnoses: Type 2 diabetes mellitus without complication, without long-term current use of insulin ([facility])      neomycin-polymyxin-hydrocortisone (CORTISPORIN) 0 35%-10,000 units/mL-1% otic suspension Administer 4 drops to the right ear 2 (two) times a day for 10 days  Qty: 10 mL, Refills: 0    Associated Diagnoses: Otalgia of right ear potassium chloride (K-DUR,KLOR-CON) 20 mEq tablet Take 1 tablet (20 mEq total) by mouth daily for 5 days  Qty: 5 tablet, Refills: 0    Associated Diagnoses: Acute on chronic diastolic congestive heart failure (HCC)      sertraline (Zoloft) 25 mg tablet Take 1 tablet (25 mg total) by mouth in the morning  Qty: 30 tablet, Refills: 5    Associated Diagnoses: Dementia without behavioral disturbance, unspecified dementia type ([facility] [address] ); Anxiety      vitamin B-12 (VITAMIN B-12) 1,000 mcg tablet Take by mouth daily             No discharge procedures on file      PDMP Review       Value Time User    PDMP Reviewed  Yes 12/7/2020  8:39 AM Erin [facility]          ED Provider  Electronically Signed by           Josh Philip DO  07/21/22 2581

## 2022-07-21 NOTE — ASSESSMENT & PLAN NOTE
· POA, patient was shaky and clammy today  Found to be in new onset A  Fib with initial  on EKG   Started on Cardizem with good response   · Suspect related to advanced age, valvular disease, as well as her Nadolol being stopped last hospitalization (on this for HTN per patient)  · [id number] = [id number]   · Admit patient to med/surg under inpatient status   · Cardiology consult appreciated   · Continue Cardizem drip   · Start Metoprolol 25 mg BID   · Eliquis 5 mg BID for anticoagulation   · Check TSH   · Keep K and Mg above 4 and 2 respectively   · Telemetry  · No need to repeat ECHO

## 2022-07-21 NOTE — ASSESSMENT & PLAN NOTE
· ? remote MI history, but no stenting/angioplasty   · Continue statin   · Will be on beta blocker now for rate control

## 2022-07-21 NOTE — CONSULTS
Consultation - Cardiology Team One  Anita Canchola 80 y o  female MRN: [id number]  Unit/Bed#: ED 29 Encounter: [id number]    Inpatient consult to Cardiology  Consult performed by: BLANCA Villarreal  Consult ordered by: Nohelia Levi PA-C      Physician Requesting Consult: Ab Thomas*  Reason for Consult / Principal Problem: new onset atrial fibrillation    Assessment/ Plan    1  New onset atrial fibrillation with RVR  Presented with weakness and SOB  ECG- Afib with RVR, LBBB,   Tele reviewed- Afib, rates initially 150s, now 70s-90s  Given 10 mg IV diltiazem and started on a gtt  Infusing at 15 mg/hr at time of exam but turned down to 12 5 mg/hr due to well controlled heart rates  Metoprolol tartrate 25 mg BID started by primary team, not given a dose yet- will increase to 25 mg q6 hours and try to wean off diltiazem gtt  Hopefully will convert to NSR overnight  [id number] 6-7 (questionable hx of MI, age, HTN, HF, diabetes, gender)  Patient agreeable with anticoagulation  Agree with Eliquis 2 5 mg BID  Recent echo showed preserved LV function with critical AS and mod to severe MR     2  Critical AS  Patient and family interested in TAVR  Needs outpatient follow up with CT surgery, will message valve coordinator to help expedite  3  Moderate to severe MR- continue current medications    4  Chronic diastolic CHF  Euvolemic on exam  Weight: 109 lb- 2 lb less than recent discharge weight  Continue Lasix 40 mg PO daily  Follow I/Os, daily weights, AM BMP    5  HTN- low normal  Holding home amlodipine and lisinopril  Recommend avoiding lisinopril due to critical AS  Monitor BP on metoprolol and cardizem gtt  6  HLD- on atorvastatin 40 mg daily    7  Reported MI at age 27  Unclear details  No reported stents or intervention  No anginal complaints, continue statin and beta blocker    8  Type 2 DM- A1c 6 3%, management per primary team    9  Hypothyroidism- TSH WNL    10   Hyponatremia- Na 129  500 mL NSS bolus  Am BMP    11  Non ischemic myocardial injury in setting of atrial fibrillation with RVR  Troponin 32-->79  ECG- Afib with RVR, LBBB (chronic)    No anginal complaints  History of Present Illness   HPI: Gisselle Cotton is a 80y o  year old female with severe AS, HTN, HLD, remote MI at age of 27, chronic diastolic CHF, chronic LBBB, type 2 DM, and hypothyroidism  She previously followed with cardiologist Dr Amaris Ramon but plans to see Dr Román Cabrera after her recent hospitalization  She was recently hospitalized at [facility] from 7/8-7/10 with acute CHF exacerbation  She was discharged on lasix 40 mg daily at a weight of 111 lb  Her previously prescribed nadolol was stopped due to bradycardia  An echocardiogram completed during that hospital stay showed critical AS with preserved LV function EF 55% and moderate to severe MR  She was supposed to follow up in the office today with Aster Li but her family already rescheduled this to next Friday  She woke up this morning around 0600 feeling weak and unwell  She had some mild SOB but denies dizziness or near syncope  She also denies chest pain  She has not been weighing herself at home but notes she had been doing well since her recent discharge and had no LE edema  She has also been compliant with Lasix  Her ECG showed afib with RVR at a rate of 147  Telemetry shows afib with rates initially in the 150s but down to the 70s-90s at time of my exam  She was given 500 mL NSS bolus and 10 mg IV diltiazem with initiation of a gtt currently running at 15 mg/hr  The medicine team has also started metoprolol tartrate 25 mg BID but the first dose hasn't been given yet  Cardiology consulted for further evaluation and management of her new onset afib   (from 343 on 7/8), hs troponin 32-->79, Na 129, K 4 3, Creatinine 0 95  CXR shows improved pulmonary vascular congestion compared to study from 7/8       EKG reviewed personally: Atrial fibrillation with RVR, LBBB      Telemetry reviewed personally: Atrial fibrillation, rates initially in the 150s but more recently in the 70s-90s    Review of Systems   Constitutional: Negative for chills, malaise/fatigue and weight gain  Cardiovascular: Negative for chest pain, dyspnea on exertion, leg swelling, orthopnea, palpitations and syncope  Respiratory: Positive for shortness of breath  Negative for cough, sleep disturbances due to breathing and sputum production  Gastrointestinal: Negative for bloating, nausea and vomiting  Neurological: Positive for weakness  Negative for dizziness and light-headedness  Psychiatric/Behavioral: The patient is nervous/anxious  All other systems reviewed and are negative      Historical Information   Past Medical History:   Diagnosis Date    Anxiety     Coronary artery disease     as per [place]    Diabetes ([facility] )     as per [place]    Disease of thyroid gland     Heart attack ([facility] )     as per [place]    High cholesterol     as per [place]    HL (hearing loss)     Wears hearing aid, right    Hyperlipidemia     Hyperlipidemia     as per [place]    Hypertension     as per [place]     Past Surgical History:   Procedure Laterality Date    ADENOIDECTOMY      APPENDECTOMY      as per [place]    EYE SURGERY Bilateral 2015    as per [place]    STAPEDECTOMY      Metal STAPES LEFT ear (can not have an MRI)- as per [place] TONSILLECTOMY      as per [place]     Social History     Substance and Sexual Activity   Alcohol Use Yes    Comment: occasional      Social History     Substance and Sexual Activity   Drug Use Never     Social History     Tobacco Use   Smoking Status Never Smoker   Smokeless Tobacco Never Used     Family History:   Family History   Problem Relation Age of Onset    Parkinsonism Mother     Heart attack Father     Coronary artery disease Sister         arteriosclerosis       Meds/Allergies   all current active meds have been reviewed and current meds:   Current Facility-Administered Medications   Medication Dose Route Frequency    ALPRAZolam (XANAX) tablet 0 25 mg  0 25 mg Oral Daily PRN    apixaban (ELIQUIS) tablet 2 5 mg  2 5 mg Oral BID    atorvastatin (LIPITOR) tablet 40 mg  40 mg Oral Daily With [place]    cholecalciferol (VITAMIN D3) tablet 1,000 Units  1,000 Units Oral Daily    diltiazem (CARDIZEM) 125 mg in sodium chloride 0 9 % 125 mL infusion  1-15 mg/hr Intravenous Continuous    donepezil (ARICEPT) tablet 10 mg  10 mg Oral HS    [START ON 7/22/2022] furosemide (LASIX) tablet 40 mg  40 mg Oral Daily    [START ON 7/22/2022] levothyroxine tablet 50 mcg  50 mcg Oral Once per day on Sun Tue Thu Fri Sat    metoprolol tartrate (LOPRESSOR) tablet 25 mg  25 mg Oral BID    ondansetron (ZOFRAN) injection 4 mg  4 mg Intravenous Q6H PRN     diltiazem, 1-15 mg/hr      No Known Allergies    Objective   Vitals: Blood pressure 117/64, pulse 98, temperature 97 9 °F (36 6 °C), temperature source Oral, resp  rate 18, weight 49 6 kg (109 lb 5 6 oz), SpO2 96 %  , Body mass index is 21 36 kg/m²  ,     Systolic (30UXV), SSP:870 , Min:98 , LRT:347     Diastolic (13UPG), YJQ:44, Min:54, Max:73      No intake or output data in the 24 hours ending 07/21/22 1459  Wt Readings from Last 3 Encounters:   07/21/22 49 6 kg (109 lb 5 6 oz)   07/09/22 50 4 kg (111 lb 1 8 oz)   05/03/22 52 2 kg (115 lb)     Invasive Devices  Report    Peripheral Intravenous Line  Duration           Peripheral IV 07/21/22 Left Wrist <1 day    Peripheral IV 07/21/22 Right Wrist <1 day              Physical Exam  Vitals reviewed  Constitutional:       General: She is not in acute distress  HENT:      Ears:      Comments: [place]  Neck:      Vascular: No hepatojugular reflux or JVD  Cardiovascular:      Rate and Rhythm: Normal rate  Rhythm irregularly irregular  Heart sounds: Murmur heard  Systolic murmur is present with a grade of 3/6  No friction rub  No gallop  Pulmonary:      Effort: Pulmonary effort is normal  No respiratory distress  Breath sounds: No rales  Abdominal:      General: Bowel sounds are normal  There is no distension  Palpations: Abdomen is soft  Tenderness: There is no abdominal tenderness  Musculoskeletal:         General: No tenderness  Normal range of motion  Cervical back: Neck supple  Right lower leg: No edema  Left lower leg: No edema  Skin:     General: Skin is warm and dry  Capillary Refill: Capillary refill takes less than 2 seconds  Findings: No erythema  Neurological:      Mental Status: She is alert and oriented to person, place, and time     Psychiatric:         Mood and Affect: Mood normal      LABORATORY RESULTS:      CBC with diff:   Results from last 7 days   Lab Units 07/21/22  1128   WBC Thousand/uL 8 16   HEMOGLOBIN g/dL 11 4*   HEMATOCRIT % 34 1*   MCV fL 94   PLATELETS Thousands/uL 318   MCH pg 31 3   MCHC g/dL 33 4   RDW % 13 4   MPV fL 11 3   NRBC AUTO /100 WBCs 0       CMP:  Results from last 7 days   Lab Units 07/21/22  1128   POTASSIUM mmol/L 4 3   CHLORIDE mmol/L 94*   CO2 mmol/L 26   BUN mg/dL 16   CREATININE mg/dL 0 95   CALCIUM mg/dL 9 5   AST U/L 17   ALT U/L 12   ALK PHOS U/L 72   EGFR ml/min/1 73sq m 52     BMP:  Results from last 7 days   Lab Units 07/21/22  1128   POTASSIUM mmol/L 4 3   CHLORIDE mmol/L 94*   CO2 mmol/L 26   BUN mg/dL 16   CREATININE mg/dL 0 95   CALCIUM mg/dL 9 5     Lab Results   Component Value Date    CREATININE 0 95 07/21/2022    CREATININE 1 03 07/14/2022    CREATININE 0 79 07/10/2022       Lab Results   Component Value Date    NTBNP 3,327 (H) 06/29/2022    NTBNP 1,239 (H) 08/30/2021    NTBNP 2,381 (H) 01/21/2021     Results from last 7 days   Lab Units 07/21/22  1128   TSH 3RD GENERATON uIU/mL 1 753     Results from last 7 days   Lab Units 07/21/22  1128   INR  0 94     Lipid Profile:   No results found for: CHOL  Lab Results   Component Value Date HDL 58 2022    HDL 56 2021    HDL 59 2020     Lab Results   Component Value Date    LDLCALC 50 2022    LDLCALC 88 2021    LDLCALC 73 2020     Lab Results   Component Value Date    TRIG 79 2022    TRIG 106 2021    TRIG 95 2020     Cardiac testing:   Results for orders placed during the hospital encounter of 21    Echo complete with contrast if indicated    Narrative  [facility]note 3] Medical [address]    Transthoracic Echocardiogram  2D, M-mode, Doppler, and Color Doppler    Study date:  08-Mar-2021    Patient: Becky Henning  MR number: [id number]  Account number: [de-identified]  : 17-Sep-1929  Age: 80 years  Gender: Female  Status: Outpatient  Location: [facility]  Height: 60 in  Weight: 134 lb  BP: 126/ 82 mmHg    Indications: Brain peptide    Diagnoses: R79 89 - Other specified abnormal findings of blood chemistry    Sonographer:  KIMMIE Gary  Referring Physician:  Amadeo Romero MD  Group:  [facility]  Interpreting Physician:  Guy Sinclair MD    SUMMARY    LEFT VENTRICLE:  Systolic function was normal  Ejection fraction was estimated to be 60 %  There were no regional wall motion abnormalities  Features were consistent with a pseudonormal left ventricular filling pattern, with concomitant abnormal relaxation and increased filling pressure (grade 2 diastolic dysfunction)  Doppler parameters were consistent with high ventricular filling pressure  VENTRICULAR SEPTUM:  There was dyssynergic motion  There was sigmoid septal appearance  These changes are consistent with a conduction abnormality or paced rhythm  LEFT ATRIUM:  The atrium was dilated  ATRIAL SEPTUM:  There was a small patent foramen ovale  MITRAL VALVE:  There was moderate annular calcification  There was moderate to severe regurgitation  AORTIC VALVE:  The valve was trileaflet   Leaflets exhibited moderate calcification and moderately reduced cuspal separation  Transaortic velocity was increased due to valvular stenosis  There was moderate stenosis  There was mild regurgitation  Valve mean gradient was 30 mmHg  TRICUSPID VALVE:  There was mild regurgitation  Estimated peak PA pressure was 54 mmHg  The findings suggest moderate pulmonary hypertension  HISTORY: PRIOR HISTORY: DM, HTN, HLD, hypothyroid, bilateral carotid stenosis, CAD, MI    PROCEDURE: The study was performed in the [facility]  This was a routine study  The transthoracic approach was used  The study included complete 2D imaging, M-mode, complete spectral Doppler, and color Doppler  The heart rate was 79  bpm, at the start of the study  Images were obtained from the parasternal, apical, subcostal, and suprasternal notch acoustic windows  Echocardiographic views were limited due to lung interference  This was a technically difficult study  LEFT VENTRICLE: Size was normal  Systolic function was normal  Ejection fraction was estimated to be 60 %  There were no regional wall motion abnormalities  Wall thickness was normal  DOPPLER: The transmitral flow pattern was normal   Features were consistent with a pseudonormal left ventricular filling pattern, with concomitant abnormal relaxation and increased filling pressure (grade 2 diastolic dysfunction)  Doppler parameters were consistent with high ventricular  filling pressure  VENTRICULAR SEPTUM: There was dyssynergic motion  There was sigmoid septal appearance  These changes are consistent with a conduction abnormality or paced rhythm  RIGHT VENTRICLE: The size was normal  Systolic function was normal  Wall thickness was normal     LEFT ATRIUM: The atrium was dilated  ATRIAL SEPTUM: There was a small patent foramen ovale  RIGHT ATRIUM: Size was normal     MITRAL VALVE: There was moderate annular calcification   Valve structure was normal  There was normal leaflet separation  DOPPLER: The transmitral velocity was within the normal range  There was no evidence for stenosis  There was moderate  to severe regurgitation  AORTIC VALVE: The valve was trileaflet  Leaflets exhibited moderate calcification and moderately reduced cuspal separation  DOPPLER: Transaortic velocity was increased due to valvular stenosis  There was moderate stenosis  There was mild  regurgitation  TRICUSPID VALVE: The valve structure was normal  There was normal leaflet separation  DOPPLER: The transtricuspid velocity was within the normal range  There was no evidence for stenosis  There was mild regurgitation  Estimated peak PA  pressure was 54 mmHg  The findings suggest moderate pulmonary hypertension  PULMONIC VALVE: Leaflets exhibited normal thickness, no calcification, and normal cuspal separation  DOPPLER: The transpulmonic velocity was within the normal range  There was trace regurgitation  PERICARDIUM: There was no pericardial effusion  The pericardium was normal in appearance  AORTA: The root exhibited normal size  SYSTEMIC VEINS: IVC: The inferior vena cava was normal in size  Respirophasic changes were normal     MEASUREMENT TABLES    DOPPLER MEASUREMENTS  Aortic valve   (Reference normals)  Peak elaine   335 cm/s   (--)  Peak gradient   45 mmHg   (--)  Mean gradient   30 mmHg   (--)    SYSTEM MEASUREMENT TABLES    2D  %FS: 31 25 %  Ao Diam: 2 7 cm  EDV(Teich): 75 55 ml  EF(Teich): 59 49 %  ESV(Teich): 30 6 ml  IVSd: 1 52 cm  LA Area: 25 59 cm2  LA Diam: 4 52 cm  LVEDV MOD A4C: 51 69 ml  LVEF MOD A4C: 61 25 %  LVESV MOD A4C: 20 03 ml  LVIDd: 4 13 cm  LVIDs: 2 84 cm  LVLd A4C: 5 23 cm  LVLs A4C: 4 57 cm  LVOT Diam: 1 56 cm  LVPWd: 1 01 cm  RA Area: 9 94 cm2  RVIDd: 3 51 cm  RWT: 0 49  SV MOD A4C: 31 66 ml  SV(Teich): 44 95 ml    CW  AR Dec Dubois: 3 42 m/s2  AR Dec Time: 1068 78 ms  AR PHT: 309 94 ms  AR Vmax: 3 65 m/s  AR maxP 32 mmHg  AV Env  Ti: 324 77 ms  AV VTI: 84 46 cm  AV Vmax: 3 39 m/s  AV Vmean: 2 6 m/s  AV maxP 07 mmHg  AV meanP 55 mmHg  PRend PG: 10 01 mmHg  PRend Vmax: 1 58 m/s  PV Vmax: 0 76 m/s  PV maxP 3 mmHg  TR Vmax: 3 38 m/s  TR maxP 78 mmHg    MM  TAPSE: 1 56 cm    PW  MILE (VTI): 0 48 cm2  MILE Vmax: 0 51 cm2  E' Sept: 0 04 m/s  E/E' Sept: 33 58  LVOT Env  Ti: 336 82 ms  LVOT VTI: 21 46 cm  LVOT Vmax: 0 9 m/s  LVOT Vmean: 0 64 m/s  LVOT maxPG: 3 27 mmHg  LVOT meanP 86 mmHg  LVSV Dopp: 40 74 ml  MV A Jayesh: 1 26 m/s  MV Dec [place]: 5 74 m/s2  MV DecT: 228 08 ms  MV E Jayesh: 1 3 m/s  MV E/A Ratio: 1 03  MV PHT: 66 14 ms  MVA By PHT: 3 33 cm2    [employer] Accredited Echocardiography Laboratory    Prepared and electronically signed by    Fredrick Venegas MD  Signed 08-Mar-2021 15:24:54    Imaging: I have personally reviewed pertinent reports  and I have personally reviewed pertinent films in PACS  XR chest 1 view portable    Result Date: 2022  Narrative: CHEST INDICATION:   Shortness of breath, new onset AFib  COMPARISON:  Chest radiograph 2022 EXAM PERFORMED/VIEWS:  XR CHEST PORTABLE FINDINGS: Cardiomediastinal silhouette appears unremarkable  Pulmonary vascular congestion has decreased or resolved  Bibasilar linear atelectasis  Possible small left pleural effusion  No pneumothorax  Chronic right rib fracture deformities  No acute osseous abnormality  Impression: Since 2022, resolved or decreased pulmonary vascular congestion with possible small left pleural effusion  Workstation performed: [id number]     XR chest 1 view portable    Result Date: 2022  Narrative: CHEST INDICATION:   Concern for CHF exacerbation  Increased work of breathing on BiPAP  Lajean Cassette COMPARISON:  Chest radiograph 2019 EXAM PERFORMED/VIEWS:  XR CHEST PORTABLE FINDINGS: Cardiomediastinal silhouette appears unremarkable  New perihilar and peripheral opacities  Small bilateral pleural effusions  No pneumothorax   Osseous structures appear within normal limits for patient age  Impression: Pulmonary edema  Workstation performed: [id number]     Echo complete w/ contrast if indicated    Result Date: 7/8/2022  Narrative: Miles Kirby  Left Ventricle: Left ventricular cavity size is normal  Wall thickness is mildly increased  The left ventricular ejection fraction is 55%  Systolic function is normal  Wall motion is normal  Diastolic function is moderately abnormal, consistent with grade II (pseudonormal) relaxation    Left Atrium: The atrium is mildly dilated    Aortic Valve: The aortic valve is trileaflet  The leaflets are not thickened  The leaflets are severely calcified  There is severely reduced mobility  There is critical stenosis  The aortic valve velocity is increased due to stenosis    Mitral Valve: There is moderate calcification  There is severe annular calcification  There is moderate to severe regurgitation    Tricuspid Valve: There is mild regurgitation    Pulmonary Artery: The estimated pulmonary artery systolic pressure is 72 8 mmHg  The pulmonary artery systolic pressure is moderately increased  Thank you for allowing us to participate in this patient's care  This pt will follow up with Dr Na Maldonado once discharged  Counseling / Coordination of Care  Total floor / unit time spent today 45 minutes  Greater than 50% of total time was spent with the patient and / or family counseling and / or coordination of care  A description of the counseling / coordination of care: Review of history, current assessment, development of a plan  Code Status: Level 3 - DNAR and DNI    ** Please Note: Dragon 360 Dictation voice to text software may have been used in the creation of this document   **

## 2022-07-21 NOTE — ASSESSMENT & PLAN NOTE
· Patient planned to follow up with CT surgery today, however now rescheduled to Friday   · Outpatient follow up   · Judicious use of fluids in the ER due to diastolic dysfunction

## 2022-07-22 NOTE — CASE MANAGEMENT
Case Management Discharge Planning Note    Patient name Zoe Paulino  Location S /S -01 MRN [id number]  : 1929 Date 2022       Current Admission Date: 2022  Current Admission Diagnosis:New onset atrial fibrillation [facility])   Patient Active Problem List    Diagnosis Date Noted    New onset atrial fibrillation ([facility] ) 2022    Hyponatremia 2022    Aortic valvular disease 2022    Pulmonary edema cardiac cause ([facility] ) 2022    Dementia without behavioral disturbance, unspecified dementia type ([place] [address] ) 2022    Pulmonary hypertension ([place] [address] ) 2021    Type 2 diabetes mellitus without complication, without long-term current use of insulin ([place] [address] ) 2020    Other specified hypothyroidism 2020    Coronary artery disease 2020    HL (hearing loss) 2020    Anxiety 2020    Slow transit constipation 2020    Age-related osteoporosis without current pathological fracture 2020    Severe aortic stenosis 2020    Hyperlipidemia 2013    Hypertension 2013    Asymptomatic bilateral carotid artery stenosis 2013      LOS (days): 1  Geometric Mean LOS (GMLOS) (days): 2 40  Days to GMLOS:1 4     OBJECTIVE:  Risk of Unplanned Readmission Score: 16 83         Current admission status: Inpatient   Preferred Pharmacy:   [place] [address], [address] [address]  Phone: [phone] Fax: [phone]    [facility]/pharmacy [phone] [place] [address]  Phone: [phone] Fax: [phone]    Primary Care Provider: Albina Jose MD    Primary Insurance: [facility]  Secondary Insurance:     DISCHARGE DETAILS:    Contacts  Patient Contacts: Buster Age (daughter)  Relationship to Patient[de-identified] Family  Contact Method: Phone  Phone Number: [phone]  Reason/Outcome: Continuity of Care, Discharge Planning    Other Referral/Resources/Interventions Provided:  Interventions: None Indicated    Treatment Team Recommendation: Home  Discharge Destination Plan[de-identified] Home     IMM Given (Date):: 07/21/22

## 2022-07-22 NOTE — ASSESSMENT & PLAN NOTE
· BP borderline in the ED  · Hold Lisinopril, Norvasc   · Would eventually restart Lisinopril when Na improves for GMDT   · Monitor closely with  Metoprolol

## 2022-07-22 NOTE — ASSESSMENT & PLAN NOTE
· POA, noted at 129   Suspect due to poor oral intake as she hasn't had a strong appetite, she is on Lasix, and she might have some element of SIADH given she is on an ACEI and SSRI       · Status post 500 cc bolus of IVF in ED   · Hold further fluid due to tenuous volume status secondary to AS   · Hold Lisinopril for now   · Would restart as part of GDMT when Na improves  · Continue Lasix   · Check UA, sOSM, uOSM, Elias, Uric Acid, TSH  · Trend BMP

## 2022-07-22 NOTE — QUICK NOTE
HR stable 80-90s, SBP soft 80-90s, still in afib on tele, stopped cardizem gtt  After stopping cardizem drip, informed by RN that patient stated she felt sweaty and felt like she might be sick  VS stable  Afebrile  On exam mildly diaphoretic  She denies any cp, abdominal pain, diarrhea, lightheadedness/dizziness, fever/chills  EKG showing afib controlled HR, LBBB, prolonged QTc   Upon review of H&P, patient reportedly feeling shaky and clammy on admission so does not appear to be a new complaint      -switch zofran for Tigan   -obtain CBC, BMP and check random troponin   -check UA   -continue to monitor for signs of infection/chest pain

## 2022-07-22 NOTE — UTILIZATION REVIEW
Initial Clinical Review    Admission: Date/Time/Statement:   Admission Orders (From admission, onward)     Ordered        07/21/22 1237  INPATIENT ADMISSION  Once                      Orders Placed This Encounter   Procedures    INPATIENT ADMISSION     Standing Status:   Standing     Number of Occurrences:   1     Order Specific Question:   Level of Care     Answer:   Med Surg [16]     Order Specific Question:   Estimated length of stay     Answer:   More than 2 Midnights     Order Specific Question:   Certification     Answer:   I certify that inpatient services are medically necessary for this patient for a duration of greater than two midnights  See H&P and MD Progress Notes for additional information about the patient's course of treatment  ED Arrival Information     Expected   -    Arrival   7/21/2022 10:51    Acuity   Emergent            Means of arrival   Ambulance    Escorted by   [facility]    Service   Hospitalist    Admission type   Emergency            Arrival complaint   SOB           Chief Complaint   Patient presents with    Shortness of Breath     Tachycardia and shortness of breath reported       Initial Presentation: 80 y o  female presented to the ED with shakiness  Patient was just recently discharged from the hospital after congestive heart failure admission  She reports she has been doing well up until today when she felt shaky on her feet  She reported feeling off balance  Her daughter noted that she appeared clammy  She did report that she had some slight shortness of breath, but states that this shortness of breath was different when she was admitted for congestive heart failure  PMH: anxiety, CAD, DM, HLD, HTN   Plan: inpatient admission for evaluation and treatment of new onset Afib, severe aortic stenosis, hyponatremia (129): Cardiology consult, Cardizem drip, start metoprolol 25 mg bid, Eliquis 5 mg bid, telemetry, continue statin, hold lisinpril and Zoloft, continue lasix, trend BMP      Cardiology consult: continue telemetry  Given 10 mg IV diltiazem and started on a gtt  Infusing at 15 mg/hr at time of exam but turned down to 12 5 mg/hr due to well controlled heart rates  Metoprolol tartrate 25 mg BID started by primary team, not given a dose yet- will increase to 25 mg q6 hours and try to wean off diltiazem gtt  Agree with Eliquis 2 5 mg BID  Date: 7/22   Day 2:     Internal Medicine: HR stable 80-90s, SBP soft 80-90s, still in afib on tele, stopped cardizem gtt  After stopping cardizem drip, informed by RN that patient stated she felt sweaty and felt like she might be sick  On exam mildly diaphoretic  Obtain CBC, BMP and check random troponin, check UA, continue telemetry  Cardiology: Hypotensive overnight so cardizem gtt d/c'd  Rates not adequately controlled, increase metoprolol to 50 mg BID  If rates remain uncontrolled tomorrow then plan to add digoxin  Continue Eliquis  Continue Lasix 40 mg PO daily      ED Triage Vitals [07/21/22 1108]   Temperature Pulse Respirations Blood Pressure SpO2   97 9 °F (36 6 °C) (!) 146 18 130/70 94 %      Temp Source Heart Rate Source Patient Position - Orthostatic VS BP Location FiO2 (%)   Oral Monitor Lying Right arm --      Pain Score       No Pain          Wt Readings from Last 1 Encounters:   07/21/22 50 2 kg (110 lb 10 7 oz)     Additional Vital Signs:     Date/Time Temp Pulse Resp BP MAP (mmHg) SpO2 O2 Device   07/22/22 1100 -- 111 Abnormal  -- -- -- -- --   07/22/22 08:14:43 -- -- -- 131/66 88 -- --   07/22/22 0728 97 6 °F (36 4 °C) 124 Abnormal  18 134/83 -- -- --   07/22/22 07:25:55 -- -- -- 134/83 100 -- --   07/22/22 0409 -- 115 Abnormal  -- 122/79 -- -- --   07/21/22 2349 -- -- -- 126/67 87 -- --   07/21/22 2330 -- -- -- 94/53 67 -- --   07/21/22 23:20:45 -- -- -- 94/53 67 -- --   07/21/22 2315 -- -- -- 84/48 Abnormal  60 Abnormal  -- --   07/21/22 2300 -- -- -- 119/75 90 -- --   07/21/22 2245 -- -- -- 106/61 76 -- --   07/21/22 2230 -- -- -- 101/61 74 -- --   07/21/22 2135 -- 97 -- 120/66 -- -- --   07/21/22 20:48:01 98 3 °F (36 8 °C) 82 16 107/66 80 96 % --   07/21/22 19:19:27 -- -- -- 120/58 79 -- --   07/21/22 1900 -- -- -- 111/84 93 -- --   07/21/22 1845 -- -- -- 102/62 75 -- --   07/21/22 1830 -- -- -- 103/62 76 -- --   07/21/22 1815 -- -- -- 100/64 76 -- --   07/21/22 1806 -- -- -- 100/64 -- -- --   07/21/22 1745 -- -- -- 116/64 81 -- --   07/21/22 17:34:23 -- -- -- 116/64 81 -- --   07/21/22 1730 -- -- -- 116/64 81 -- --   07/21/22 1700 -- -- -- 105/55 72 -- --   07/21/22 1645 -- -- -- 100/63 75 -- --   07/21/22 16:12:40 -- -- -- 104/50 68 -- --   07/21/22 1542 97 4 °F (36 3 °C) Abnormal  94 18 100/67 80 97 % None (Room air)   07/21/22 1500 -- -- -- 97/62 74 -- --   07/21/22 1400 -- 98 18 117/64 81 96 % None (Room air)   07/21/22 1315 -- 94 18 117/56 78 96 % --   07/21/22 1245 -- 130 Abnormal  18 103/62 77 95 % None (Room air)   07/21/22 1215 -- 118 Abnormal  18 105/62 76 95 % --   07/21/22 1200 -- 110 Abnormal  18 109/54 77 93 % --   07/21/22 1130 -- 156 Abnormal  18 100/73 82 -- None (Room air)   07/21/22 1116 -- 166 Abnormal  18 98/71 62 Abnormal  96 % None (Room air)   07/21/22 1113 -- -- -- -- -- -- None (Room air)   07/21/22 1110 -- -- -- -- -- 93 % --         Pertinent Labs/Diagnostic Test Results:   XR chest 1 view portable   Final Result by Allison Carey MD (07/21 8729)      Since July 8, 2022, resolved or decreased pulmonary vascular congestion with possible small left pleural effusion                    Workstation performed: [id number]           7/21 EKG:  Sinus bradycardia  Right axis deviation  Left ventricular hypertrophy with QRS widening and repolarization abnormality  Abnormal ECG  When compared with ECG of 08-JUL-2022 07:43,  Left bundle branch block is no longer Present      Results from last 7 days   Lab Units 07/22/22  0028 07/21/22  1128   WBC Thousand/uL 12 90* 8 16   HEMOGLOBIN g/dL 11 4* 11 4* HEMATOCRIT % 35 8 34 1*   PLATELETS Thousands/uL 354 318   NEUTROS ABS Thousands/µL 8 69* 6 27         Results from last 7 days   Lab Units 07/22/22  0028 07/21/22  1128   SODIUM mmol/L 132* 129*   POTASSIUM mmol/L 3 8 4 3   CHLORIDE mmol/L 97 94*   CO2 mmol/L 21 26   ANION GAP mmol/L 14* 9   BUN mg/dL 20 16   CREATININE mg/dL 1 06 0 95   EGFR ml/min/1 73sq m 45 52   CALCIUM mg/dL 9 4 9 5     Results from last 7 days   Lab Units 07/21/22  1128   AST U/L 17   ALT U/L 12   ALK PHOS U/L 72   TOTAL PROTEIN g/dL 6 8   ALBUMIN g/dL 3 9   TOTAL BILIRUBIN mg/dL 0 55     Results from last 7 days   Lab Units 07/22/22  1053 07/22/22  0752 07/21/22  2341 07/21/22  2046 07/21/22  1713   POC GLUCOSE mg/dl 225* 204* 151* 145* 200*     Results from last 7 days   Lab Units 07/22/22  0028 07/21/22  1128   GLUCOSE RANDOM mg/dL 187* 181*     Results from last 7 days   Lab Units 07/21/22  1128   OSMOLALITY, SERUM mmol/*           Results from last 7 days   Lab Units 07/21/22  1723 07/21/22  1350 07/21/22  1128   HS TNI 0HR ng/L  --   --  32   HS TNI 2HR ng/L  --  79*  --    HSTNI D2 ng/L  --  47*  --    HS TNI 4HR ng/L 147*  --   --    HSTNI D4 ng/L 115*  --   --          Results from last 7 days   Lab Units 07/21/22  1128   PROTIME seconds 12 6   INR  0 94   PTT seconds 35     Results from last 7 days   Lab Units 07/21/22  1128   TSH 3RD GENERATON uIU/mL 1 630  1 753     Results from last 7 days   Lab Units 07/22/22  1027   PROCALCITONIN ng/ml <0 05           Results from last 7 days   Lab Units 07/21/22  1229   BNP pg/mL 436*           Results from last 7 days   Lab Units 07/22/22  1018 07/21/22  1128   OSMOLALITY, SERUM mmol/KG  --  275*   OSMO UR mmol/  --      Results from last 7 days   Lab Units 07/22/22  1018   SODIUM UR  48         ED Treatment:   Medication Administration from 07/21/2022 1050 to 07/21/2022 1609       Date/Time Order Dose Route Action     07/21/2022 1141 diltiazem (CARDIZEM) injection 10 mg 10 mg Intravenous Given     07/21/2022 1231 diltiazem (CARDIZEM) 125 mg in sodium chloride 0 9 % 125 mL infusion 15 mg/hr Intravenous Rate/Dose Change     07/21/2022 1206 diltiazem (CARDIZEM) 125 mg in sodium chloride 0 9 % 125 mL infusion 12 5 mg/hr Intravenous Rate/Dose Change     07/21/2022 1142 diltiazem (CARDIZEM) 125 mg in sodium chloride 0 9 % 125 mL infusion 10 mg/hr Intravenous New Bag     07/21/2022 1131 sodium chloride 0 9 % bolus 500 mL 500 mL Intravenous New Bag     07/21/2022 1531 diltiazem (CARDIZEM) 125 mg in sodium chloride 0 9 % 125 mL infusion 12 5 mg/hr Intravenous Rate/Dose Change     07/21/2022 1511 diltiazem (CARDIZEM) 125 mg in sodium chloride 0 9 % 125 mL infusion 15 mg/hr Intravenous New Bag     07/21/2022 1500 cholecalciferol (VITAMIN D3) tablet 1,000 Units 1,000 Units Oral Given        Past Medical History:   Diagnosis Date    Anxiety     Coronary artery disease     as per stefani    Diabetes ([address] )     as per stefani    Disease of thyroid gland     Heart attack ([address] )     as per McIntyre    High cholesterol     as per [facility]    HL (hearing loss)     Wears hearing aid, right    Hyperlipidemia     Hyperlipidemia     as per [facility]    Hypertension     as per [facility]     Present on Admission:   New onset atrial fibrillation ([address] )   Severe aortic stenosis   Hypertension   Anxiety   Coronary artery disease   Type 2 diabetes mellitus without complication, without long-term current use of insulin ([facility])   Hyponatremia      Admitting Diagnosis: SOB (shortness of breath) [R06 02]  New onset atrial fibrillation (HCC) [I48 91]  Atrial fibrillation with rapid ventricular response ([address] ) [I48 91]  Age/Sex: 80 y o  female  Admission Orders:  Scheduled Medications:  apixaban, 2 5 mg, Oral, BID  atorvastatin, 40 mg, Oral, Daily With Dinner  cholecalciferol, 1,000 Units, Oral, Daily  donepezil, 10 mg, Oral, HS  insulin lispro, 1-5 Units, Subcutaneous, TID AC  [START ON 7/25/2022] levothyroxine, 25 mcg, Oral, Once per day on Mon Wed  levothyroxine, 50 mcg, Oral, Once per day on Sun Tue Thu Fri Sat  metoprolol tartrate, 25 mg, Oral, Q6H      Continuous IV Infusions:    diltiazem (CARDIZEM) 125 mg in sodium chloride 0 9 % 125 mL infusion  Rate: 1-15 mL/hr Dose: 1-15 mg/hr  Freq: Continuous Route: IV  Last Dose: Stopped (07/21/22 2330)  Start: 07/21/22 1445 End: 07/21/22 2317     PRN Meds:  ALPRAZolam, 0 25 mg, Oral, Daily PRN  trimethobenzamide, 200 mg, Intramuscular, Q6H PRN        IP CONSULT TO CARDIOLOGY    Network Utilization Review Department  ATTENTION: Please call with any questions or concerns to [phone] and carefully listen to the prompts so that you are directed to the right person  All voicemails are confidential   [facility] u[facility]llen all requests for admission clinical reviews, approved or denied determinations and any other requests to dedicated fax number below belonging to the campus where the patient is receiving treatment   List of dedicated fax numbers for the Facilities:  [address] DENIALS (Administrative/Medical Necessity) [phone]   [address] (Maternity/NICU/Pediatrics) [phone]   [address]  [address] [facility] [place] [address] [id number] [place] [address] Lisa Carballo [address] [facility] [facility] [phone]

## 2022-07-22 NOTE — PROGRESS NOTES
General Cardiology   Progress Note -  Team One   Issa Knee 80 y o  female MRN: [id number]  Unit/Bed#: S -01 Encounter: [id number]    Assessment/ Plan    1  New onset atrial fibrillation with RVR  Presented with weakness and SOB  ECG- Afib with LBBB, HR 87  Concern for more prolonged QTc interval overnight but QTc 490 msec on my review  Agree with removal of possible exacerbating medications if not needed- I e  Zoloft and zofran  Tele reviewed- Afib, rates 90s-130s  Hypotensive overnight so cardizem gtt d/c'd  Rates not adequately controlled, increase metoprolol to 50 mg BID  If rates remain uncontrolled tomorrow then plan to add digoxin  [id number]  Anticoagulation with Eliquis 2 5 mg BID  Recent echo showed preserved LV function with critical AS and mod to severe MR      2  Critical AS  Patient and family interested in TAVR  Needs outpatient follow up with CT surgery, will message valve coordinator to help expedite      3  Moderate to severe MR- continue current medications     4  Chronic diastolic CHF  Euvolemic on exam  Weight: 110 lb  Continue Lasix 40 mg PO daily  Follow I/Os, daily weights, AM BMP     5  HTN- low normal on admission and hypotensive overnight  Cardizem stopped  SBP in 130s this morning  Continue metoprolol and Lasix       6  HLD- on atorvastatin 40 mg daily     7  Reported MI at age 27  Unclear details  No reported stents or intervention  No anginal complaints, continue statin and beta blocker     8  Type 2 DM- A1c 6 3%, management per primary team     9  Hypothyroidism- TSH WNL     10  Hyponatremia- Na 132, improved with 500 mL NSS bolus yesterday     11  Non ischemic myocardial injury in setting of atrial fibrillation with RVR  Troponin 32-->79-->147  ECG- Afib with RVR, LBBB (chronic)    No anginal complaints  Subjective  Feeling better this morning, had a long night   Her daughter was at the bedside and states the patient has had issues with delirium at night during previous hospitalizations  Review of Systems   Constitutional: Negative for chills and malaise/fatigue  Cardiovascular: Negative for chest pain, dyspnea on exertion, leg swelling, orthopnea, palpitations and syncope  Respiratory: Negative for cough, shortness of breath, sleep disturbances due to breathing and sputum production  Gastrointestinal: Negative for bloating, nausea and vomiting  Neurological: Negative for dizziness, light-headedness and weakness  All other systems reviewed and are negative  Objective:   Vitals: Blood pressure 131/66, pulse (!) 111, temperature 97 6 °F (36 4 °C), temperature source Oral, resp  rate 18, height 5' (1 524 m), weight 50 2 kg (110 lb 10 7 oz), SpO2 96 %  , Body mass index is 21 61 kg/m²  ,     Systolic (41KLV), KNL:842 , Min:84 , LLJ:945     Diastolic (76GAU), OAU:03, Min:48, Max:84    Intake/Output Summary (Last 24 hours) at 7/22/2022 1114  Last data filed at 7/21/2022 1512  Gross per 24 hour   Intake 549 46 ml   Output --   Net 549 46 ml     Wt Readings from Last 3 Encounters:   07/21/22 50 2 kg (110 lb 10 7 oz)   07/09/22 50 4 kg (111 lb 1 8 oz)   05/03/22 52 2 kg (115 lb)     Telemetry Review: Afib, rates 90s-130s    EKG personally reviewed by BLANCA Gibson  Afib with LBBB, HR 87  QTc around 490 msec  Physical Exam  Vitals reviewed  Constitutional:       General: She is not in acute distress  Neck:      Vascular: No hepatojugular reflux or JVD  Cardiovascular:      Rate and Rhythm: Tachycardia present  Rhythm irregularly irregular  Heart sounds: Murmur heard  Systolic murmur is present with a grade of 3/6  No friction rub  No gallop  Pulmonary:      Effort: Pulmonary effort is normal  No respiratory distress  Breath sounds: No rales  Comments: Room air  Abdominal:      General: Bowel sounds are normal  There is no distension  Palpations: Abdomen is soft  Tenderness: There is no abdominal tenderness  Musculoskeletal:         General: No tenderness  Normal range of motion  Cervical back: Neck supple  Right lower leg: No edema  Left lower leg: No edema  Skin:     General: Skin is warm and dry  Capillary Refill: Capillary refill takes less than 2 seconds  Findings: No erythema  Neurological:      Mental Status: She is alert and oriented to person, place, and time     Psychiatric:         Mood and Affect: Mood normal      LABORATORY RESULTS      CBC with diff:   Results from last 7 days   Lab Units 07/22/22  0028 07/21/22  1128   WBC Thousand/uL 12 90* 8 16   HEMOGLOBIN g/dL 11 4* 11 4*   HEMATOCRIT % 35 8 34 1*   MCV fL 96 94   PLATELETS Thousands/uL 354 318   MCH pg 30 5 31 3   MCHC g/dL 31 8 33 4   RDW % 13 6 13 4   MPV fL 10 9 11 3   NRBC AUTO /100 WBCs 0 0     CMP:  Results from last 7 days   Lab Units 07/22/22  0028 07/21/22  1128   POTASSIUM mmol/L 3 8 4 3   CHLORIDE mmol/L 97 94*   CO2 mmol/L 21 26   BUN mg/dL 20 16   CREATININE mg/dL 1 06 0 95   CALCIUM mg/dL 9 4 9 5   AST U/L  --  17   ALT U/L  --  12   ALK PHOS U/L  --  72   EGFR ml/min/1 73sq m 45 52     BMP:  Results from last 7 days   Lab Units 07/22/22  0028 07/21/22  1128   POTASSIUM mmol/L 3 8 4 3   CHLORIDE mmol/L 97 94*   CO2 mmol/L 21 26   BUN mg/dL 20 16   CREATININE mg/dL 1 06 0 95   CALCIUM mg/dL 9 4 9 5     Lab Results   Component Value Date    CREATININE 1 06 07/22/2022    CREATININE 0 95 07/21/2022    CREATININE 1 03 07/14/2022     Lab Results   Component Value Date    NTBNP 3,327 (H) 06/29/2022    NTBNP 1,239 (H) 08/30/2021    NTBNP 2,381 (H) 01/21/2021     Results from last 7 days   Lab Units 07/21/22  1128   TSH 3RD GENERATON uIU/mL 1 630  1 753     Results from last 7 days   Lab Units 07/21/22  1128   INR  0 94     Lipid Profile:   No results found for: CHOL  Lab Results   Component Value Date    HDL 58 06/29/2022    HDL 56 01/21/2021    HDL 59 05/08/2020     Lab Results   Component Value Date    LDLCALC 50 2022    LDLCALC 88 2021    LDLCALC 73 2020     Lab Results   Component Value Date    TRIG 79 2022    TRIG 106 2021    TRIG 95 2020       Cardiac testing:   Results for orders placed during the hospital encounter of 21    Echo complete with contrast if indicated    Narrative  [address]  [address]    Transthoracic Echocardiogram  2D, M-mode, Doppler, and Color Doppler    Study date:  08-Mar-2021    Patient: Mati Bueno  MR number: [id number]  Account number: [de-identified]  : 17-Sep-1929  Age: 80 years  Gender: Female  Status: Outpatient  Location: [facility]  Height: 60 in  Weight: 134 lb  BP: 126/ 82 mmHg    Indications: Brain peptide    Diagnoses: R79 89 - Other specified abnormal findings of blood chemistry    Sonographer:  KIMMIE Maher  Referring Physician:  Bess Willard MD  [facility]  [facility]  Interpreting Physician:  Rosemary Garcia MD    SUMMARY    LEFT VENTRICLE:  Systolic function was normal  Ejection fraction was estimated to be 60 %  There were no regional wall motion abnormalities  Features were consistent with a pseudonormal left ventricular filling pattern, with concomitant abnormal relaxation and increased filling pressure (grade 2 diastolic dysfunction)  Doppler parameters were consistent with high ventricular filling pressure  VENTRICULAR SEPTUM:  There was dyssynergic motion  There was sigmoid septal appearance  These changes are consistent with a conduction abnormality or paced rhythm  LEFT ATRIUM:  The atrium was dilated  ATRIAL SEPTUM:  There was a small patent foramen ovale  MITRAL VALVE:  There was moderate annular calcification  There was moderate to severe regurgitation  AORTIC VALVE:  The valve was trileaflet  Leaflets exhibited moderate calcification and moderately reduced cuspal separation    Transaortic velocity was increased due to valvular stenosis  There was moderate stenosis  There was mild regurgitation  Valve mean gradient was 30 mmHg  TRICUSPID VALVE:  There was mild regurgitation  Estimated peak PA pressure was 54 mmHg  The findings suggest moderate pulmonary hypertension  HISTORY: PRIOR HISTORY: DM, HTN, HLD, hypothyroid, bilateral carotid stenosis, CAD, MI    PROCEDURE: The study was performed in the [facility]  This was a routine study  The transthoracic approach was used  The study included complete 2D imaging, M-mode, complete spectral Doppler, and color Doppler  The heart rate was 79  bpm, at the start of the study  Images were obtained from the parasternal, apical, subcostal, and suprasternal notch acoustic windows  Echocardiographic views were limited due to lung interference  This was a technically difficult study  LEFT VENTRICLE: Size was normal  Systolic function was normal  Ejection fraction was estimated to be 60 %  There were no regional wall motion abnormalities  Wall thickness was normal  DOPPLER: The transmitral flow pattern was normal   Features were consistent with a pseudonormal left ventricular filling pattern, with concomitant abnormal relaxation and increased filling pressure (grade 2 diastolic dysfunction)  Doppler parameters were consistent with high ventricular  filling pressure  VENTRICULAR SEPTUM: There was dyssynergic motion  There was sigmoid septal appearance  These changes are consistent with a conduction abnormality or paced rhythm  RIGHT VENTRICLE: The size was normal  Systolic function was normal  Wall thickness was normal     LEFT ATRIUM: The atrium was dilated  ATRIAL SEPTUM: There was a small patent foramen ovale  RIGHT ATRIUM: Size was normal     MITRAL VALVE: There was moderate annular calcification  Valve structure was normal  There was normal leaflet separation  DOPPLER: The transmitral velocity was within the normal range  There was no evidence for stenosis  There was moderate  to severe regurgitation  AORTIC VALVE: The valve was trileaflet  Leaflets exhibited moderate calcification and moderately reduced cuspal separation  DOPPLER: Transaortic velocity was increased due to valvular stenosis  There was moderate stenosis  There was mild  regurgitation  TRICUSPID VALVE: The valve structure was normal  There was normal leaflet separation  DOPPLER: The transtricuspid velocity was within the normal range  There was no evidence for stenosis  There was mild regurgitation  Estimated peak PA  pressure was 54 mmHg  The findings suggest moderate pulmonary hypertension  PULMONIC VALVE: Leaflets exhibited normal thickness, no calcification, and normal cuspal separation  DOPPLER: The transpulmonic velocity was within the normal range  There was trace regurgitation  PERICARDIUM: There was no pericardial effusion  The pericardium was normal in appearance  AORTA: The root exhibited normal size  SYSTEMIC VEINS: IVC: The inferior vena cava was normal in size  Respirophasic changes were normal     MEASUREMENT TABLES    DOPPLER MEASUREMENTS  Aortic valve   (Reference normals)  Peak elaine   335 cm/s   (--)  Peak gradient   45 mmHg   (--)  Mean gradient   30 mmHg   (--)    SYSTEM MEASUREMENT TABLES    2D  %FS: 31 25 %  Ao Diam: 2 7 cm  EDV(Teich): 75 55 ml  EF(Teich): 59 49 %  ESV(Teich): 30 6 ml  IVSd: 1 52 cm  LA Area: 25 59 cm2  LA Diam: 4 52 cm  LVEDV MOD A4C: 51 69 ml  LVEF MOD A4C: 61 25 %  LVESV MOD A4C: 20 03 ml  LVIDd: 4 13 cm  LVIDs: 2 84 cm  LVLd A4C: 5 23 cm  LVLs A4C: 4 57 cm  LVOT Diam: 1 56 cm  LVPWd: 1 01 cm  RA Area: 9 94 cm2  RVIDd: 3 51 cm  RWT: 0 49  SV MOD A4C: 31 66 ml  SV(Teich): 44 95 ml    CW  AR Dec [place]: 3 42 m/s2  AR Dec Time: 1068 78 ms  AR PHT: 309 94 ms  AR Vmax: 3 65 m/s  AR maxP 32 mmHg  AV Env  Ti: 324 77 ms  AV VTI: 84 46 cm  AV Vmax: 3 39 m/s  AV Vmean: 2 6 m/s  AV maxP 07 mmHg  AV meanP 55 mmHg  PRend PG: 10 01 mmHg  PRend Vmax: 1 58 m/s  PV Vmax: 0 76 m/s  PV maxP 3 mmHg  TR Vmax: 3 38 m/s  TR maxP 78 mmHg    MM  TAPSE: 1 56 cm    PW  MILE (VTI): 0 48 cm2  MILE Vmax: 0 51 cm2  E' Sept: 0 04 m/s  E/E' Sept: 33 58  LVOT Env  Ti: 336 82 ms  LVOT VTI: 21 46 cm  LVOT Vmax: 0 9 m/s  LVOT Vmean: 0 64 m/s  LVOT maxPG: 3 27 mmHg  LVOT meanP 86 mmHg  LVSV Dopp: 40 74 ml  MV A Jayesh: 1 26 m/s  MV Dec [place]: 5 74 m/s2  MV DecT: 228 08 ms  MV E Jayesh: 1 3 m/s  MV E/A Ratio: 1 03  MV PHT: 66 14 ms  MVA By PHT: 3 33 cm2    [facility] Accredited [facility]ch[facility]    Prepared and electronically signed by    Gorge Jones MD  Signed 08-Mar-2021 15:24:54    Meds/Allergies   all current active meds have been reviewed and current meds:   Current Facility-Administered Medications   Medication Dose Route Frequency    ALPRAZolam (XANAX) tablet 0 25 mg  0 25 mg Oral Daily PRN    apixaban (ELIQUIS) tablet 2 5 mg  2 5 mg Oral BID    atorvastatin (LIPITOR) tablet 40 mg  40 mg Oral Daily With Dinner    cholecalciferol (VITAMIN D3) tablet 1,000 Units  1,000 Units Oral Daily    donepezil (ARICEPT) tablet 10 mg  10 mg Oral HS    insulin lispro (HumaLOG) 100 units/mL subcutaneous injection 1-5 Units  1-5 Units Subcutaneous TID AC    [START ON 2022] levothyroxine tablet 25 mcg  25 mcg Oral Once per day on     levothyroxine tablet 50 mcg  50 mcg Oral Once per day on Sun Tue Thu Fri Sat    metoprolol tartrate (LOPRESSOR) tablet 25 mg  25 mg Oral Q6H    trimethobenzamide (TIGAN) IM injection 200 mg  200 mg Intramuscular Q6H PRN     Medications Prior to Admission   Medication    ALPRAZolam (XANAX) 0 25 mg tablet    fluocinolone acetonide (DERMOTIC) 0 01 % otic oil    amLODIPine (NORVASC) 2 5 mg tablet    atorvastatin (LIPITOR) 40 mg tablet    betamethasone valerate (VALISONE) 0 1 % cream    Blood Glucose Monitoring Suppl (OneTouch Verio Reflect) w/Device KIT    cholecalciferol (VITAMIN D3) 1,000 units tablet  donepezil (ARICEPT) 10 mg tablet    furosemide (LASIX) 40 mg tablet    glucose blood (OneTouch Verio) test strip    Lancets (freestyle) lancets    levothyroxine 25 mcg tablet    lisinopril (ZESTRIL) 20 mg tablet    metFORMIN (GLUCOPHAGE-XR) 500 mg 24 hr tablet    neomycin-polymyxin-hydrocortisone (CORTISPORIN) 0 35%-10,000 units/mL-1% otic suspension    potassium chloride (K-DUR,KLOR-CON) 20 mEq tablet    sertraline (Zoloft) 25 mg tablet    vitamin B-12 (VITAMIN B-12) 1,000 mcg tablet     Counseling / Coordination of Care  Total floor / unit time spent today 20 minutes  Greater than 50% of total time was spent with the patient and / or family counseling and / or coordination of care  ** Please Note: Dragon 360 Dictation voice to text software may have been used in the creation of this document   **

## 2022-07-22 NOTE — PROGRESS NOTES
[facility]  Progress Note - [address] [facility] 9/17/1929, 80 y o  female MRN: [id number]  Unit/Bed#: S -01 Encounter: [id number]  Primary Care Provider: Js Lawton MD   Date and time admitted to hospital: 7/21/2022 10:51 AM    Hyponatremia  Assessment & Plan  · POA, noted at 129   Suspect due to poor oral intake as she hasn't had a strong appetite, she is on Lasix, and she might have some element of SIADH given she is on an ACEI and SSRI       · Status post 500 cc bolus of IVF in ED   · Hold further fluid due to tenuous volume status secondary to AS   · Hold Lisinopril for now   · Would restart as part of GDMT when Na improves  · Continue Lasix   · Check UA, sOSM, uOSM, Elias, Uric Acid, TSH  · Trend BMP    Severe aortic stenosis  Assessment & Plan  · Patient planned to follow up with CT surgery today, however now rescheduled to Friday   · Outpatient follow up   · Judicious use of fluids in the ER due to diastolic dysfunction     Anxiety  Assessment & Plan  · Appears stable   · Hold Zoloft  · Continue Xanax 0 25 mg QD PRN    Coronary artery disease  Assessment & Plan  · ?remote MI history, but no stenting/angioplasty   · Continue statin   · Will be on beta blocker now for rate control     Type 2 diabetes mellitus without complication, without long-term current use of insulin [facility])  Assessment & Plan  Lab Results   Component Value Date    HGBA1C 6 3 05/03/2022       Recent Labs     07/21/22  2341 07/22/22  0752 07/22/22  1053 07/22/22  1712   POCGLU 151* 204* 225* 127       Blood Sugar Average: Last 72 hrs:  · (P) 175 [id number] A1c controlled   · Hold Metformin  · SSI Algorithm with meals and bedtime  · QID accuchecks     Hypertension  Assessment & Plan  · BP borderline in the ED  · Hold Lisinopril, Norvasc   · Would eventually restart Lisinopril when Na improves for GMDT   · Monitor closely with  Metoprolol     * New onset atrial fibrillation ([facility] [address] )  Assessment & Plan  · POA, patient was shaky and clammy today  Found to be in new onset A  Fib with initial  on EKG  Started on Cardizem with good response   · Suspect related to advanced age, valvular disease, as well as her Nadolol being stopped last hospitalization (on this for HTN per patient)  · [id number] = 7       · Admit patient to med/surg under inpatient status   · Cardiology consult appreciated   · Start Metoprolol 50 mg QID   · Eliquis 2 5 mg BID for anticoagulation   · Keep K and Mg above 4 and 2 respectively   · Monitor telemetry  · No need to repeat ECHO         VTE Pharmacologic Prophylaxis: VTE Score: 4 Moderate Risk (Score 3-4) - Pharmacological DVT Prophylaxis Ordered: enoxaparin (Lovenox)  Patient Centered Rounds: I performed bedside rounds with nursing staff today  Discussions with Specialists or Other Care Team Provider:  Cardiology    Education and Discussions with Family / Patient: Updated  (daughter) at bedside  Current Length of Stay: 1 day(s)  Current Patient Status: Inpatient   Discharge Plan: Anticipate discharge tomorrow to home  Code Status: Level 3 - DNAR and DNI    Subjective: Patrice Back is pleasantly disposed today  She has no acute concerns nor any new symptoms to report  Daughter is at bedside and very helpful with regard to history and plan  Her daughter she is not at her mental baseline  Currently she is pleasantly confused as to her location and the reason for her being here but stable and anticipates discharge tomorrow  She has critical aortic stenosis and plans to follow up outpatient with Cardiology to investigate TAVR  We will continue to monitor her volume  Telemetry was checked demonstrates ongoing AFib with RVR probable past LBBB      Objective:     Vitals:   Temp (24hrs), Av °F (36 7 °C), Min:97 6 °F (36 4 °C), Max:98 3 °F (36 8 °C)    Temp:  [97 6 °F (36 4 °C)-98 3 °F (36 8 °C)] 97 6 °F (36 4 °C)  HR:  [] 101  Resp:  [16-18] 16  BP: ()/(48-84) 118/73  SpO2:  [96 %] 96 %  Body mass index is 21 61 kg/m²  Input and Output Summary (last 24 hours): Intake/Output Summary (Last 24 hours) at 7/22/2022 1725  Last data filed at 7/22/2022 1442  Gross per 24 hour   Intake 600 ml   Output 300 ml   Net 300 ml       Physical Exam:   Physical Exam  Vitals and nursing note reviewed  Constitutional:       General: She is not in acute distress  Appearance: Normal appearance  She is normal weight  She is not ill-appearing, toxic-appearing or diaphoretic  HENT:      Head: Normocephalic and atraumatic  Nose: Nose normal       Mouth/Throat:      Mouth: Mucous membranes are moist    Eyes:      General: No scleral icterus  Right eye: No discharge  Left eye: No discharge  Conjunctiva/sclera: Conjunctivae normal    Cardiovascular:      Rate and Rhythm: Tachycardia present  Rhythm irregular  Pulses: Normal pulses  Heart sounds: Murmur heard  No friction rub  No gallop  Pulmonary:      Effort: Pulmonary effort is normal  No respiratory distress  Breath sounds: Normal breath sounds  No stridor  No wheezing or rales  Abdominal:      General: Bowel sounds are normal  There is no distension  Palpations: Abdomen is soft  Tenderness: There is no abdominal tenderness  There is no guarding  Musculoskeletal:      Cervical back: No rigidity or tenderness  Right lower leg: No edema  Left lower leg: No edema  Lymphadenopathy:      Cervical: No cervical adenopathy  Skin:     General: Skin is warm and dry  Coloration: Skin is not jaundiced or pale  Neurological:      General: No focal deficit present  Mental Status: She is alert  She is disoriented  Motor: No weakness     Psychiatric:         Mood and Affect: Mood normal          Behavior: Behavior normal           Additional Data:     Labs:  Results from last 7 days   Lab Units 07/22/22  0028   WBC Thousand/uL 12 90* HEMOGLOBIN g/dL 11 4*   HEMATOCRIT % 35 8   PLATELETS Thousands/uL 354   NEUTROS PCT % 68   LYMPHS PCT % 21   MONOS PCT % 10   EOS PCT % 0     Results from last 7 days   Lab Units 07/22/22  0028 07/21/22  1128   SODIUM mmol/L 132* 129*   POTASSIUM mmol/L 3 8 4 3   CHLORIDE mmol/L 97 94*   CO2 mmol/L 21 26   BUN mg/dL 20 16   CREATININE mg/dL 1 06 0 95   ANION GAP mmol/L 14* 9   CALCIUM mg/dL 9 4 9 5   ALBUMIN g/dL  --  3 9   TOTAL BILIRUBIN mg/dL  --  0 55   ALK PHOS U/L  --  72   ALT U/L  --  12   AST U/L  --  17   GLUCOSE RANDOM mg/dL 187* 181*     Results from last 7 days   Lab Units 07/21/22  1128   INR  0 94     Results from last 7 days   Lab Units 07/22/22  1712 07/22/22  1053 07/22/22  0752 07/21/22  2341 07/21/22  2046 07/21/22  1713   POC GLUCOSE mg/dl 127 225* 204* 151* 145* 200*         Results from last 7 days   Lab Units 07/22/22  1027   PROCALCITONIN ng/ml <0 05       Lines/Drains:  Invasive Devices  Report    Peripheral Intravenous Line  Duration           Peripheral IV 07/21/22 Right Wrist 1 day                  Telemetry:  Telemetry Orders (From admission, onward)             48 Hour Telemetry Monitoring  Continuous x 48 hours        References:    Telemetry Guidelines   Question:  Reason for 48 Hour Telemetry  Answer:  Arrhythmias Requiring Medical Therapy (eg  SVT, Vtach/fib, Bradycardia, Uncontrolled A-fib)                 Telemetry Reviewed: Atrial fibrillation   HR averaging 100-150bpm  Indication for Continued Telemetry Use: Acute MI/Unstable Angina/Rule out ACS             Imaging: Reviewed radiology reports from this admission including: chest xray    Recent Cultures (last 7 days):         Last 24 Hours Medication List:   Current Facility-Administered Medications   Medication Dose Route Frequency Provider Last Rate    ALPRAZolam  0 25 mg Oral Daily PRN Nelda Lang PA-C      apixaban  2 5 mg Oral BID Meño Arguello PA-C      atorvastatin  40 mg Oral Daily With Baker's, MARINO      cholecalciferol  1,000 Units Oral Daily Meño Roman PA-C      digoxin  125 mcg Intravenous Q6H Radha Sat, CRNP      donepezil  10 mg Oral HS Meño Roman PA-C      [START ON 7/23/2022] furosemide  40 mg Oral Daily Radha Sat, CRNP      insulin lispro  1-5 Units Subcutaneous TID AC Trina Cisse MD      [START ON 7/25/2022] levothyroxine  25 mcg Oral Once per day on Mon Wed Meño Mandujano PA-C      levothyroxine  50 mcg Oral Once per day on Sun Tue Thu Fri Sat Rosas Souza PA-C      metoprolol  5 mg Intravenous Q6H PRN Radha Sat, CRUMANG      metoprolol tartrate  50 mg Oral Q6H Radha Sat, CRNP      trimethobenzamide  200 mg Intramuscular Q6H PRN Amina Low PA-C          Today, Patient Was Seen By: Praneeth Gray MD    **Please Note: This note may have been constructed using a voice recognition system  **

## 2022-07-22 NOTE — ASSESSMENT & PLAN NOTE
· POA, patient was shaky and clammy today  Found to be in new onset A  Fib with initial  on EKG   Started on Cardizem with good response   · Suspect related to advanced age, valvular disease, as well as her Nadolol being stopped last hospitalization (on this for HTN per patient)  · [id number] = 7       · Admit patient to med/surg under inpatient status   · Cardiology consult appreciated   · Start Metoprolol 50 mg QID   · Eliquis 2 5 mg BID for anticoagulation   · Keep K and Mg above 4 and 2 respectively   · Monitor telemetry  · No need to repeat ECHO

## 2022-07-22 NOTE — ASSESSMENT & PLAN NOTE
Lab Results   Component Value Date    HGBA1C 6 3 05/03/2022       Recent Labs     07/21/22  2341 07/22/22  0752 07/22/22  1053 07/22/22  1712   POCGLU 151* 204* 225* 127       Blood Sugar Average: Last 72 hrs:  · (P) 175 [id number] A1c controlled   · Hold Metformin  · SSI Algorithm with meals and bedtime  · QID accuchecks

## 2022-07-22 NOTE — PLAN OF CARE
Problem: DISCHARGE PLANNING  Goal: Discharge to home or other facility with appropriate resources  Description: INTERVENTIONS:  - Identify barriers to discharge w/patient and caregiver  - Arrange for needed discharge resources and transportation as appropriate  - Identify discharge learning needs (meds, wound care, etc )  - Arrange for interpretive services to assist at discharge as needed  - Refer to Case Management Department for coordinating discharge planning if the patient needs post-hospital services based on physician/advanced practitioner order or complex needs related to functional status, cognitive ability, or social support system  Outcome: Progressing     Problem: Knowledge Deficit  Goal: Patient/family/caregiver demonstrates understanding of disease process, treatment plan, medications, and discharge instructions  Description: Complete learning assessment and assess knowledge base    Interventions:  - Provide teaching at level of understanding  - Provide teaching via preferred learning methods  Outcome: Progressing     Problem: CARDIOVASCULAR - ADULT  Goal: Maintains optimal cardiac output and hemodynamic stability  Description: INTERVENTIONS:  - Monitor I/O, vital signs and rhythm  - Monitor for S/S and trends of decreased cardiac output  - Administer and titrate ordered vasoactive medications to optimize hemodynamic stability  - Assess quality of pulses, skin color and temperature  - Assess for signs of decreased coronary artery perfusion  - Instruct patient to report change in severity of symptoms  Outcome: Progressing  Goal: Absence of cardiac dysrhythmias or at baseline rhythm  Description: INTERVENTIONS:  - Continuous cardiac monitoring, vital signs, obtain 12 lead EKG if ordered  - Administer antiarrhythmic and heart rate control medications as ordered  - Monitor electrolytes and administer replacement therapy as ordered  Outcome: Progressing     Problem: MOBILITY - ADULT  Goal: Maintain or return to baseline ADL function  Description: INTERVENTIONS:  -  Assess patient's ability to carry out ADLs; assess patient's baseline for ADL function and identify physical deficits which impact ability to perform ADLs (bathing, care of mouth/teeth, toileting, grooming, dressing, etc )  - Assess/evaluate cause of self-care deficits   - Assess range of motion  - Assess patient's mobility; develop plan if impaired  - Assess patient's need for assistive devices and provide as appropriate  - Encourage maximum independence but intervene and supervise when necessary  - Involve family in performance of ADLs  - Assess for home care needs following discharge   - Consider OT consult to assist with ADL evaluation and planning for discharge  - Provide patient education as appropriate  Outcome: Progressing  Goal: Maintains/Returns to pre admission functional level  Description: INTERVENTIONS:  - Perform BMAT or MOVE assessment daily    - Set and communicate daily mobility goal to care team and patient/family/caregiver     - Collaborate with rehabilitation services on mobility goals if consulted  - Out of bed for toileting  - Record patient progress and toleration of activity level   Outcome: Progressing     Problem: Potential for Falls  Goal: Patient will remain free of falls  Description: INTERVENTIONS:  - Educate patient/family on patient safety including physical limitations  - Instruct patient to call for assistance with activity   - Consult OT/PT to assist with strengthening/mobility   - Keep Call bell within reach  - Keep bed low and locked with side rails adjusted as appropriate  - Keep care items and personal belongings within reach  - Initiate and maintain comfort rounds  - Make Fall Risk Sign visible to staff  - Apply yellow socks and bracelet for high fall risk patients  - Consider moving patient to room near nurses station  Outcome: Progressing

## 2022-07-22 NOTE — PROGRESS NOTES
Patient refusing medication at this time and blood work  Educated patient on the importance of both of these, still uncooperative

## 2022-07-22 NOTE — NURSING NOTE
Pt found sitting on edge of bed stating she felt sweaty  Pt was clammy and stated she felt like she might be sick  When asked to elaborate patient stated she needed to go to the bathroom  Pt had formed BM  /67, HR 93, T 97 4, Blood sugar 151  Provider notified  EKG, Tigan, and labs ordered  Provider at bedside

## 2022-07-22 NOTE — CASE MANAGEMENT
Case Management Progress Note    Patient name Jose Hidalgo  Location S /S -01 MRN [id number]  : 1929 Date 2022       LOS (days): 1  Geometric Mean LOS (GMLOS) (days): 2 40  Days to GMLOS:1 4        OBJECTIVE:        Current admission status: Inpatient  Preferred Pharmacy:   [facility], [address] [address]  Phone: [phone] Fax: [phone]    [facility]/pharmacy [phone] [place] [address]  Phone: [phone] Fax: [phone]    Primary Care Provider: Maxwell Contreras MD    Primary Insurance: Breanne Vickers [facility] REP  Secondary Insurance:     PROGRESS NOTE:    Pt is confused    CM attempted TC pt's daughter-Bhumi (p: [phone]) and left a  requesting return call for initial assessment

## 2022-07-23 PROBLEM — D72.829 ELEVATED WBC COUNT: Status: ACTIVE | Noted: 2022-07-23

## 2022-07-23 NOTE — PROGRESS NOTES
Cardiology Progress Note - Lexx Claudio 80 y o  female MRN: [id number]    Unit/Bed#: S -01 Encounter: [id number]      Assessment:  1  Atrial fibrillation, new onset - [id number] score = 6  2  Critical aortic stenosis  3  Moderate severe mitral regurgitation  4  Chronic diastolic CHF  5  LBBB  6  Benign essential hypertension   7  Dyslipidemia   8  DM type 2   9  Non ischemic myocardial injury secondary to #1    Plan:  1  Rate control improved over last 24 hours after addition of digoxin   2  Continue metoprolol at higher dose of 50 mg every 6 hours -  refused one dose overnight  3  On systemic anticoagulation with Eliquis  4  Euvolemic on current oral diuretic regimen  5  For outpatient follow-up to assess candidacy for TAVR  6  If heart rate controlled after a m  dose of metoprolol can discharge home on 100 mg b i d  and digoxin 0 125 mcg qod - can check level next week   7  Has outpatient follow-up scheduled with advanced practitioner next week, daughter updated at bedside    Subjective:   Patient seen and examined  No significant events overnight  Objective:     Vitals: Blood pressure 131/63, pulse 96, temperature 98 1 °F (36 7 °C), resp  rate 16, height 5' (1 524 m), weight 49 2 kg (108 lb 7 5 oz), SpO2 92 %  , Body mass index is 21 18 kg/m² ,   Orthostatic Blood Pressures    Flowsheet Row Most Recent Value   Blood Pressure 131/63 filed at 07/23/2022 3475   Patient Position - Orthostatic VS Lying filed at 07/22/2022 2207            Intake/Output Summary (Last 24 hours) at 7/23/2022 0806  Last data filed at 7/22/2022 1442  Gross per 24 hour   Intake 600 ml   Output 300 ml   Net 300 ml       Physical Exam:    GEN: Lexx Cluadio appears well, confused this a m    HEENT: pupils equal, round, and reactive to light; extraocular muscles intact  NECK: supple, no carotid bruits   HEART:  Irregularly irregular, variable S1/S2, 2/6 late peaking LATONYA RUSB  LUNGS: clear to auscultation bilaterally; no wheezes, rales, or rhonchi   ABDOMEN: normal bowel sounds, soft, no tenderness, no distention  EXTREMITIES:  No edema  NEURO: no focal findings   SKIN: normal without suspicious lesions on exposed skin    Medications:      Current Facility-Administered Medications:     ALPRAZolam (XANAX) tablet 0 25 mg, 0 25 mg, Oral, Daily PRN, Meño Blair PA-C    apixaban (ELIQUIS) tablet 2 5 mg, 2 5 mg, Oral, BID, Meño Mandujano PA-C, 2 5 mg at 07/22/22 1713    atorvastatin (LIPITOR) tablet 40 mg, 40 mg, Oral, Daily With Dinner, Meño Mandujano PA-C, 40 mg at 07/22/22 1713    cholecalciferol (VITAMIN D3) tablet 1,000 Units, 1,000 Units, Oral, Daily, Meño Mandujano PA-C, 1,000 Units at 07/22/22 2399    furosemide (LASIX) tablet 40 mg, 40 mg, Oral, Daily, BLANCA Mclean    insulin lispro (HumaLOG) 100 units/mL subcutaneous injection 1-5 Units, 1-5 Units, Subcutaneous, TID AC, 2 Units at 07/22/22 1123 **AND** Fingerstick Glucose (POCT), , , TID AC, Trista Salmon MD    [START ON 7/25/2022] levothyroxine tablet 25 mcg, 25 mcg, Oral, Once per day on Mon Wed, Meño Mandujano PA-C    levothyroxine tablet 50 mcg, 50 mcg, Oral, Once per day on Sun Tue Thu Fri Sat, Meño Mandujano PA-C, 50 mcg at 07/23/22 2135    magnesium sulfate IVPB (premix) SOLN 1 g, 1 g, Intravenous, Once, Trista Salmon MD    metoprolol (LOPRESSOR) injection 5 mg, 5 mg, Intravenous, Q6H PRN, BLANCA Mclean    metoprolol tartrate (LOPRESSOR) tablet 50 mg, 50 mg, Oral, Q6H, BLANCA De Leon, 50 mg at 07/22/22 2118    simethicone (MYLICON) oral suspension 40 mg, 40 mg, Oral, Q6H PRN, Jani Berumen MD    trimethobenzamide Chinmay Estrin) IM injection 200 mg, 200 mg, Intramuscular, Q6H PRN, Sanjuana Capps PA-C, 200 mg at 07/22/22 0037     Labs & Results:        Results from last 7 days   Lab Units 07/23/22  0546 07/22/22  0028 07/21/22  1128   WBC Thousand/uL 17 81* 12 90* 8 16   HEMOGLOBIN g/dL 10 6* 11 4* 11 4*   HEMATOCRIT % 32 3* 35 8 34 1*   PLATELETS Thousands/uL 284 354 318         Results from last 7 days   Lab Units 07/23/22  0546 07/22/22  0028 07/21/22  1128   POTASSIUM mmol/L 4 1 3 8 4 3   CHLORIDE mmol/L 98 97 94*   CO2 mmol/L 25 21 26   BUN mg/dL 24 20 16   CREATININE mg/dL 1 06 1 06 0 95   CALCIUM mg/dL 9 5 9 4 9 5   ALK PHOS U/L  --   --  72   ALT U/L  --   --  12   AST U/L  --   --  17     Results from last 7 days   Lab Units 07/21/22  1128   INR  0 94   PTT seconds 35         Counseling / Coordination of Care  Total floor / unit time spent today 25 minutes  Greater than 50% of total time was spent with the patient and / or family counseling and / or coordination of care

## 2022-07-23 NOTE — NURSING NOTE
Nurse was administering night time medications and went over each medication with the patient  As the patient was taking the medications the daughter had questioned donepezil  She reported the patient had stopped taking the medication because it caused "stomach issues " In the navigator under PTA medications it was flagged as patient requested to discontinue the medication   Team 3 night res was made aware and asked to fully discontinue the medication according to original patient request      Joon Tamayo RN

## 2022-07-23 NOTE — PLAN OF CARE
Problem: DISCHARGE PLANNING  Goal: Discharge to home or other facility with appropriate resources  Description: INTERVENTIONS:  - Identify barriers to discharge w/patient and caregiver  - Arrange for needed discharge resources and transportation as appropriate  - Identify discharge learning needs (meds, wound care, etc )  - Arrange for interpretive services to assist at discharge as needed  - Refer to Case Management Department for coordinating discharge planning if the patient needs post-hospital services based on physician/advanced practitioner order or complex needs related to functional status, cognitive ability, or social support system  Outcome: Progressing     Problem: Knowledge Deficit  Goal: Patient/family/caregiver demonstrates understanding of disease process, treatment plan, medications, and discharge instructions  Description: Complete learning assessment and assess knowledge base    Interventions:  - Provide teaching at level of understanding  - Provide teaching via preferred learning methods  Outcome: Progressing     Problem: CARDIOVASCULAR - ADULT  Goal: Maintains optimal cardiac output and hemodynamic stability  Description: INTERVENTIONS:  - Monitor I/O, vital signs and rhythm  - Monitor for S/S and trends of decreased cardiac output  - Administer and titrate ordered vasoactive medications to optimize hemodynamic stability  - Assess quality of pulses, skin color and temperature  - Assess for signs of decreased coronary artery perfusion  - Instruct patient to report change in severity of symptoms  Outcome: Progressing  Goal: Absence of cardiac dysrhythmias or at baseline rhythm  Description: INTERVENTIONS:  - Continuous cardiac monitoring, vital signs, obtain 12 lead EKG if ordered  - Administer antiarrhythmic and heart rate control medications as ordered  - Monitor electrolytes and administer replacement therapy as ordered  Outcome: Progressing     Problem: MOBILITY - ADULT  Goal: Maintain or return to baseline ADL function  Description: INTERVENTIONS:  -  Assess patient's ability to carry out ADLs; assess patient's baseline for ADL function and identify physical deficits which impact ability to perform ADLs (bathing, care of mouth/teeth, toileting, grooming, dressing, etc )  - Assess/evaluate cause of self-care deficits   - Assess range of motion  - Assess patient's mobility; develop plan if impaired  - Assess patient's need for assistive devices and provide as appropriate  - Encourage maximum independence but intervene and supervise when necessary  - Involve family in performance of ADLs  - Assess for home care needs following discharge   - Consider OT consult to assist with ADL evaluation and planning for discharge  - Provide patient education as appropriate  Outcome: Progressing  Goal: Maintains/Returns to pre admission functional level  Description: INTERVENTIONS:  - Perform BMAT or MOVE assessment daily    - Set and communicate daily mobility goal to care team and patient/family/caregiver  - Collaborate with rehabilitation services on mobility goals if consulted  - Perform Range of Motion  times a day  - Reposition patient every  hours    - Dangle patient  times a day  - Stand patient  times a day  - Ambulate patient  times a day  - Out of bed to chair  times a day   - Out of bed for meals  times a day  - Out of bed for toileting  - Record patient progress and toleration of activity level   Outcome: Progressing     Problem: Potential for Falls  Goal: Patient will remain free of falls  Description: INTERVENTIONS:  - Educate patient/family on patient safety including physical limitations  - Instruct patient to call for assistance with activity   - Consult OT/PT to assist with strengthening/mobility   - Keep Call bell within reach  - Keep bed low and locked with side rails adjusted as appropriate  - Keep care items and personal belongings within reach  - Initiate and maintain comfort rounds  - Make Fall Risk Sign visible to staff  - Offer Toileting every  Hours, in advance of need  - Initiate/Maintain alarm  - Obtain necessary fall risk management equipment:   - Apply yellow socks and bracelet for high fall risk patients  - Consider moving patient to room near nurses station  Outcome: Progressing

## 2022-07-23 NOTE — ASSESSMENT & PLAN NOTE
0   Lab Value Date/Time    WBC 17 81 (H) 07/23/2022 0546     17 81 < 12 9 < 8 2 on admission  Cause undetermined  UA negative for nitrites, positive for occasional WBC, RBC, bacteria  Unlikely UTI, not symptomatic  Repeat chest x-ray shows improvement in lower left lobe consolidation      · Monitor blood cultures  · Consider other factors

## 2022-07-23 NOTE — ASSESSMENT & PLAN NOTE
POA, noted at 129   Suspect due to poor oral intake as she hasn't had a strong appetite, she is on Lasix, and she might have some element of SIADH given she is on an ACEI and SSRI       · Status post 500 cc bolus of IVF in ED   · Hold further fluid due to tenuous volume status secondary to AS   · Hold Lisinopril for now   · Would restart as part of GDMT when Na improves  · Continue Lasix   · Check UA, sOSM, uOSM, Elias, Uric Acid, TSH  · Trend BMP

## 2022-07-23 NOTE — PLAN OF CARE
Problem: DISCHARGE PLANNING  Goal: Discharge to home or other facility with appropriate resources  Description: INTERVENTIONS:  - Identify barriers to discharge w/patient and caregiver  - Arrange for needed discharge resources and transportation as appropriate  - Identify discharge learning needs (meds, wound care, etc )  - Arrange for interpretive services to assist at discharge as needed  - Refer to Case Management Department for coordinating discharge planning if the patient needs post-hospital services based on physician/advanced practitioner order or complex needs related to functional status, cognitive ability, or social support system  Outcome: Progressing     Problem: Knowledge Deficit  Goal: Patient/family/caregiver demonstrates understanding of disease process, treatment plan, medications, and discharge instructions  Description: Complete learning assessment and assess knowledge base    Interventions:  - Provide teaching at level of understanding  - Provide teaching via preferred learning methods  Outcome: Progressing     Problem: CARDIOVASCULAR - ADULT  Goal: Maintains optimal cardiac output and hemodynamic stability  Description: INTERVENTIONS:  - Monitor I/O, vital signs and rhythm  - Monitor for S/S and trends of decreased cardiac output  - Administer and titrate ordered vasoactive medications to optimize hemodynamic stability  - Assess quality of pulses, skin color and temperature  - Assess for signs of decreased coronary artery perfusion  - Instruct patient to report change in severity of symptoms  Outcome: Progressing  Goal: Absence of cardiac dysrhythmias or at baseline rhythm  Description: INTERVENTIONS:  - Continuous cardiac monitoring, vital signs, obtain 12 lead EKG if ordered  - Administer antiarrhythmic and heart rate control medications as ordered  - Monitor electrolytes and administer replacement therapy as ordered  Outcome: Progressing     Problem: MOBILITY - ADULT  Goal: Maintain or return to baseline ADL function  Description: INTERVENTIONS:  -  Assess patient's ability to carry out ADLs; assess patient's baseline for ADL function and identify physical deficits which impact ability to perform ADLs (bathing, care of mouth/teeth, toileting, grooming, dressing, etc )  - Assess/evaluate cause of self-care deficits   - Assess range of motion  - Assess patient's mobility; develop plan if impaired  - Assess patient's need for assistive devices and provide as appropriate  - Encourage maximum independence but intervene and supervise when necessary  - Involve family in performance of ADLs  - Assess for home care needs following discharge   - Consider OT consult to assist with ADL evaluation and planning for discharge  - Provide patient education as appropriate  Outcome: Progressing  Goal: Maintains/Returns to pre admission functional level  Description: INTERVENTIONS:  - Perform BMAT or MOVE assessment daily    - Set and communicate daily mobility goal to care team and patient/family/caregiver  - Collaborate with rehabilitation services on mobility goals if consulted  - Perform Range of Motion   - Reposition patient every 2 hours    - Dangle patient  - Stand patient   - Ambulate patient   - Out of bed to chair   - Out of bed for meals   - Out of bed for toileting  - Record patient progress and toleration of activity level   Outcome: Progressing     Problem: Potential for Falls  Goal: Patient will remain free of falls  Description: INTERVENTIONS:  - Educate patient/family on patient safety including physical limitations  - Instruct patient to call for assistance with activity   - Consult OT/PT to assist with strengthening/mobility   - Keep Call bell within reach  - Keep bed low and locked with side rails adjusted as appropriate  - Keep care items and personal belongings within reach  - Initiate and maintain comfort rounds  - Make Fall Risk Sign visible to staff  - Offer Toileting every 2 Hours, in advance of need  - Initiate/Maintain bed/chair alarm  - Obtain necessary fall risk management equipment  - Apply yellow socks and bracelet for high fall risk patients  - Consider moving patient to room near nurses station  Outcome: Progressing     Problem: Prexisting or High Potential for Compromised Skin Integrity  Goal: Skin integrity is maintained or improved  Description: INTERVENTIONS:  - Identify patients at risk for skin breakdown  - Assess and monitor skin integrity  - Assess and monitor nutrition and hydration status  - Monitor labs   - Assess for incontinence   - Turn and reposition patient  - Assist with mobility/ambulation  - Relieve pressure over bony prominences  - Avoid friction and shearing  - Provide appropriate hygiene as needed including keeping skin clean and dry  - Evaluate need for skin moisturizer/barrier cream  - Collaborate with interdisciplinary team   - Patient/family teaching  - Consider wound care consult   Outcome: Progressing

## 2022-07-23 NOTE — ASSESSMENT & PLAN NOTE
POA, patient was shaky and clammy today  Found to be in new onset A  Fib with initial  on EKG  Started on Cardizem with good response   Suspect related to advanced age, valvular disease, as well as her Nadolol being stopped last hospitalization (on this for HTN per patient)  [id number] = 7       · Admit patient to med/surg under inpatient status   · Cardiology consult appreciated   · Start Metoprolol 50 mg QID   · If patient is rate controlled on 50 mg dose after 10:00 a m  Then she is cleared by Cardiology to go home on 100 mg of metoprolol b i d   And daily digoxin - her cardiology note 7/23  · Eliquis 2 5 mg BID for anticoagulation   · Keep K and Mg above 4 and 2 respectively   · Monitor telemetry  · No need to repeat ECHO

## 2022-07-23 NOTE — PROGRESS NOTES
[facility]  Progress Note [facility] 9/17/1929, 80 y o  female MRN: [id number]  Unit/Bed#: S -01 Encounter: [id number]  Primary Care Provider: Maxwell Contreras MD   Date and time admitted to hospital: 7/21/2022 10:51 AM    Elevated WBC count  Assessment & Plan  0   Lab Value Date/Time    WBC 17 81 (H) 07/23/2022 0546     17 81 < 12 9 < 8 2 on admission  Cause undetermined  UA negative for nitrites, positive for occasional WBC, RBC, bacteria  Unlikely UTI, not symptomatic  Repeat chest x-ray shows improvement in lower left lobe consolidation  · Monitor blood cultures  · Consider other factors      Hyponatremia  Assessment & Plan  POA, noted at 129   Suspect due to poor oral intake as she hasn't had a strong appetite, she is on Lasix, and she might have some element of SIADH given she is on an ACEI and SSRI       · Status post 500 cc bolus of IVF in ED   · Hold further fluid due to tenuous volume status secondary to AS   · Hold Lisinopril for now   · Would restart as part of GDMT when Na improves  · Continue Lasix   · Check UA, sOSM, uOSM, Elias, Uric Acid, TSH  · Trend BMP    Severe aortic stenosis  Assessment & Plan  · Patient planned to follow up with CT surgery today, however now rescheduled to Friday   · Outpatient follow up   · Judicious use of fluids in the ER due to diastolic dysfunction     Anxiety  Assessment & Plan  · Appears stable   · Hold Zoloft  · Continue Xanax 0 25 mg QD PRN    Coronary artery disease  Assessment & Plan  · ?remote MI history, but no stenting/angioplasty   · Continue statin   · Will be on beta blocker now for rate control     Type 2 diabetes mellitus without complication, without long-term current use of insulin [facility])  Assessment & Plan  Lab Results   Component Value Date    HGBA1C 6 2 (H) 07/22/2022       Recent Labs     07/22/22  2059 07/23/22  0858 07/23/22  1047 07/23/22  1534   POCGLU 129 126 122 179*       Blood Sugar Average: Last 72 hrs:  · (P) 160 8Recent A1c controlled   · Hold Metformin  · SSI Algorithm with meals and bedtime  · QID accuchecks     Hypertension  Assessment & Plan  · BP borderline in the ED  · Hold Lisinopril, Norvasc   · Would eventually restart Lisinopril when Na improves for GMDT   · Monitor closely with  Metoprolol     * New onset atrial fibrillation [facility])  Assessment & Plan  POA, patient was shaky and clammy today  Found to be in new onset A  Fib with initial  on EKG  Started on Cardizem with good response   Suspect related to advanced age, valvular disease, as well as her Nadolol being stopped last hospitalization (on this for HTN per patient)  [id number] = 7       · Admit patient to med/surg under inpatient status   · Cardiology consult appreciated   · Start Metoprolol 50 mg QID   · If patient is rate controlled on 50 mg dose after 10:00 a m  Then she is cleared by Cardiology to go home on 100 mg of metoprolol b i d  And daily digoxin - her cardiology note 7/23  · Eliquis 2 5 mg BID for anticoagulation   · Keep K and Mg above 4 and 2 respectively   · Monitor telemetry  · No need to repeat ECHO           VTE Pharmacologic Prophylaxis: VTE Score: 4 Moderate Risk (Score 3-4) - Pharmacological DVT Prophylaxis Ordered: apixaban (Eliquis)  Patient Centered Rounds: I performed bedside rounds with nursing staff today  Discussions with Specialists or Other Care Team Provider: -    Education and Discussions with Family / Patient: Updated  (daughter) at bedside  Current Length of Stay: 2 day(s)  Current Patient Status: Inpatient   Discharge Plan: Anticipate discharge in 24-48 hrs to home  Code Status: Level 3 - DNAR and DNI    Subjective: Daniel Camacho is a pleasant mood today  Per patient's daughter she has been confused however, concurrently she is not confused, nor concerned  States that she feels very good and is ready to go home  She was given Zofran overnight for nausea    States nausea is resolved  White blood cell count is elevated  Repeat x-ray chest clear  Urinalysis on convincing  Cause unknown  Will keep her inpatient until cause is identified and treated  Otherwise Cardiology approves of her going home  Objective:     Vitals:   Temp (24hrs), Av 9 °F (36 6 °C), Min:97 7 °F (36 5 °C), Max:98 1 °F (36 7 °C)    Temp:  [97 7 °F (36 5 °C)-98 1 °F (36 7 °C)] 97 7 °F (36 5 °C)  HR:  [] 82  Resp:  [16] 16  BP: (109-131)/(55-87) 109/63  SpO2:  [91 %-92 %] 91 %  Body mass index is 21 18 kg/m²  Input and Output Summary (last 24 hours):   No intake or output data in the 24 hours ending 22 6438    Physical Exam:   Physical Exam  Vitals and nursing note reviewed  Constitutional:       General: She is not in acute distress  Appearance: Normal appearance  She is not ill-appearing, toxic-appearing or diaphoretic  HENT:      Head: Normocephalic and atraumatic  Mouth/Throat:      Mouth: Mucous membranes are moist    Eyes:      General: No scleral icterus  Right eye: No discharge  Left eye: No discharge  Conjunctiva/sclera: Conjunctivae normal    Cardiovascular:      Rate and Rhythm: Tachycardia present  Rhythm irregular  Pulses: Normal pulses  Heart sounds: Murmur heard  No friction rub  No gallop  Pulmonary:      Effort: Pulmonary effort is normal  No respiratory distress  Breath sounds: Normal breath sounds  No stridor  No wheezing or rales  Abdominal:      General: Bowel sounds are normal  There is no distension  Palpations: Abdomen is soft  Tenderness: There is no abdominal tenderness  There is no guarding  Musculoskeletal:      Cervical back: No rigidity  Right lower leg: No edema  Left lower leg: No edema  Skin:     General: Skin is warm and dry  Coloration: Skin is not jaundiced or pale  Neurological:      General: No focal deficit present        Mental Status: She is alert and oriented to person, place, and time  Mental status is at baseline  Motor: No weakness  Psychiatric:         Mood and Affect: Mood normal          Behavior: Behavior normal           Additional Data:     Labs:  Results from last 7 days   Lab Units 22  0546   WBC Thousand/uL 17 81*   HEMOGLOBIN g/dL 10 6*   HEMATOCRIT % 32 3*   PLATELETS Thousands/uL 284   NEUTROS PCT % 87*   LYMPHS PCT % 6*   MONOS PCT % 6   EOS PCT % 0     Results from last 7 days   Lab Units 22  0546 22  0028 22  1128   SODIUM mmol/L 131*   < > 129*   POTASSIUM mmol/L 4 1   < > 4 3   CHLORIDE mmol/L 98   < > 94*   CO2 mmol/L 25   < > 26   BUN mg/dL 24   < > 16   CREATININE mg/dL 1 06   < > 0 95   ANION GAP mmol/L 8   < > 9   CALCIUM mg/dL 9 5   < > 9 5   ALBUMIN g/dL  --   --  3 9   TOTAL BILIRUBIN mg/dL  --   --  0 55   ALK PHOS U/L  --   --  72   ALT U/L  --   --  12   AST U/L  --   --  17   GLUCOSE RANDOM mg/dL 122   < > 181*    < > = values in this interval not displayed       Results from last 7 days   Lab Units 22  1128   INR  0 94     Results from last 7 days   Lab Units 22  1534 22  1047 22  0858 22  2059 22  1712 22  1053 22  0752 22  2341 22  2046 22  1713   POC GLUCOSE mg/dl 179* 122 126 129 127 225* 204* 151* 145* 200*     Results from last 7 days   Lab Units 22  0028   HEMOGLOBIN A1C % 6 2*     Results from last 7 days   Lab Units 22  1027   PROCALCITONIN ng/ml <0 05       Lines/Drains:  Invasive Devices  Report    Peripheral Intravenous Line  Duration           Peripheral IV 22 Left;Ventral (anterior) Wrist <1 day                  Telemetry:  Telemetry Orders (From admission, onward)             48 Hour Telemetry Monitoring  Continuous x 48 hours           References:    Telemetry Guidelines   Question:  Reason for 48 Hour Telemetry  Answer:  Arrhythmias Requiring Medical Therapy (eg  SVT, Vtach/fib, Bradycardia, Uncontrolled A-fib)                 Telemetry Reviewed: Atrial fibrillation  HR averaging 91  Indication for Continued Telemetry Use: Arrthymias requiring medical therapy             Imaging: Reviewed radiology reports from this admission including: chest xray    Recent Cultures (last 7 days):         Last 24 Hours Medication List:   Current Facility-Administered Medications   Medication Dose Route Frequency Provider Last Rate    ALPRAZolam  0 25 mg Oral Daily PRN Clementina Tamez PA-C      apixaban  2 5 mg Oral BID Meño Vasquez PA-C      atorvastatin  40 mg Oral Daily With Baker'sMARINO      cholecalciferol  1,000 Units Oral Daily Meño Vasquez PA-C      furosemide  40 mg Oral Daily Karsten BLANCA Julio      insulin lispro  1-5 Units Subcutaneous TID AC Hillary Hardy MD      [START ON 7/25/2022] levothyroxine  25 mcg Oral Once per day on Mon Wed Meño Mandujano PA-C      levothyroxine  50 mcg Oral Once per day on Sun Tue Thu Fri Sat Clementina Tamez PA-C      metoprolol  5 mg Intravenous Q6H PRN Karsten ShuttersBLANCA      metoprolol tartrate  50 mg Oral Q6H Karsten Shutters, BLANCA      simethicone  40 mg Oral Q6H PRN Khoa Dorantes MD      trimethobenzamide  200 mg Intramuscular Q6H PRN Anirudh Arizmendi PA-C          Today, Patient Was Seen By: Marquis Pagan MD    **Please Note: This note may have been constructed using a voice recognition system  **

## 2022-07-23 NOTE — ASSESSMENT & PLAN NOTE
Lab Results   Component Value Date    HGBA1C 6 2 (H) 07/22/2022       Recent Labs     07/22/22 2059 07/23/22  0858 07/23/22  1047 07/23/22  1534   POCGLU 129 126 122 179*       Blood Sugar Average: Last 72 hrs:  · (P) 160 8Recent A1c controlled   · Hold Metformin  · SSI Algorithm with meals and bedtime  · QID accuchecks

## 2022-07-24 NOTE — ASSESSMENT & PLAN NOTE
0   Lab Value Date/Time    WBC 17 81 (H) 07/23/2022 0546     17 81 < 12 9 < 8 2 on admission  Cause undetermined  UA negative for nitrites, positive for occasional WBC, RBC, bacteria  Unlikely UTI, not symptomatic  Repeat chest x-ray shows improvement in lower left lobe consolidation      · F/u blood cultures

## 2022-07-24 NOTE — PLAN OF CARE
Problem: DISCHARGE PLANNING  Goal: Discharge to home or other facility with appropriate resources  Description: INTERVENTIONS:  - Identify barriers to discharge w/patient and caregiver  - Arrange for needed discharge resources and transportation as appropriate  - Identify discharge learning needs (meds, wound care, etc )  - Arrange for interpretive services to assist at discharge as needed  - Refer to Case Management Department for coordinating discharge planning if the patient needs post-hospital services based on physician/advanced practitioner order or complex needs related to functional status, cognitive ability, or social support system  7/24/2022 1456 by Mona Creda RN  Outcome: Adequate for Discharge  7/24/2022 1143 by Mona Cerda RN  Outcome: Progressing     Problem: Knowledge Deficit  Goal: Patient/family/caregiver demonstrates understanding of disease process, treatment plan, medications, and discharge instructions  Description: Complete learning assessment and assess knowledge base    Interventions:  - Provide teaching at level of understanding  - Provide teaching via preferred learning methods  7/24/2022 1456 by Mona Cerda RN  Outcome: Adequate for Discharge  7/24/2022 1143 by Mona Cerda RN  Outcome: Progressing     Problem: CARDIOVASCULAR - ADULT  Goal: Maintains optimal cardiac output and hemodynamic stability  Description: INTERVENTIONS:  - Monitor I/O, vital signs and rhythm  - Monitor for S/S and trends of decreased cardiac output  - Administer and titrate ordered vasoactive medications to optimize hemodynamic stability  - Assess quality of pulses, skin color and temperature  - Assess for signs of decreased coronary artery perfusion  - Instruct patient to report change in severity of symptoms  7/24/2022 1456 by Mona Cerda RN  Outcome: Adequate for Discharge  7/24/2022 1143 by Mona Cerda RN  Outcome: Progressing  Goal: Absence of cardiac dysrhythmias or at baseline rhythm  Description: INTERVENTIONS:  - Continuous cardiac monitoring, vital signs, obtain 12 lead EKG if ordered  - Administer antiarrhythmic and heart rate control medications as ordered  - Monitor electrolytes and administer replacement therapy as ordered  7/24/2022 1456 by Alden Causey RN  Outcome: Adequate for Discharge  7/24/2022 1143 by Alden Causey RN  Outcome: Progressing     Problem: MOBILITY - ADULT  Goal: Maintain or return to baseline ADL function  Description: INTERVENTIONS:  -  Assess patient's ability to carry out ADLs; assess patient's baseline for ADL function and identify physical deficits which impact ability to perform ADLs (bathing, care of mouth/teeth, toileting, grooming, dressing, etc )  - Assess/evaluate cause of self-care deficits   - Assess range of motion  - Assess patient's mobility; develop plan if impaired  - Assess patient's need for assistive devices and provide as appropriate  - Encourage maximum independence but intervene and supervise when necessary  - Involve family in performance of ADLs  - Assess for home care needs following discharge   - Consider OT consult to assist with ADL evaluation and planning for discharge  - Provide patient education as appropriate  7/24/2022 1456 by Alden Causey RN  Outcome: Adequate for Discharge  7/24/2022 1143 by Alden Causey RN  Outcome: Progressing  Goal: Maintains/Returns to pre admission functional level  Description: INTERVENTIONS:  - Perform BMAT or MOVE assessment daily    - Set and communicate daily mobility goal to care team and patient/family/caregiver  - Collaborate with rehabilitation services on mobility goals if consulted  - Perform Range of Motion  times a day  - Reposition patient every  hours    - Dangle patient  times a day  - Stand patient  times a day  - Ambulate patient  times a day  - Out of bed to chair  times a day   - Out of bed for meals  times a day  - Out of bed for toileting  - Record patient progress and toleration of activity level   7/24/2022 1456 by Marco Coronel RN  Outcome: Adequate for Discharge  7/24/2022 1143 by Marco Coronel RN  Outcome: Progressing     Problem: Potential for Falls  Goal: Patient will remain free of falls  Description: INTERVENTIONS:  - Educate patient/family on patient safety including physical limitations  - Instruct patient to call for assistance with activity   - Consult OT/PT to assist with strengthening/mobility   - Keep Call bell within reach  - Keep bed low and locked with side rails adjusted as appropriate  - Keep care items and personal belongings within reach  - Initiate and maintain comfort rounds  - Make Fall Risk Sign visible to staff  - Offer Toileting every  Hours, in advance of need  - Initiate/Maintain alarm  - Obtain necessary fall risk management equipment:   - Apply yellow socks and bracelet for high fall risk patients  - Consider moving patient to room near nurses station  7/24/2022 1456 by Marco Coronel RN  Outcome: Adequate for Discharge  7/24/2022 1143 by Marco Coronel RN  Outcome: Progressing     Problem: Prexisting or High Potential for Compromised Skin Integrity  Goal: Skin integrity is maintained or improved  Description: INTERVENTIONS:  - Identify patients at risk for skin breakdown  - Assess and monitor skin integrity  - Assess and monitor nutrition and hydration status  - Monitor labs   - Assess for incontinence   - Turn and reposition patient  - Assist with mobility/ambulation  - Relieve pressure over bony prominences  - Avoid friction and shearing  - Provide appropriate hygiene as needed including keeping skin clean and dry  - Evaluate need for skin moisturizer/barrier cream  - Collaborate with interdisciplinary team   - Patient/family teaching  - Consider wound care consult   7/24/2022 1456 by Marco Coronel RN  Outcome: Adequate for Discharge  7/24/2022 1143 by Marco Coronel RN  Outcome: Progressing

## 2022-07-24 NOTE — PLAN OF CARE
Problem: DISCHARGE PLANNING  Goal: Discharge to home or other facility with appropriate resources  Description: INTERVENTIONS:  - Identify barriers to discharge w/patient and caregiver  - Arrange for needed discharge resources and transportation as appropriate  - Identify discharge learning needs (meds, wound care, etc )  - Arrange for interpretive services to assist at discharge as needed  - Refer to Case Management Department for coordinating discharge planning if the patient needs post-hospital services based on physician/advanced practitioner order or complex needs related to functional status, cognitive ability, or social support system  Outcome: Progressing     Problem: Knowledge Deficit  Goal: Patient/family/caregiver demonstrates understanding of disease process, treatment plan, medications, and discharge instructions  Description: Complete learning assessment and assess knowledge base    Interventions:  - Provide teaching at level of understanding  - Provide teaching via preferred learning methods  Outcome: Progressing     Problem: CARDIOVASCULAR - ADULT  Goal: Maintains optimal cardiac output and hemodynamic stability  Description: INTERVENTIONS:  - Monitor I/O, vital signs and rhythm  - Monitor for S/S and trends of decreased cardiac output  - Administer and titrate ordered vasoactive medications to optimize hemodynamic stability  - Assess quality of pulses, skin color and temperature  - Assess for signs of decreased coronary artery perfusion  - Instruct patient to report change in severity of symptoms  Outcome: Progressing  Goal: Absence of cardiac dysrhythmias or at baseline rhythm  Description: INTERVENTIONS:  - Continuous cardiac monitoring, vital signs, obtain 12 lead EKG if ordered  - Administer antiarrhythmic and heart rate control medications as ordered  - Monitor electrolytes and administer replacement therapy as ordered  Outcome: Progressing     Problem: MOBILITY - ADULT  Goal: Maintain or return to baseline ADL function  Description: INTERVENTIONS:  -  Assess patient's ability to carry out ADLs; assess patient's baseline for ADL function and identify physical deficits which impact ability to perform ADLs (bathing, care of mouth/teeth, toileting, grooming, dressing, etc )  - Assess/evaluate cause of self-care deficits   - Assess range of motion  - Assess patient's mobility; develop plan if impaired  - Assess patient's need for assistive devices and provide as appropriate  - Encourage maximum independence but intervene and supervise when necessary  - Involve family in performance of ADLs  - Assess for home care needs following discharge   - Consider OT consult to assist with ADL evaluation and planning for discharge  - Provide patient education as appropriate  Outcome: Progressing  Goal: Maintains/Returns to pre admission functional level  Description: INTERVENTIONS:  - Perform BMAT or MOVE assessment daily    - Set and communicate daily mobility goal to care team and patient/family/caregiver  - Collaborate with rehabilitation services on mobility goals if consulted  - Perform Range of Motion  times a day  - Reposition patient every  hours    - Dangle patient times a day  - Stand patient  times a day  - Ambulate patient  times a day  - Out of bed to chair  times a day   - Out of bed for meals  times a day  - Out of bed for toileting  - Record patient progress and toleration of activity level   Outcome: Progressing     Problem: Potential for Falls  Goal: Patient will remain free of falls  Description: INTERVENTIONS:  - Educate patient/family on patient safety including physical limitations  - Instruct patient to call for assistance with activity   - Consult OT/PT to assist with strengthening/mobility   - Keep Call bell within reach  - Keep bed low and locked with side rails adjusted as appropriate  - Keep care items and personal belongings within reach  - Initiate and maintain comfort rounds  - Make Fall Risk Sign visible to staff  - Offer Toileting every  Hours, in advance of need  - Initiate/Maintain alarm  - Obtain necessary fall risk management equipment:  - Apply yellow socks and bracelet for high fall risk patients  - Consider moving patient to room near nurses station  Outcome: Progressing     Problem: Prexisting or High Potential for Compromised Skin Integrity  Goal: Skin integrity is maintained or improved  Description: INTERVENTIONS:  - Identify patients at risk for skin breakdown  - Assess and monitor skin integrity  - Assess and monitor nutrition and hydration status  - Monitor labs   - Assess for incontinence   - Turn and reposition patient  - Assist with mobility/ambulation  - Relieve pressure over bony prominences  - Avoid friction and shearing  - Provide appropriate hygiene as needed including keeping skin clean and dry  - Evaluate need for skin moisturizer/barrier cream  - Collaborate with interdisciplinary team   - Patient/family teaching  - Consider wound care consult   Outcome: Progressing

## 2022-07-24 NOTE — PLAN OF CARE
Problem: DISCHARGE PLANNING  Goal: Discharge to home or other facility with appropriate resources  Description: INTERVENTIONS:  - Identify barriers to discharge w/patient and caregiver  - Arrange for needed discharge resources and transportation as appropriate  - Identify discharge learning needs (meds, wound care, etc )  - Arrange for interpretive services to assist at discharge as needed  - Refer to Case Management Department for coordinating discharge planning if the patient needs post-hospital services based on physician/advanced practitioner order or complex needs related to functional status, cognitive ability, or social support system  Outcome: Progressing     Problem: Knowledge Deficit  Goal: Patient/family/caregiver demonstrates understanding of disease process, treatment plan, medications, and discharge instructions  Description: Complete learning assessment and assess knowledge base    Interventions:  - Provide teaching at level of understanding  - Provide teaching via preferred learning methods  Outcome: Progressing     Problem: CARDIOVASCULAR - ADULT  Goal: Maintains optimal cardiac output and hemodynamic stability  Description: INTERVENTIONS:  - Monitor I/O, vital signs and rhythm  - Monitor for S/S and trends of decreased cardiac output  - Administer and titrate ordered vasoactive medications to optimize hemodynamic stability  - Assess quality of pulses, skin color and temperature  - Assess for signs of decreased coronary artery perfusion  - Instruct patient to report change in severity of symptoms  Outcome: Progressing  Goal: Absence of cardiac dysrhythmias or at baseline rhythm  Description: INTERVENTIONS:  - Continuous cardiac monitoring, vital signs, obtain 12 lead EKG if ordered  - Administer antiarrhythmic and heart rate control medications as ordered  - Monitor electrolytes and administer replacement therapy as ordered  Outcome: Progressing     Problem: MOBILITY - ADULT  Goal: Maintain or pts mother has been here to visit   return to baseline ADL function  Description: INTERVENTIONS:  -  Assess patient's ability to carry out ADLs; assess patient's baseline for ADL function and identify physical deficits which impact ability to perform ADLs (bathing, care of mouth/teeth, toileting, grooming, dressing, etc )  - Assess/evaluate cause of self-care deficits   - Assess range of motion  - Assess patient's mobility; develop plan if impaired  - Assess patient's need for assistive devices and provide as appropriate  - Encourage maximum independence but intervene and supervise when necessary  - Involve family in performance of ADLs  - Assess for home care needs following discharge   - Consider OT consult to assist with ADL evaluation and planning for discharge  - Provide patient education as appropriate  Outcome: Progressing  Goal: Maintains/Returns to pre admission functional level  Description: INTERVENTIONS:  - Perform BMAT or MOVE assessment daily    - Set and communicate daily mobility goal to care team and patient/family/caregiver  - Collaborate with rehabilitation services on mobility goals if consulted  - Perform Range of Motion  - Reposition patient every 2 hours    - Dangle patient 4 times a day  - Stand patient 4 times a day  - Ambulate patient 4 times a day  - Out of bed to chair 4 times a day   - Out of bed for meals 3 times a day  - Out of bed for toileting  - Record patient progress and toleration of activity level   Outcome: Progressing     Problem: Potential for Falls  Goal: Patient will remain free of falls  Description: INTERVENTIONS:  - Educate patient/family on patient safety including physical limitations  - Instruct patient to call for assistance with activity   - Consult OT/PT to assist with strengthening/mobility   - Keep Call bell within reach  - Keep bed low and locked with side rails adjusted as appropriate  - Keep care items and personal belongings within reach  - Initiate and maintain comfort rounds  - Make Fall Risk Sign visible to staff  - Offer Toileting every 2 Hours, in advance of need  - Initiate/Maintain bed alarm  - Obtain necessary fall risk management equipment:   - Apply yellow socks and bracelet for high fall risk patients  - Consider moving patient to room near nurses station  Outcome: Progressing     Problem: Prexisting or High Potential for Compromised Skin Integrity  Goal: Skin integrity is maintained or improved  Description: INTERVENTIONS:  - Identify patients at risk for skin breakdown  - Assess and monitor skin integrity  - Assess and monitor nutrition and hydration status  - Monitor labs   - Assess for incontinence   - Turn and reposition patient  - Assist with mobility/ambulation  - Relieve pressure over bony prominences  - Avoid friction and shearing  - Provide appropriate hygiene as needed including keeping skin clean and dry  - Evaluate need for skin moisturizer/barrier cream  - Collaborate with interdisciplinary team   - Patient/family teaching  - Consider wound care consult   Outcome: Progressing

## 2022-07-24 NOTE — ASSESSMENT & PLAN NOTE
POA, noted at 129, 131 on 7/23    Suspect due to poor oral intake as she hasn't had a strong appetite, she is on Lasix, and she might have some element of SIADH given she is on an ACEI and SSRI     Plan  · Continue Lasix 40 mg daily  · BMP in 1 week

## 2022-07-24 NOTE — DISCHARGE INSTR - AVS FIRST PAGE
Dear Thanh Bar,     It was our pleasure to care for you here at [facility]  It is our hope that we were always able to exceed the expected standards for your care during your stay  You were hospitalized due to new onset atrial fibrillation with rapid ventricular response  You were cared for on the [facility] 3rd floor by Juan Christy MD under the service of Buck Kingston MD with the [facility] who covers for your primary care physician (PCP), Mukund Echols MD, while you were hospitalized  If you have any questions or concerns related to this hospitalization, you may contact us at [phone]  For follow up as well as any medication refills, we recommend that you follow up with your primary care physician  A registered nurse will reach out to you by phone within a few days after your discharge to answer any additional questions that you may have after going home  However, at this time we provide for you here, the most important instructions / recommendations at discharge:     Notable Medication Adjustments -   Discontinued Lisinopril  Discontinued Amlodipine  Discontinued Zoloft  Started Digoxin 0 125 mcg every other day   Started Eliquis 2 5 mg 2 pills (5mg total) twice per day   Started Metoprolol 100 mg twice per day   Testing Required after Discharge -   BMP in 1 week  Digoxin level in 1 week   Important follow up information -   Follow up cardiology outpatient  Follow up primary care provider outpatient   Other Instructions -   none  Please review this entire after visit summary as additional general instructions including medication list, appointments, activity, diet, any pertinent wound care, and other additional recommendations from your care team that may be provided for you        Sincerely,     Juan Christy MD

## 2022-07-24 NOTE — PROGRESS NOTES
Cardiology Progress Note - Lexx Claudio 80 y o  female MRN: [id number]    Unit/Bed#: S -01 Encounter: [id number]      Assessment:  1  Atrial fibrillation, new onset - [id number] score = 6  2  Critical aortic stenosis  3  Moderate severe mitral regurgitation  4  Chronic diastolic CHF  5  LBBB  6  Benign essential hypertension   7  Dyslipidemia   8  DM type 2   9  Non ischemic myocardial injury secondary to #1    Plan:  1  Rate control improved s/p digoxin   2  Will transition metoprolol tartrate to 100 mg b i d  and digoxin 125 mcg every other day, can check level in AM  3  On systemic anticoagulation with Eliquis  4  Euvolemic on current oral diuretic regimen  5  For outpatient follow-up to assess candidacy for TAVR  6  Workup of leukocytosis impressed by primary  7  Stable for discharge from cardiac perspective when otherwise deemed medically appropriate- has outpatient follow-up with advanced practitioner arranged later this week    Subjective:   Patient seen and examined  No significant events overnight  Objective:     Vitals: Blood pressure 147/83, pulse 73, temperature 97 9 °F (36 6 °C), resp   rate 16, height 5' (1 524 m), weight 49 kg (108 lb), SpO2 91 % , Body mass index is 21 09 kg/m² ,   Orthostatic Blood Pressures    Flowsheet Row Most Recent Value   Blood Pressure 147/83 filed at 07/24/2022 0787   Patient Position - Orthostatic VS Lying filed at 07/23/2022 2213            Intake/Output Summary (Last 24 hours) at 7/24/2022 6350  Last data filed at 7/23/2022 1923  Gross per 24 hour   Intake 280 ml   Output --   Net 280 ml       Physical Exam:    GEN: Lexx Claudio appears well, awake and oriented  HEENT: pupils equal, round, and reactive to light; extraocular muscles intact  NECK: supple, no carotid bruits   HEART:  Irregularly irregular, variable S1/S2, 2/6 late peaking LATONYA RUSB  LUNGS: clear to auscultation bilaterally; no wheezes, rales, or rhonchi   ABDOMEN: normal bowel sounds, soft, no tenderness, no distention  EXTREMITIES:  No edema  NEURO: no focal findings   SKIN: normal without suspicious lesions on exposed skin    Medications:      Current Facility-Administered Medications:     ALPRAZolam (XANAX) tablet 0 25 mg, 0 25 mg, Oral, Daily PRN, Meño Childs PA-C    apixaban (ELIQUIS) tablet 2 5 mg, 2 5 mg, Oral, BID, Meño Mandujano PA-C, 2 5 mg at 07/23/22 1800    atorvastatin (LIPITOR) tablet 40 mg, 40 mg, Oral, Daily With Dinner, Meño Mandujano PA-C, 40 mg at 07/23/22 1800    cholecalciferol (VITAMIN D3) tablet 1,000 Units, 1,000 Units, Oral, Daily, Meño Mandujano PA-C, 1,000 Units at 07/23/22 5180    furosemide (LASIX) tablet 40 mg, 40 mg, Oral, Daily, BLANCA De Leon, 40 mg at 07/23/22 7872    insulin lispro (HumaLOG) 100 units/mL subcutaneous injection 1-5 Units, 1-5 Units, Subcutaneous, TID AC, 1 Units at 07/23/22 1801 **AND** Fingerstick Glucose (POCT), , , TID AC, Jennifer Aleman MD    [START ON 7/25/2022] levothyroxine tablet 25 mcg, 25 mcg, Oral, Once per day on Mon Wed, Meño Mandujano PA-C    levothyroxine tablet 50 mcg, 50 mcg, Oral, Once per day on Sun Tue Thu Fri Sat, Meño Mandujano PA-C, 50 mcg at 07/24/22 8512    metoprolol (LOPRESSOR) injection 5 mg, 5 mg, Intravenous, Q6H PRN, BLANCA Reyna    metoprolol tartrate (LOPRESSOR) tablet 50 mg, 50 mg, Oral, Q6H, BLANCA De Leon, 50 mg at 07/24/22 0256    simethicone (MYLICON) oral suspension 40 mg, 40 mg, Oral, Q6H PRN, Loretta Espinoza MD    trimethobenzamide Lala Burton) IM injection 200 mg, 200 mg, Intramuscular, Q6H PRN, Tiff Brush PA-C, 200 mg at 07/22/22 0037     Labs & Results:        Results from last 7 days   Lab Units 07/23/22  0546 07/22/22  0028 07/21/22  1128   WBC Thousand/uL 17 81* 12 90* 8 16   HEMOGLOBIN g/dL 10 6* 11 4* 11 4*   HEMATOCRIT % 32 3* 35 8 34 1*   PLATELETS Thousands/uL 284 354 318         Results from last 7 days   Lab Units 07/23/22  0546 07/22/22  0028 07/21/22  1128   POTASSIUM mmol/L 4 1 3 8 4 3   CHLORIDE mmol/L 98 97 94*   CO2 mmol/L 25 21 26   BUN mg/dL 24 20 16   CREATININE mg/dL 1 06 1 06 0 95   CALCIUM mg/dL 9 5 9 4 9 5   ALK PHOS U/L  --   --  72   ALT U/L  --   --  12   AST U/L  --   --  17     Results from last 7 days   Lab Units 07/21/22  1128   INR  0 94   PTT seconds 35         Counseling / Coordination of Care  Total floor / unit time spent today 25 minutes  Greater than 50% of total time was spent with the patient and / or family counseling and / or coordination of care

## 2022-07-24 NOTE — DISCHARGE SUMMARY
[address] 9/17/1929, 80 y o  female MRN: [id number]  Unit/Bed#: S -01 Encounter: [id number]  Primary Care Provider: Pura Zaragoza MD   Date and time admitted to hospital: 7/21/2022 10:51 AM    * New onset atrial fibrillation [facility])  Assessment & Plan  POA, patient was shaky and clammy today  Found to be in new onset A  Fib with initial  on EKG  Started on Cardizem with good response   Suspect related to advanced age, valvular disease, as well as her Nadolol being stopped last hospitalization (on this for HTN per patient)  [id number] = 7   Telemetry dc'd    Plan  · Metoprolol 100 mg q6  · Eliquis 2 5 mg bid  · Digoxin   125mcg q6    Elevated WBC count  Assessment & Plan  0   Lab Value Date/Time    WBC 17 81 (H) 07/23/2022 0546     17 81 < 12 9 < 8 2 on admission  Cause undetermined  UA negative for nitrites, positive for occasional WBC, RBC, bacteria  Unlikely UTI, not symptomatic  Repeat chest x-ray shows improvement in lower left lobe consolidation  · F/u blood cultures    Hyponatremia  Assessment & Plan  POA, noted at 129, 131 on 7/23    Suspect due to poor oral intake as she hasn't had a strong appetite, she is on Lasix, and she might have some element of SIADH given she is on an ACEI and SSRI     Plan  · Continue Lasix 40 mg daily  · BMP in 1 week    Severe aortic stenosis  Assessment & Plan  · Outpatient follow up for TAVR for severe AS   · Outpatient f/u cardiology     Anxiety  Assessment & Plan  · Appears stable   · DC'd Zoloft  · Continue Xanax 0 25 mg QD PRN    Coronary artery disease  Assessment & Plan  · ?remote MI history, but no stenting/angioplasty   · Continue statin   · Will be on beta blocker now for rate control     Type 2 diabetes mellitus without complication, without long-term current use of insulin [facility])  Assessment & Plan  Lab Results   Component Value Date    HGBA1C 6 2 (H) 07/22/2022       Recent Labs     07/23/22  1047 07/23/22  1534 07/24/22  0751 07/24/22  1101   POCGLU 122 179* 134 142*       Blood Sugar Average: Last 72 hrs:  · (P) 157Recent A1c controlled   · Metformin continued on discharge    Hypertension  Assessment & Plan  · BP borderline in the ED  · Metoprolol 100 mg q6  · Would eventually restart Lisinopril when Na improves for GMDT   · Amlodipine, Lisinopril dc'd            Medical Problems             Resolved Problems  Date Reviewed: 7/24/2022   None                 Admission Date:   Admission Orders (From admission, onward)     Ordered        07/21/22 1237  INPATIENT ADMISSION  Once                        Admitting Diagnosis: SOB (shortness of breath) [R06 02]  New onset atrial fibrillation (HCC) [I48 91]  Atrial fibrillation with rapid ventricular response ([address] ) [I48 91]    HPI: Sheela Hernandez is a 80 y o  female with a PMH of dCHF, severe AS, CAD, T2DM, HTN who presents with shakiness  Patient was just recently discharged from the hospital after congestive heart failure admission  She reports she has been doing well up until today when she felt shaky on her feet  She reported feeling off balance  Her daughter noted that she appeared clammy  The patient denies falling, dizziness, or losing consciousness  She did report that she had some slight shortness of breath, but states that this shortness of breath was different when she was admitted for congestive heart failure  She denies chest pain, palpitations, or sensation that her heart was racing  She denies any coughing orthopnea  She has not been weighing herself, but states that she does not feel as though she is gaining weight and her legs are not swollen  The patient her daughters at bedside reports she has had a poor oral intake over last few days  They report that she has been mostly eating ice cream, but states that she has been avoiding salt and not salting her food otherwise    The patient her daughters deny the patient having history of any tachyarrhythmias  They do report that she drinks a cup of coffee daily and sometimes T or sodas  Her daughter reports that her nadolol was stopped last hospitalization and that she was on this for blood pressure previously  She also reports she has been on Zoloft for about 1 month, but the patient and the patient's daughter deny noting any change with this medication  Procedures Performed:   Orders Placed This Encounter   Procedures   [address] ED ECG Documentation Only       Summary of Hospital Course: On admission patient was found to be in Afib with RVR and a Cardizem drip was started for rate control  Lisinopril and Amlodipine were held to give more blood pressure for management of Afib with cardizem and Metoprolol  Metformin was held and insulin sliding scale was started  Zoloft was held due to hyponatremia 129  Patient was also started on Eliquis 2 5mg bid  IV Cardizem was stopped due to overnight blood pressures being low  Metoprolol was increased 50 50mg bid  Patient was diuresed with Lasix 40 mg daily  Rate was still not controlled thus Digoxin 0 125 mcg q6 was added and metoprolol was increased to 100mg bid  Rate improved after addition of Digoxin  Significant Findings, Care, Treatment and Services Provided: none    Complications: none    Condition at Discharge: stable         Discharge instructions/Information to patient and family:   See after visit summary for information provided to patient and family  Provisions for Follow-Up Care:  See after visit summary for information related to follow-up care and any pertinent home health orders        PCP: Carlos Pollock MD    Disposition: Home    Planned Readmission: No  Medical Problems             Resolved Problems  Date Reviewed: 7/24/2022   None               Discharging Resident: Alma Rosa Latham MD  Discharging Attending: Arron Galan MD  PCP: Carlos Pollock MD  Admission Date:   Admission Orders (From admission, onward) Ordered        07/21/22 1237  INPATIENT ADMISSION  Once                      Discharge Date: 07/24/22    Consultations During Hospital Stay:  · Cardiology    Procedures Performed:   · None    Significant Findings / Test Results:   · Atrial Fibrillation with rapid ventricular response    Incidental Findings:   · none     Test Results Pending at Discharge (will require follow up): · Blood cultures     Outpatient Tests Requested:  · Check Digoxin levels in 1 week  · BMP in 1 week  · Cardiology outpatient     Complications:  None    Reason for Admission: new onset atrial fibrillation with rapid ventricular response    Hospital Course: Jodi Bassett is a 80 y o  female patient who originally presented to the hospital on 7/21/2022 due to new onset atrial fibrillation with rapid ventricular response    Please see above list of diagnoses and related plan for additional information  Condition at Discharge: stable    Discharge Day Visit / Exam:   Subjective:  Patient denies any fevers, chills, pain, or sob  No dysuria with good urine output  No acute distress  Feels well enough to go home  No acute distress  Vitals: Blood Pressure: 147/83 (07/24/22 0731)  Pulse: 73 (07/24/22 0256)  Temperature: 97 9 °F (36 6 °C) (07/24/22 0731)  Temp Source: Oral (07/23/22 2213)  Respirations: 16 (07/23/22 2213)  Height: 5' (152 4 cm) (07/21/22 1542)  Weight - Scale: 49 kg (108 lb) (07/24/22 0600)  SpO2: 91 % (07/23/22 2213)  Exam:   Physical Exam  Vitals and nursing note reviewed  Constitutional:       General: She is not in acute distress  Appearance: Normal appearance  She is not ill-appearing, toxic-appearing or diaphoretic  HENT:      Head: Normocephalic  Cardiovascular:      Rate and Rhythm: Tachycardia present  Rhythm irregular  Heart sounds: Normal heart sounds  Pulmonary:      Effort: Pulmonary effort is normal       Breath sounds: Normal breath sounds  Abdominal:      Palpations: Abdomen is soft  Skin:     General: Skin is warm  Capillary Refill: Capillary refill takes less than 2 seconds  Neurological:      General: No focal deficit present  Mental Status: She is alert and oriented to person, place, and time  Psychiatric:         Mood and Affect: Mood normal          Behavior: Behavior normal          Thought Content: Thought content normal          Judgment: Judgment normal           Discussion with Family: Updated  (daughter) at bedside  Discharge instructions/Information to patient and family:   See after visit summary for information provided to patient and family  Provisions for Follow-Up Care:  See after visit summary for information related to follow-up care and any pertinent home health orders  Disposition:   Home    Planned Readmission: none    Discharge Medications:  See after visit summary for reconciled discharge medications provided to patient and/or family  **Please Note: This note may have been constructed using a voice recognition system**    Discharge Statement   I spent 30 minutes discharging the patient  This time was spent on the day of discharge  I had direct contact with the patient on the day of discharge  Additional documentation is required if more than 30 minutes were spent on discharge  Discharge Medications:  See after visit summary for reconciled discharge medications provided to patient and family

## 2022-07-24 NOTE — ASSESSMENT & PLAN NOTE
Lab Results   Component Value Date    HGBA1C 6 2 (H) 07/22/2022       Recent Labs     07/23/22  1047 07/23/22  1534 07/24/22  0751 07/24/22  1101   POCGLU 122 179* 134 142*       Blood Sugar Average: Last 72 hrs:  · (P) 157Recent A1c controlled   · Metformin continued on discharge

## 2022-07-24 NOTE — ASSESSMENT & PLAN NOTE
POA, patient was shaky and clammy today  Found to be in new onset A  Fib with initial  on EKG  Started on Cardizem with good response   Suspect related to advanced age, valvular disease, as well as her Nadolol being stopped last hospitalization (on this for HTN per patient)  [id number] = 7   Telemetry dc'd    Plan  · Metoprolol 100 mg q6  · Eliquis 2 5 mg bid  · Digoxin   125mcg q6

## 2022-07-24 NOTE — CASE MANAGEMENT
Case Management Discharge Planning Note    Patient name Latrell Cook  Location S /S -01 MRN [id number]  : 1929 Date 2022       Current Admission Date: 2022  Current Admission Diagnosis:New onset atrial fibrillation [facility])   Patient Active Problem List    Diagnosis Date Noted    Elevated WBC count 2022    [facility] ) 2022    Hyponatremia 2022    Aortic valvular disease 2022    Pulmonary edema cardiac cause ([facility] ) 2022    Dementia without behavioral disturbance, unspecified dementia type ([place] [address] ) 2022    Pulmonary hypertension ([place] [address] ) 2021    Type 2 diabetes mellitus without complication, without long-term current use of insulin ([place] [address] ) 2020    Other specified hypothyroidism 2020    Coronary artery disease 2020    HL (hearing loss) 2020    Anxiety 2020    Slow transit constipation 2020    Age-related osteoporosis without current pathological fracture 2020    Severe aortic stenosis 2020    Hyperlipidemia 2013    Hypertension 2013    Asymptomatic bilateral carotid artery stenosis 2013      LOS (days): 3  Geometric Mean LOS (GMLOS) (days): 2 40  Days to GMLOS:-0 7     OBJECTIVE:  Risk of Unplanned Readmission Score: 17 87         Current admission status: Inpatient   Preferred Pharmacy:   [facility], [address] [place] [address]  Phone: [phone] Fax: [phone]    [phone] [place] [id number]  Phone: [phone] Fax: [phone]    Primary Care Provider: Kevin Cook MD    Primary Insurance: [facility]  Secondary Insurance:     DISCHARGE DETAILS:  CM contacted patient's daughter Nguyễn Gibbons at   CM introduced self and role   CM reviewed with daughter VNA services on the phone per request  Daughter updated [facility] is able to accept with [facility] date 7/28 and [facility] is able to accept with [facility] 8/2  [facility] unable to accept at this time due to capacity  Patient's daughter requesting to cancel VNA referral  Patient has an appointment at PCP on Thursday  CM discussed with daughter if patient requests VNA services in future to contact PCP directly  Daughter expressed understanding on the phone

## 2022-07-25 NOTE — UTILIZATION REVIEW
Notification of Discharge   This is a Notification of Discharge from our facility [address]  Please be advised that this patient has been discharge from our facility  Below you will find the admission and discharge date and time including the patients disposition  UTILIZATION REVIEW CONTACT:  Shelly Briceno  Utilization   Network Utilization Review Department  Phone: [phone] x carefully listen to the prompts  All voicemails are confidential   Email: [email or web address]     PHYSICIAN ADVISORY SERVICES:  FOR [id number] REVIEW - MEDICAL NECESSITY DENIAL  Phone: [phone]  Fax: [phone]  Email: [email or web address]     PRESENTATION DATE: 7/21/2022 10:51 AM  OBERVATION ADMISSION DATE:   INPATIENT ADMISSION DATE: 7/21/22 12:37 PM   DISCHARGE DATE: 7/24/2022  2:58 PM  DISPOSITION: Home/Self Care Home/Self Care      IMPORTANT INFORMATION:  Send all requests for admission clinical reviews, approved or denied determinations and any other requests to dedicated fax number below belonging to the campus where the patient is receiving treatment   List of dedicated fax numbers:  [address] DENIALS (Administrative/Medical Necessity) [phone]   [address] (Maternity/NICU/Pediatrics) [phone]   [facility] [phone]   130 [facility] [phone]   [address] [place] [address] [phone]   [address] [place] [address],[address] [phone]   [facility]  [phone]   2205 [facility], [facility]  [address] [facility] 1000 [address] [facility] [phone]

## 2022-07-27 NOTE — TELEPHONE ENCOUNTER
Barak Aviles is calling to get an order for skilled nursing to come to home for [facility]  Please fax order to [facility]    Fax # [phone]

## 2022-07-27 NOTE — PROGRESS NOTES
General Cardiology Outpatient Progress Note    Grey Parker 80 y o  female   MRN: [id number]  Encounter: [id number]    Assessment:  Patient Active Problem List    Diagnosis Date Noted    Elevated WBC count 07/23/2022    New onset atrial fibrillation ([place] [address] ) 07/21/2022    Hyponatremia 07/21/2022    Aortic valvular disease 07/09/2022    Pulmonary edema cardiac cause ([facility] ) 07/08/2022    Dementia without behavioral disturbance, unspecified dementia type ([place] [address] ) 01/03/2022    Pulmonary hypertension ([place] [address] ) 04/06/2021    Type 2 diabetes mellitus without complication, without long-term current use of insulin ([place] [address] ) 04/24/2020    Other specified hypothyroidism 04/24/2020    Coronary artery disease 04/24/2020    HL (hearing loss) 04/24/2020    Anxiety 04/24/2020    Slow transit constipation 04/24/2020    Age-related osteoporosis without current pathological fracture 04/24/2020    Severe aortic stenosis 04/24/2020    Hyperlipidemia 11/07/2013    Hypertension 11/07/2013    Asymptomatic bilateral carotid artery stenosis 11/07/2013       Today's Plan:   EKG in office today with Afib with RVR, rates in 120s  Increase metoprolol tartrate to 125 mg q12 hours   To complete BMP and dig level early next week  Can consider increasing digoxin to daily dosing at next visit if level WNL and rates remain elevated   To meet with cardiothoracic surgery on 08/08/2022 for critical AS  Patient currently uncertain if she wants to undergo workup and procedure  Plan:   Chronic HFpEF; LVEF 55%; LVIDd 4 5 cm; NYHA III; ACC/AHA Stage C   Etiology: valvular  TTE 03/08/2021: LVEF 60%  LVIDd 4 13 cm  Grade 2 DD  Normal RV  Small PFO  Moderate to severe MR  Mild AI with moderate AS  Mild TR  PASP 54 mmHg  TTE 07/08/2022: LVEF 55%  LVIDd 4 5 cm  Grade 2 DD  Normal RV  Severely calcified AV with critical AS (mean gradient 45 mmHg; MILE 0 55 cm^2)  Moderate to severe MR  Mild TR  PASP 50 mmHg      Weight of 108 lbs on 07/24 (day of discharge)  Today, weighs 107 lbs  Most recent BMP from 07/23/2022: sodium 131; potassium 4 1; BUN 24; creatinine 1 06; eGFR 45  Neurohormonal Blockade:  --Beta Blocker: metoprolol tartrate 125 mg q12 hours  --ARNi / ACEi / ARB: No    --Aldosterone Antagonist: No    --SGLT2 Inhibitor: No    --Diuretic: Lasix 40 mg daily  Sudden Cardiac Death Risk Reduction:   --LVEF >35%  Electrical Resynchronization:  --Candidacy for BiV device: intermittent LBBB with -150 ms  Atrial fibrillation with rapid ventricular response   [id number] = 7 (age, sex, HF, HTN, CAD, DM)  Anticoagulation on Eliquis (renal dose)  Diagnosed 07/2022  Rate control: digoxin 125 mcg QOD and BB as above  Rhythm control: No     Aortic stenosis, critical   TTE 07/08/2022: Severely calcified AV with critical AS (mean gradient 45 mmHg; MILE 0 55 cm^2)  To meet with cardiothoracic surgery on 08/08/2022 for possible TAVR  Coronary artery disease   Without active chest pain  History of MI at age 27; unclear details/interventions  Continue on statin and BB as above  Hypertension   BP of 138/68 mmHg in office today  Continue on medications as above  Chronic kidney disease, stage IIIa   Baseline creatinine of 0 8-1 1  Most recent BMP from 07/23/2022: sodium 131; potassium 4 1; BUN 24; creatinine 1 06; eGFR 45  Hyperlipidemia  Diabetes mellitus, type II  Hypothyroidism  Anxiety    HPI:   Nacho Randhawa is a 30-year-old woman with a PMH as above who presents to the office for hospital follow-up  Admitted to [facility] from 07/21 to 07/24/2022 after presenting with worsening SOB  EKG with Afib with RVR on 07/21 (new diagnosis)  Was started on diltiazem drip and Eliquis, and BB dose increase to wean off drip  Did not require IV diuretics this admission  Amlodipine and lisinopril discontinued  Lost 1 lbs and net positive this admission       07/29/2022: Patient presents with her two daughters for hospital follow-up  Primary complaint today of fatigue and decreased endurance  Denies SOB at rest but report increased need for breaks when ambulating  Did miss one dose of digoxin this week, Did take AM meds today  Currently lives alone  Does enjoy eating salty crackers  Daughter has noted mild decrease in appetite since Afib diagnosis  Discussed TAVR workup and briefly reviewed TAVR course  Patient hesitant to idea of undergoing TAVR but agreeable to meet with CTS to discuss and get more information  Currently lives alone but is considering moving to assisted living facility in near future  Past Medical History:   Diagnosis Date    Anxiety     Coronary artery disease     MI at age 27    Diabetes [facility])     as per stefani    Disease of thyroid gland     HL (hearing loss)     Wears hearing aid, right    Hyperlipidemia     Hypertension     as per [facility]       Review of Systems   Constitutional: Positive for appetite change and fatigue  Negative for activity change, fever and unexpected weight change  HENT: Negative for congestion, postnasal drip, rhinorrhea, sneezing, sore throat and trouble swallowing  Eyes: Negative  Respiratory: Positive for shortness of breath  Negative for cough and chest tightness  Cardiovascular: Negative for chest pain, palpitations and leg swelling  Gastrointestinal: Negative for abdominal distention, abdominal pain, diarrhea, nausea and vomiting  Endocrine: Negative  Genitourinary: Negative for decreased urine volume, difficulty urinating, dysuria and urgency  Musculoskeletal: Negative  Skin: Negative  Allergic/Immunologic: Negative  Neurological: Negative for dizziness, tremors, syncope, weakness, light-headedness and headaches  Hematological: Negative  Psychiatric/Behavioral: Negative for agitation, confusion and sleep disturbance  The patient is not nervous/anxious      14-point ROS completed and negative except as stated above and/or in the HPI     No Known Allergies      Current Outpatient Medications:     ALPRAZolam (XANAX) 0 25 mg tablet, TAKE ONE TABLET BY MOUTH EVERY DAY AS NEEDED, Disp: 30 tablet, Rfl: 5    apixaban (ELIQUIS) 2 5 mg, Take 1 tablet (2 5 mg total) by mouth 2 (two) times a day, Disp: 60 tablet, Rfl: 0    atorvastatin (LIPITOR) 40 mg tablet, TAKE ONE TABLET BY MOUTH EVERY DAY, Disp: 90 tablet, Rfl: 3    Blood Glucose Monitoring Suppl (OneTouch Verio Reflect) w/Device KIT, uad once daily, Disp: 1 kit, Rfl: 0    cholecalciferol (VITAMIN D3) 1,000 units tablet, Take 1,000 Units by mouth daily, Disp: , Rfl:     digoxin (LANOXIN) 0 125 mg tablet, Take 1 tablet (125 mcg total) by mouth every other day, Disp: 15 tablet, Rfl: 0    fluocinolone acetonide (DERMOTIC) 0 01 % otic oil, Administer 5 drops into both ears 2 (two) times a day as needed (itching), Disp: 20 mL, Rfl: 3    furosemide (LASIX) 40 mg tablet, Take 1 tablet (40 mg total) by mouth daily, Disp: 30 tablet, Rfl: 0    glucose blood (OneTouch Verio) test strip, Use as instructed once daily, Disp: 100 strip, Rfl: 11    Lancets (freestyle) lancets, TEST BLOOD SUGAR ONCE IN THE MORNING, Disp: 100 each, Rfl: 11    levothyroxine 25 mcg tablet, TAKE 2 TABLETS BY MOUTH daily other than M and W which you take 1 daily, Disp: 180 tablet, Rfl: 1    metFORMIN (GLUCOPHAGE-XR) 500 mg 24 hr tablet, TAKE 2 TABLETS BY MOUTH TWICE A DAY WITH MEALS, Disp: 360 tablet, Rfl: 0    metoprolol tartrate (LOPRESSOR) 100 mg tablet, Take 1 tablet (100 mg total) by mouth every 12 (twelve) hours, Disp: 60 tablet, Rfl: 0    metoprolol tartrate (LOPRESSOR) 25 mg tablet, Take 1 tablet (25 mg total) by mouth every 12 (twelve) hours In addition to 100 mg tablets for total of 125 mg every 12 hours  , Disp: 60 tablet, Rfl: 2    vitamin B-12 (VITAMIN B-12) 1,000 mcg tablet, Take by mouth daily, Disp: , Rfl:     Social History     Socioeconomic History    Marital status:       Spouse name: Not on file    Number of children: Not on file    Years of education: Not on file    Highest education level: Not on file   Occupational History    Not on file   Tobacco Use    Smoking status: Never Smoker    Smokeless tobacco: Never Used   Vaping Use    Vaping Use: Never used   Substance and Sexual Activity    Alcohol use: Yes     Comment: occasional     Drug use: Never    Sexual activity: Not Currently   Other Topics Concern    Not on file   Social History Narrative    · Do you currently or have you served in the [employer]:   No      · Were you activated, into active duty, as a member of the [employer] or as a Reservist:   No      · Live alone or with others:   alone      · Exercise level:   Occasional      · Overweight:   Yes      · Obese:   No      · General stress level:   Medium      · Diet:   Regular      · Advance directive: Yes      · Caffeine intake: Moderate      · Guns present in home: Yes      · Seat belts used routinely:   Yes      · Sexual orientation:   Heterosexual      · Sunscreen used routinely:   No      · Seat belt/car seat used routinely:   Yes      · Do you have regular medical check ups: Yes      · Do you have regular dental check ups: Yes      · Education:   12      · Smoke alarm in home: Yes      · Salt Intake:   regular      ·      Social Determinants of Health     Financial Resource Strain: Not on file   Food Insecurity: No Food Insecurity    Worried About Running Out of Food in the Last Year: Never true    Dre of Food in the Last Year: Never true   Transportation Needs: No Transportation Needs    Lack of Transportation (Medical): No    Lack of Transportation (Non-Medical):  No   Physical Activity: Not on file   Stress: Not on file   Social Connections: Not on file   Intimate Partner Violence: Not on file   Housing Stability: Low Risk     Unable to Pay for Housing in the Last Year: No    Number of Places Lived in the Last Year: 1    Unstable Housing in the Last Year: No     Family History   Problem Relation Age of Onset    Parkinsonism Mother     Heart attack Father     Coronary artery disease Sister         arteriosclerosis         Vitals:   Blood pressure 138/68, pulse (!) 112, height 5' (1 524 m), weight 48 7 kg (107 lb 4 8 oz), SpO2 99 %  Body mass index is 20 96 kg/m²  Wt Readings from Last 10 Encounters:   07/29/22 48 7 kg (107 lb 4 8 oz)   07/24/22 49 kg (108 lb)   07/09/22 50 4 kg (111 lb 1 8 oz)   05/03/22 52 2 kg (115 lb)   04/27/22 54 kg (119 lb)   02/24/22 54 kg (119 lb)   01/26/22 54 kg (119 lb)   01/03/22 54 kg (119 lb)   10/27/21 54 kg (119 lb)   08/06/21 54 kg (119 lb)     Vitals:    07/29/22 1110 07/29/22 1123   BP: 138/68    BP Location: Left arm    Patient Position: Sitting    Cuff Size: Child    Pulse: (!) 122 (!) 112   SpO2: 99%    Weight: 48 7 kg (107 lb 4 8 oz)    Height: 5' (1 524 m)        Physical Exam  Vitals reviewed  Constitutional:       General: She is awake  She is not in acute distress  Appearance: Normal appearance  She is well-developed and normal weight  She is not toxic-appearing or diaphoretic  HENT:      Head: Normocephalic  Nose: Nose normal       Mouth/Throat:      Mouth: Mucous membranes are moist    Eyes:      General: No scleral icterus  Conjunctiva/sclera: Conjunctivae normal    Neck:      Vascular: No JVD  Trachea: No tracheal deviation  Cardiovascular:      Rate and Rhythm: Tachycardia present  Rhythm irregularly irregular  No extrasystoles are present  Pulses: Normal pulses  Heart sounds: Murmur heard  Pulmonary:      Effort: Pulmonary effort is normal  No tachypnea, bradypnea or respiratory distress  Breath sounds: Normal air entry  No decreased air movement  No decreased breath sounds, wheezing or rhonchi  Abdominal:      General: Bowel sounds are normal  There is no distension  Palpations: Abdomen is soft  Tenderness: There is no abdominal tenderness  Musculoskeletal:      Cervical back: Neck supple  Right lower leg: No edema  Left lower leg: No edema  Skin:     General: Skin is warm and dry  Coloration: Skin is not jaundiced or pale  Neurological:      General: No focal deficit present  Mental Status: She is alert and oriented to person, place, and time  Psychiatric:         Attention and Perception: Attention normal          Mood and Affect: Mood and affect normal          Speech: Speech normal          Behavior: Behavior normal  Behavior is cooperative  Thought Content:  Thought content normal        Labs & Results:  Lab Results   Component Value Date    WBC 17 81 (H) 07/23/2022    HGB 10 6 (L) 07/23/2022    HCT 32 3 (L) 07/23/2022    MCV 94 07/23/2022     07/23/2022     Lab Results   Component Value Date    SODIUM 131 (L) 07/23/2022    K 4 1 07/23/2022    CL 98 07/23/2022    CO2 25 07/23/2022    BUN 24 07/23/2022    CREATININE 1 06 07/23/2022    GLUC 122 07/23/2022    CALCIUM 9 5 07/23/2022     Lab Results   Component Value Date    INR 0 94 07/21/2022    PROTIME 12 6 07/21/2022     Lab Results   Component Value Date    NTBNP 3,327 (H) 06/29/2022      Gale Segundo PA-C

## 2022-07-28 NOTE — TELEPHONE ENCOUNTER
Daughter called new dx of duran Khan tomorrow  D/C from hospital 7/24/2022    BP Last night 97/81 HR ( didn't know)  This morning 103/88 HR 71     Concerned feels BP are running low  S/S fatigue and sob on exertion, denies edema, occasionally lightheadedness, no dizziness      Medication changes in hospital  Currently taking now    Metoprolol 100 mg bid   Eliquis 2 5 mg bid   Digoxin 0 125 mg     Advised has an appt with ROCAEL  Advised to monitor

## 2022-07-29 NOTE — PATIENT INSTRUCTIONS
Increase metoprolol to 125 mg every 12 hours  New prescription for 25 mg tablets sent to pharmacy  Complete blood work in next week  Please weigh yourself every day and keep a detailed log of weights  Contact the Heart Failure program at [phone] if you gain 3 lbs overnight or 5 lbs in 5-7 days  Limit daily sodium/salt intake to 7570-4329 mg daily to prevent fluid retention  Avoid canned foods, fast food/Chinese food, and processed meats (hot dogs, lunch meat, and sausage etc )  Limit fluid intake to 2000 mL or 2L (about 60-65 ounces) daily  Bring complete list of medications and log of daily weights to your follow-up appointment  Transcatheter Aortic Valve Replacement   WHAT YOU NEED TO KNOW:   What do I need to know about a transcatheter aortic valve replacement (TAVR)? A TAVR is a procedure to replace your aortic valve  It is done without removing your old valve  The aortic valve is between the left ventricle and the aorta  The left ventricle is the lower left chamber of your heart  The aorta is a blood vessel that pumps blood to your brain and body  The aortic valve opens and closes to let blood flow from your heart to the rest of your body  TAVR may be used to replace your aortic valve if open heart surgery is not safe for you  Your valve will be replaced with a tissue valve  The tissue may be taken from an animal, such as a pig or cow  What will happen before a TAVR? You may need an echocardiogram, angiogram, EKG, or CT scan before your procedure  These tests will help your healthcare provider plan your procedure  They will also make sure a TAVR is safe for you  You may need to stop taking blood thinner medicine several days before your procedure  This will prevent heavy bleeding during your procedure  Your healthcare provider will talk to you about how to prepare for your procedure  He may tell you not to eat or drink anything after midnight on the day of your procedure  He will tell you what medicines to take or not take on the day of your procedure  You may stay in the hospital 2 to 5 days after your procedure  Arrange for someone to drive you home and stay with you  This person can call 911 if you have problems at home  What will happen during a TAVR? You may be given general anesthesia to keep you asleep and free from pain during your procedure  You may instead be given IV sedation and local anesthesia  IV sedation will make you feel calm and relaxed during the procedure  With local anesthesia, you may still feel pressure or pushing during your procedure, but you should not feel any pain  You may be given an antibiotic through your IV to prevent a bacterial infection  Tell healthcare providers if you have ever had an allergic reaction to an antibiotic  Your healthcare provider will make an incision in your neck, groin, or chest  He will guide a catheter with a balloon on the end through a blood vessel and into your heart  He will inflate the balloon in the opening of your valve  This balloon make space for your new valve to fit inside the old one  The balloon will be deflated and the catheter will be removed  Your healthcare provider will insert another catheter into your heart  This catheter will hold a balloon, a stent, and the new valve  The new valve will be inside of the stent  When the catheter reaches your valve, your healthcare provider will expand the balloon  The balloon will push your old valve against the walls of your heart  The stent and new valve will be put in the old valve's position  The balloon will be deflated  The stent will continue to hold your old valve out of the way  It will also keep your new valve in the correct place  Your healthcare provider will remove the catheter and wire  He may use clamps, stitches, or other devices to close the incision  Pressure will be applied to the incision for several minutes to stop any bleeding   A pressure bandage or other pressure device may be placed over the incision to help prevent more bleeding  What will happen after a TAVR? Healthcare providers will monitor your vital signs and pulses in your arm or leg, closely  They will check your pressure bandage for bleeding or swelling  You will need to lie flat with your leg or arm straight for 2 to 4 hours  Do not  get out of bed until healthcare providers says it is okay  Arm or leg movements can cause serious bleeding  You may need blood tests, a chest x-ray, an EKG, or an echocardiogram before you leave the hospital  These tests will make sure your valve is working correctly  You will need to take blood thinner medicine for at least 6 months after your procedure  You may need to take aspirin for the rest of your life  These medicines will prevent blood clots from forming near your valve  What are the risks of a TAVR? You may have bruising or pain where the catheter was  You may get an infection or bleed more than expected  The catheter may cause a hole in your heart or blood vessels  A blood clot may form around your valve  The clot may travel to your brain and cause a stroke  Your heartbeat may become irregular during or after a TAVR  You may need medicines or procedures to treat this  Your new valve may not work correctly  You may need another procedure to replace the valve  CARE AGREEMENT:   You have the right to help plan your care  Learn about your health condition and how it may be treated  Discuss treatment options with your healthcare providers to decide what care you want to receive  You always have the right to refuse treatment  The above information is an  only  It is not intended as medical advice for individual conditions or treatments  Talk to your doctor, nurse or pharmacist before following any medical regimen to see if it is safe and effective for you    © Copyright [employer] 2022 Information is for End User's use only and may not be sold, redistributed or otherwise used for commercial purposes   All illustrations and images included in CareNotes® are the copyrighted property of A D A M , Inc  or Carolina Pantoja

## 2022-08-01 NOTE — PROGRESS NOTES
Chart review completed for the following sections:   Recent Vital Signs   Allergies/Contradictions   Medication Review    History    [facility]    Problem List   Immunizations   Past hospitalizations and major procedures, including surgery   Significant past illnesses and treatment history including: History and Physical, Other provider notes, PT, OT, ST, Medications/Infusions/Transfusions, Surgery, Wound care and Diet/Fluid restrictions   Relevant past medications related to the patient's condition

## 2022-08-02 NOTE — PROGRESS NOTES
Telephone call from daughter Josephine, provided my name and contact number  Reviewed [address]  program   She declines in program   Offered complex care assistance and she states she is managing her mothers call and would call PCP if assistance needed in future  Patient lives alone in two story town home  Resides on first floor of town home  Daughter visits frequently and assists her with meals, managing medications and transportation  Pt does not use assistive device and able to complete her own personal care  Daughter has scheduled all follow up appointments as per AVS and has all medications in the home  She is monitoring her sodium intake and assisting her with daily weights  No further needs at this time  Will close to [address]  Daughter will call as needed

## 2022-08-04 PROBLEM — E87.1 HYPONATREMIA: Status: RESOLVED | Noted: 2022-01-01 | Resolved: 2022-01-01

## 2022-08-04 NOTE — PATIENT INSTRUCTIONS
Get the BW done tmrw and we can see what to do about the orquidea   CT the head for the acute changes   Might need ER for IV hydration

## 2022-08-05 NOTE — ED PROVIDER NOTES
History  Chief Complaint   Patient presents with    Shortness of Breath     Pt arrives via EMS c/o increased SOB  Dx with a-fib two wks ago as per EMTs  95% on RA with ambulation for EMS with HR in 110's  Pt denies CP  HPI     22-year-old female presents emergency department for evaluation of increased shortness of breath  She was diagnosed with AFib 2 weeks ago  She lives at home by herself but has close family care at home  She denies any chest pain  She is accompanied by her daughter today  Per report, patient's home health nurse came to the house and patient was sent to the emergency department for further evaluation due to shortness of breath on exertion  Patient states that she accidentally forgot to take her medications last evening  She states he typically does take her medications at difficulty  She denies any current shortness of breath at this time and requests discharge home upon initial arrival to the ER  Prior to Admission Medications   Prescriptions Last Dose Informant Patient Reported? Taking?    ALPRAZolam (XANAX) 0 25 mg tablet  Child No Yes   Sig: TAKE ONE TABLET BY MOUTH EVERY DAY AS NEEDED   Blood Glucose Monitoring Suppl (OneTouch Verio Reflect) w/Device KIT  Child No Yes   Sig: uad once daily   Lancets (freestyle) lancets  Child No Yes   Sig: TEST BLOOD SUGAR ONCE IN THE MORNING   apixaban (ELIQUIS) 2 5 mg  Child No Yes   Sig: Take 1 tablet (2 5 mg total) by mouth 2 (two) times a day   atorvastatin (LIPITOR) 40 mg tablet  Child No Yes   Sig: TAKE ONE TABLET BY MOUTH EVERY DAY   cholecalciferol (VITAMIN D3) 1,000 units tablet  Child Yes Yes   Sig: Take 1,000 Units by mouth daily   digoxin (LANOXIN) 0 125 mg tablet  Child No Yes   Sig: Take 1 tablet (125 mcg total) by mouth every other day   digoxin (LANOXIN) 0 125 mg tablet   No Yes   Sig: Take 1 tablet (125 mcg total) by mouth every other day   fluocinolone acetonide (DERMOTIC) 0 01 % otic oil  Child No Yes   Sig: Administer 5 drops into both ears 2 (two) times a day as needed (itching)   furosemide (LASIX) 40 mg tablet  Child No Yes   Sig: Take 1 tablet (40 mg total) by mouth daily   glimepiride (AMARYL) 1 mg tablet   No Yes   Sig: Take 1 tablet (1 mg total) by mouth daily with dinner   glucose blood (OneTouch Verio) test strip  Child No Yes   Sig: Use as instructed once daily   levothyroxine 25 mcg tablet  Child No Yes   Sig: TAKE 2 TABLETS BY MOUTH daily other than M and W which you take 1 daily   metoprolol tartrate (LOPRESSOR) 100 mg tablet  Child No Yes   Sig: Take 1 tablet (100 mg total) by mouth every 12 (twelve) hours   metoprolol tartrate (LOPRESSOR) 25 mg tablet   No Yes   Sig: Take 1 tablet (25 mg total) by mouth every 12 (twelve) hours In addition to 100 mg tablets for total of 125 mg every 12 hours  vitamin B-12 (VITAMIN B-12) 1,000 mcg tablet  Child Yes Yes   Sig: Take by mouth daily      Facility-Administered Medications: None       Past Medical History:   Diagnosis Date    A-fib ([facility] [address] )     Anxiety     Coronary artery disease     MI at age 27    Diabetes [facility])     as per Rowe    Disease of thyroid gland     HL (hearing loss)     Wears hearing aid, right    Hyperlipidemia     Hypertension     as per Sapna Browning       Past Surgical History:   Procedure Laterality Date    ADENOIDECTOMY      APPENDECTOMY      as per Rowe    EYE SURGERY Bilateral 2015    as per Sapna Browning    STAPEDECTOMY      Metal STAPES LEFT ear (can not have an MRI)- as per Sapna Browning    TONSILLECTOMY      as per Sapna Browning       Family History   Problem Relation Age of Onset    Parkinsonism Mother     Heart attack Father     Coronary artery disease Sister         arteriosclerosis       I have reviewed and agree with the history as documented      E-Cigarette/Vaping    E-Cigarette Use Never User      E-Cigarette/Vaping Substances    Nicotine No     THC No     CBD No     Flavoring No     Other No     Unknown No      Social History     Tobacco Use  Smoking status: Never Smoker    Smokeless tobacco: Never Used   Vaping Use    Vaping Use: Never used   Substance Use Topics    Alcohol use: Yes     Comment: occasional     Drug use: Never       Review of Systems   Respiratory: Positive for shortness of breath  Cardiovascular: Positive for palpitations  All other systems reviewed and are negative  Physical Exam  Physical Exam  Vitals and nursing note reviewed  Constitutional:       General: She is not in acute distress  Appearance: She is well-developed  HENT:      Head: Normocephalic and atraumatic  Eyes:      Pupils: Pupils are equal, round, and reactive to light  Cardiovascular:      Rate and Rhythm: Tachycardia present  Rhythm irregular  Heart sounds: Normal heart sounds  No murmur heard  Pulmonary:      Effort: Pulmonary effort is normal  No respiratory distress  Breath sounds: Normal breath sounds  No wheezing or rales  Abdominal:      General: Bowel sounds are normal  There is no distension  Palpations: Abdomen is soft  Tenderness: There is no abdominal tenderness  There is no guarding or rebound  Musculoskeletal:         General: No deformity  Normal range of motion  Cervical back: Normal range of motion and neck supple  Lymphadenopathy:      Cervical: No cervical adenopathy  Skin:     Capillary Refill: Capillary refill takes less than 2 seconds  Findings: No erythema or rash  Neurological:      Mental Status: She is alert and oriented to person, place, and time  Cranial Nerves: No cranial nerve deficit  Motor: No abnormal muscle tone        Coordination: Coordination normal    Psychiatric:         Behavior: Behavior normal          Vital Signs  ED Triage Vitals   Temperature Pulse Respirations Blood Pressure SpO2   08/05/22 1432 08/05/22 1137 08/05/22 1137 08/05/22 1137 08/05/22 1130   97 7 °F (36 5 °C) (!) 108 18 120/89 95 %      Temp Source Heart Rate Source Patient Position - Orthostatic VS BP Location FiO2 (%)   08/05/22 1432 08/05/22 1137 -- -- --   Oral Monitor         Pain Score       08/05/22 1137       No Pain           Vitals:    08/05/22 1137 08/05/22 1432 08/05/22 1445   BP: 120/89  141/77   Pulse: (!) 108 103 100         Visual Acuity      ED Medications  Medications - No data to display    Diagnostic Studies  Results Reviewed     Procedure Component Value Units Date/Time    HS Troponin I 2hr [[id number]]  (Normal) Collected: 08/05/22 1433    Lab Status: Final result Specimen: Blood from Arm, Right Updated: 08/05/22 1515     hs TnI 2hr 31 ng/L      Delta 2hr hsTnI 5 ng/L     Basic metabolic panel [[id number]]  (Abnormal) Collected: 08/05/22 1253    Lab Status: Final result Specimen: Blood from Arm, Right Updated: 08/05/22 1327     Sodium 136 mmol/L      Potassium 5 3 mmol/L      Chloride 101 mmol/L      CO2 21 mmol/L      ANION GAP 14 mmol/L      BUN 67 mg/dL      Creatinine 1 71 mg/dL      Glucose 165 mg/dL      Calcium 9 0 mg/dL      eGFR 25 ml/min/1 73sq m     Narrative:      [place] guidelines for Chronic Kidney Disease (CKD):     Stage 1 with normal or high GFR (GFR > 90 mL/min/1 73 square meters)    Stage 2 Mild CKD (GFR = 60-89 mL/min/1 73 square meters)    Stage 3A Moderate CKD (GFR = 45-59 mL/min/1 73 square meters)    Stage 3B Moderate CKD (GFR = 30-44 mL/min/1 73 square meters)    Stage 4 Severe CKD (GFR = 15-29 mL/min/1 73 square meters)    Stage 5 End Stage CKD (GFR <15 mL/min/1 73 square meters)  Note: GFR calculation is accurate only with a steady state creatinine    FLU/RSV/COVID - if FLU/RSV clinically relevant [[id number]]  (Normal) Collected: 08/05/22 1209    Lab Status: Final result Specimen: Nares from Nose Updated: 08/05/22 1301     SARS-CoV-2 Negative     INFLUENZA A PCR Negative     INFLUENZA B PCR Negative     RSV PCR Negative    Narrative:      FOR PEDIATRIC PATIENTS - copy/paste COVID Guidelines URL to browser: [email or web address]    SARS-CoV-2 assay is a Nucleic Acid Amplification assay intended for the  qualitative detection of nucleic acid from SARS-CoV-2 in nasopharyngeal  swabs  Results are for the presumptive identification of SARS-CoV-2 RNA  Positive results are indicative of infection with SARS-CoV-2, the virus  causing COVID-19, but do not rule out bacterial infection or co-infection  with other viruses  Laboratories within the [place] and its  territories are required to report all positive results to the appropriate  public health authorities  Negative results do not preclude SARS-CoV-2  infection and should not be used as the sole basis for treatment or other  patient management decisions  Negative results must be combined with  clinical observations, patient history, and epidemiological information  This test has not been FDA cleared or approved  This test has been authorized by FDA under an Emergency Use Authorization  (EUA)  This test is only authorized for the duration of time the  declaration that circumstances exist justifying the authorization of the  emergency use of an in vitro diagnostic tests for detection of SARS-CoV-2  virus and/or diagnosis of COVID-19 infection under section 564(b)(1) of  the Act, 21 U  S C  326EVR-4(Q)(8), unless the authorization is terminated  or revoked sooner  The test has been validated but independent review by FDA  and CLIA is pending  Test performed using [employer] GeneXpert: This RT-PCR assay targets N2,  a region unique to SARS-CoV-2  A conserved region in the E-gene was chosen  for pan-Sarbecovirus detection which includes SARS-CoV-2      B-Type Natriuretic Peptide(BNP) AN, CA, EA Campuses Only [[id number]]  (Abnormal) Collected: 08/05/22 1223    Lab Status: Final result Specimen: Blood Updated: 08/05/22 1252     BNP 1,554 pg/mL     HS Troponin 0hr (reflex protocol) [[id number]]  (Normal) Collected: 08/05/22 1155    Lab Status: Final result Specimen: Blood from Arm, Right Updated: 08/05/22 1245     hs TnI 0hr 26 ng/L     HS Troponin I 4hr [[id number]]     Lab Status: No result Specimen: Blood     Protime-INR [[id number]]  (Abnormal) Collected: 08/05/22 1208    Lab Status: Final result Specimen: Blood from Arm, Right Updated: 08/05/22 1236     Protime 21 5 seconds      INR 1 84    CBC and differential [[id number]]  (Abnormal) Collected: 08/05/22 1208    Lab Status: Final result Specimen: Blood from Arm, Right Updated: 08/05/22 1222     WBC 11 54 Thousand/uL      RBC 3 97 Million/uL      Hemoglobin 12 0 g/dL      Hematocrit 39 4 %      MCV 99 fL      MCH 30 2 pg      MCHC 30 5 g/dL      RDW 14 2 %      MPV 12 0 fL      Platelets 097 Thousands/uL      nRBC 0 /100 WBCs      Neutrophils Relative 79 %      Immat GRANS % 1 %      Lymphocytes Relative 10 %      Monocytes Relative 10 %      Eosinophils Relative 0 %      Basophils Relative 0 %      Neutrophils Absolute 9 02 Thousands/µL      Immature Grans Absolute 0 09 Thousand/uL      Lymphocytes Absolute 1 20 Thousands/µL      Monocytes Absolute 1 18 Thousand/µL      Eosinophils Absolute 0 01 Thousand/µL      Basophils Absolute 0 04 Thousands/µL                  CT head without contrast   Final Result by Berny Edwards DO (08/05 1355)      Stable examination with no acute intracranial abnormality  Minor white matter change within the frontal lobes suggestive of mild chronic microangiopathy  Workstation performed: [id number]         XR chest 1 view portable   Final Result by Nikko Arreola MD (08/05 1349)      No acute cardiopulmonary disease                    Workstation performed: [id number]                    Procedures  ECG 12 Lead Documentation Only    Date/Time: 8/5/2022 11:38 AM  Performed by: Maribell Monteiro MD  Authorized by: Maribell Monteiro MD     Indications / Diagnosis:  Shortness of breath  ECG reviewed by me, the ED Provider: yes    Patient location:  ED  Interpretation:     Interpretation: abnormal    Rate:     ECG rate:  104    ECG rate assessment: tachycardic    Rhythm:     Rhythm: atrial fibrillation    Ectopy:     Ectopy: none    QRS:     QRS axis:  Left    QRS intervals: Wide  ST segments:     ST segments:  Normal  T waves:     T waves: non-specific               ED Course             HEART Risk Score    Flowsheet Row Most Recent Value   Heart Score Risk Calculator    History 0 Filed at: 08/05/2022 1659   ECG 1 Filed at: 08/05/2022 1659   Age 2 Filed at: 08/05/2022 1659   Risk Factors 1 Filed at: 08/05/2022 1659   Troponin 1 Filed at: 08/05/2022 1659   HEART Score 5 Filed at: 08/05/2022 1659                                      MDM  Number of Diagnoses or Management Options  Atrial fibrillation ([facility] [address] ): new and requires workup  Dyspnea: new and requires workup  Diagnosis management comments: Likely symptomatic AFib, possible component of fluid overload as patient has gained 3 lb  She states that she cannot take controlled in ER  Initial troponin mildly elevated, repeat increased by 5  Patient did not have any chest pain at all throughout her ER stay, doubt ACS, potentially demand in setting A7 mild volume overload  Had a long discussion with patient and her family  She had requested discharge immediately upon my evaluation however did agree for full evaluation  X-ray with no acute cardiopulmonary disease identified  She gained roughly 3 lb since her last check  Her creatinine has improved since last check  Offered admission for IV diuresis due to likely symptomatic AFib with possible early heart failure  Patient states he is 80years old, does not want aggressive therapy and wishes to be discharged home  She is accompanied by her daughter who agrees  She will follow-up with her outpatient physicians for further management    Her daughter states that she will help her mother remember to take her medications daily to help with AFib in diuresis at home  She was invited back to the emergency department should she feel worse and was encouraged follow-up with her outpatient PCP and cardiologist          Amount and/or Complexity of Data Reviewed  Clinical lab tests: ordered and reviewed  Tests in the radiology section of CPT®: ordered and reviewed  Tests in the medicine section of CPT®: ordered and reviewed  Obtain history from someone other than the patient: yes  Independent visualization of images, tracings, or specimens: yes    Risk of Complications, Morbidity, and/or Mortality  Presenting problems: high  Diagnostic procedures: high  Management options: high    Patient Progress  Patient progress: stable       Wt Readings from Last 3 Encounters:   08/04/22 49 9 kg (110 lb)   07/29/22 48 7 kg (107 lb 4 8 oz)   07/24/22 49 kg (108 lb)         Disposition  Final diagnoses:   Dyspnea   [facility] fibrillation ([facility] )     Time reflects when diagnosis was documented in both MDM as applicable and the Disposition within this note     Time User Action Codes Description Comment    8/5/2022  3:36 PM Satira Croft Add [R06 00] Dyspnea     8/5/2022  3:36 PM Satira Croft Add [I48 91] Atrial fibrillation ([facility] [address] )     8/5/2022  3:37 PM Satira Croft Add [I48 91] Atrial fibrillation with rapid ventricular response [facility])       ED Disposition     ED Disposition   Discharge    Condition   Stable    Date/Time   Fri Aug 5, 2022  3:36 PM    Comment   Jaycee Linn discharge to home/self care                 Follow-up Information     Follow up With Specialties Details Why Contact Info Additional Information    Hilaria Jiang MD Family Medicine Schedule an appointment as soon as possible for a visit in 2 days As needed [address]  [phone]       [facility] [address] Emergency Department Emergency Medicine Go to  If symptoms worsen [address] [facility] [address]  [address] [facility] Emergency Department, [address], [place], [place], [address]    Your cardiology team  Schedule an appointment as soon as possible for a visit in 2 days ER follow-up            Discharge Medication List as of 8/5/2022  3:37 PM      CONTINUE these medications which have CHANGED    Details   digoxin (LANOXIN) 0 125 mg tablet Take 1 tablet (125 mcg total) by mouth every other day, Starting Fri 8/5/2022, Until Sun 9/4/2022, Print         CONTINUE these medications which have NOT CHANGED    Details   ALPRAZolam (XANAX) 0 25 mg tablet TAKE ONE TABLET BY MOUTH EVERY DAY AS NEEDED, Normal      apixaban (ELIQUIS) 2 5 mg Take 1 tablet (2 5 mg total) by mouth 2 (two) times a day, Starting Sun 7/24/2022, Until Tue 8/23/2022, Normal      atorvastatin (LIPITOR) 40 mg tablet TAKE ONE TABLET BY MOUTH EVERY DAY, Normal      Blood Glucose Monitoring Suppl (OneTouch Verio Reflect) w/Device KIT uad once daily, Print      cholecalciferol (VITAMIN D3) 1,000 units tablet Take 1,000 Units by mouth daily, Historical Med      fluocinolone acetonide (DERMOTIC) 0 01 % otic oil Administer 5 drops into both ears 2 (two) times a day as needed (itching), Starting Mon 1/3/2022, Normal      furosemide (LASIX) 40 mg tablet Take 1 tablet (40 mg total) by mouth daily, Starting Mon 7/11/2022, Until Wed 8/10/2022, Normal      glimepiride (AMARYL) 1 mg tablet Take 1 tablet (1 mg total) by mouth daily with dinner, Starting Thu 8/4/2022, Normal      glucose blood (OneTouch Verio) test strip Use as instructed once daily, Print      Lancets (freestyle) lancets TEST BLOOD SUGAR ONCE IN THE MORNING, Normal      levothyroxine 25 mcg tablet TAKE 2 TABLETS BY MOUTH daily other than M and W which you take 1 daily, Normal      !! metoprolol tartrate (LOPRESSOR) 100 mg tablet Take 1 tablet (100 mg total) by mouth every 12 (twelve) hours, Starting Sun 7/24/2022, Until Tue 8/23/2022, Normal      !! metoprolol tartrate (LOPRESSOR) 25 mg tablet Take 1 tablet (25 mg total) by mouth every 12 (twelve) hours In addition to 100 mg tablets for total of 125 mg every 12 hours  , Starting Fri 7/29/2022, Normal      vitamin B-12 (VITAMIN B-12) 1,000 mcg tablet Take by mouth daily, Historical Med       !! - Potential duplicate medications found  Please discuss with provider  No discharge procedures on file      PDMP Review       Value Time User    PDMP Reviewed  Yes 12/7/2020  8:39 AM Erin Terjo          ED Provider  Electronically Signed by           Ayo Mejia MD  08/05/22 9339

## 2022-08-05 NOTE — TELEPHONE ENCOUNTER
Kelly Thorne from [facility] was out to see Jaycee Alcantara today  Kelly Thorne says that Jaycee Alcantara is more short of breath today that usual   She said she had to stop two times going to the bathroom to catch her breath    Kelly Thorne called the squad and is sending her to the hospital

## 2022-08-11 NOTE — TELEPHONE ENCOUNTER
Tessy Lemos called and stated that she saw Jemal Pa today and she is up 3 2 lbs since yesterday  She has edema in both feet and her abdomen looks a little extended  Tessy Lemos states she sees Cardio tomorrow but didn't know if the doctor wanted to do something before then

## 2022-08-12 NOTE — PROGRESS NOTES
General Cardiology Outpatient Progress Note    Alejandra Soto 80 y o  female   MRN: [id number]  Encounter: [id number]    Assessment:  Patient Active Problem List    Diagnosis Date Noted    Elevated WBC count 07/23/2022    New onset atrial fibrillation ([facility] ) 07/21/2022    Aortic valvular disease 07/09/2022    Pulmonary edema cardiac cause ([facility] ) 07/08/2022    Dementia without behavioral disturbance, unspecified dementia type ([place] [id number] ) 01/03/2022    Pulmonary hypertension ([place] [address] ) 04/06/2021    Type 2 diabetes mellitus without complication, without long-term current use of insulin ([place] [address] ) 04/24/2020    Other specified hypothyroidism 04/24/2020    Coronary artery disease 04/24/2020    HL (hearing loss) 04/24/2020    Anxiety 04/24/2020    Slow transit constipation 04/24/2020    Age-related osteoporosis without current pathological fracture 04/24/2020    Severe aortic stenosis 04/24/2020    Hyperlipidemia 11/07/2013    Hypertension 11/07/2013    Asymptomatic bilateral carotid artery stenosis 11/07/2013       Today's Plan:   Increase Lasix to 40 mg BID for next 5-7 days  Once weights come down/sypmtoms improve, advised to decrease back to 40 mg daily   If requires BID Lasix >1 week, would recommend repeating BMP   Afib rates well controlled in office today  If goes back into RVR, can consider increasing digoxin dose (dig level of 1 1 on 08/02/2022)   Cancelled CT surgery appt on 08/08  Not interested in pursuing surgical intervention and wishes to pursue medical management alone   Discussed palliative care with patient and her daughters and provided information on AVS for them to review  Will contact office if interested in ambulatory referral     Refills sent to pharmacy as requested  Plan:   Chronic HFpEF; LVEF 55%; LVIDd 4 5 cm; NYHA III; ACC/AHA Stage C   Etiology: valvular  TTE 03/08/2021: LVEF 60%  LVIDd 4 13 cm  Grade 2 DD  Normal RV  Small PFO  Moderate to severe MR   Mild AI with moderate AS  Mild TR  PASP 54 mmHg  TTE 07/08/2022: LVEF 55%  LVIDd 4 5 cm  Grade 2 DD  Normal RV  Severely calcified AV with critical AS (mean gradient 45 mmHg; MILE 0 55 cm^2)  Moderate to severe MR  Mild TR  PASP 50 mmHg  Weight of 107 lbs on 07/29  Today, weighs 114 lbs  Most recent BMP from 08/05/2022: sodium 136; potassium 5 3 (slightly hemolyzed); BUN 67; creatinine 1 71; eGFR 25  Neurohormonal Blockade:  --Beta Blocker: metoprolol tartrate 125 mg q12 hours  --ARNi / ACEi / ARB: No    --Aldosterone Antagonist: No    --SGLT2 Inhibitor: No    --Diuretic: Lasix 40 mg daily  Sudden Cardiac Death Risk Reduction:   --LVEF >35%  Electrical Resynchronization:  --Candidacy for BiV device: intermittent LBBB with -150 ms  Atrial fibrillation   [id number] = 7 (age, sex, HF, HTN, CAD, DM)  Anticoagulation on Eliquis (renal dose)  Diagnosed 07/2022  Rate control: digoxin 125 mcg QOD and BB as above  Rhythm control: No     Aortic stenosis, critical   TTE 07/08/2022: Severely calcified AV with critical AS (mean gradient 45 mmHg; MILE 0 55 cm^2)  Cancelled appt with cardiothoracic surgery on 08/08/2022 for possible TAVR  Now wanting to pursue on medical management alone  Coronary artery disease   Without active chest pain  History of MI at age 27; unclear details/interventions  Continue on statin and BB as above  Hypertension   BP of 110/70 mmHg in office today  Continue on medications as above  Chronic kidney disease, stage IIIa   Baseline creatinine of 0 8-1 1  Most recent BMP from 08/05/2022: sodium 136; potassium 5 3 (slightly hemolyzed); BUN 67; creatinine 1 71; eGFR 25  Hyperlipidemia  Diabetes mellitus, type II  Hypothyroidism  Anxiety    HPI:   Jodi Bassett is a 15-year-old woman with a PMH as above who presents to the office for hospital follow-up  Admitted to [facility] from 07/21 to 07/24/2022 after presenting with worsening SOB   EKG with Afib with RVR on 07/21 (new diagnosis)  Was started on diltiazem drip and Eliquis, and BB dose increase to wean off drip  Did not require IV diuretics this admission  Amlodipine and lisinopril discontinued  Lost 1 lbs and net positive this admission  07/29/2022: Patient presents with her two daughters for hospital follow-up  Primary complaint today of fatigue and decreased endurance  Denies SOB at rest but report increased need for breaks when ambulating  Did miss one dose of digoxin this week, Did take AM meds today  Currently lives alone  Does enjoy eating salty crackers  Daughter has noted mild decrease in appetite since Afib diagnosis  Discussed TAVR workup and briefly reviewed TAVR course  Patient hesitant to idea of undergoing TAVR but agreeable to meet with CTS to discuss and get more information  Currently lives alone but is considering moving to assisted living facility in near future  Was to see CT surgery to discuss TAVR; appt cancelled via [employer]  Presented to [facility] ED on 08/[facility] via EMS with worsening STEVENSON and 3 lbs weight gain  EKG with Afib, rates in 100-110s and normotensive  Troponin unremarkable  BNP 1554 (up from 436 in 07/2022)  Did reports missing few doses of medications over past few days  Was offered admission for further monitoring and short course of inpatient diuresis; patient and her family declined  Advised by PCP on 08/11 to double diuretic and potassium dose due to 3 lbs weight gain  08/12/2022: Patient presents with her daughters for follow-up  Up 7 lbs since last visit  Reviewed home weight log which also shows rapid weight gain  Patient no complaints; however, upon further questioning does endorse mild STEVENSON and fatigue  Patient's daughters have noticed worsening STEVENSON since last visit as well as worsening memory/ confusion  Continues with decreased appetite   Canceled CT surgery appointment to discuss TAVR as patient and her family are no longer interested in pursuing surgical intervention and wished to continue on medical management alone  Introduced and discussed palliative care briefly patient and family members  Also discussed difference between palliative care and hospice  Of note, patient's daughter did reach out to [facility] [place] [facility] to obtain general informationand reviewed patient's medical history with liaison  Reviewed common signs and symptoms related to severe untreated AS and also discussed progression of disease  Patient states that she is grateful for the life that she lived and also states that she is "ready to go when that time comes "     Past Medical History:   Diagnosis Date    A-fib [facility])     Anxiety     Coronary artery disease     MI at age 27    Diabetes [facility])     as per stefani    Disease of thyroid gland     HL (hearing loss)     Wears hearing aid, right    Hyperlipidemia     Hypertension     as per Karole Cue       Review of Systems   Constitutional: Positive for unexpected weight change  Negative for activity change, appetite change, fatigue and fever  HENT: Negative for congestion, postnasal drip, rhinorrhea, sneezing, sore throat and trouble swallowing  Eyes: Negative  Respiratory: Positive for shortness of breath  Negative for cough and chest tightness  Cardiovascular: Positive for leg swelling  Negative for chest pain and palpitations  Gastrointestinal: Negative for abdominal pain, diarrhea, nausea and vomiting  Endocrine: Negative  Genitourinary: Negative for decreased urine volume, difficulty urinating, dysuria and urgency  Musculoskeletal: Negative  Skin: Negative  Allergic/Immunologic: Negative  Neurological: Negative for dizziness, tremors, syncope, weakness, light-headedness and headaches  Hematological: Negative  Psychiatric/Behavioral: Negative for agitation, confusion and sleep disturbance  The patient is not nervous/anxious      14-point ROS completed and negative except as stated above and/or in the HPI  No Known Allergies    Current Outpatient Medications:     ALPRAZolam (XANAX) 0 25 mg tablet, TAKE ONE TABLET BY MOUTH EVERY DAY AS NEEDED, Disp: 30 tablet, Rfl: 5    apixaban (ELIQUIS) 2 5 mg, Take 1 tablet (2 5 mg total) by mouth 2 (two) times a day, Disp: 180 tablet, Rfl: 1    atorvastatin (LIPITOR) 40 mg tablet, TAKE ONE TABLET BY MOUTH EVERY DAY, Disp: 90 tablet, Rfl: 3    cholecalciferol (VITAMIN D3) 1,000 units tablet, Take 1,000 Units by mouth daily, Disp: , Rfl:     digoxin (LANOXIN) 0 125 mg tablet, Take 1 tablet (125 mcg total) by mouth every other day, Disp: 45 tablet, Rfl: 0    fluocinolone acetonide (DERMOTIC) 0 01 % otic oil, Administer 5 drops into both ears 2 (two) times a day as needed (itching), Disp: 20 mL, Rfl: 3    furosemide (LASIX) 40 mg tablet, Take 1 tablet (40 mg total) by mouth 2 (two) times a day Once weights come down/symptoms improve, decrease to 40 mg daily  , Disp: 180 tablet, Rfl: 1    glimepiride (AMARYL) 1 mg tablet, Take 1 tablet (1 mg total) by mouth daily with dinner, Disp: 30 tablet, Rfl: 5    levothyroxine 25 mcg tablet, TAKE 2 TABLETS BY MOUTH daily other than M and W which you take 1 daily, Disp: 180 tablet, Rfl: 1    metoprolol tartrate (LOPRESSOR) 100 mg tablet, Take 1 tablet (100 mg total) by mouth every 12 (twelve) hours, Disp: 180 tablet, Rfl: 1    metoprolol tartrate (LOPRESSOR) 25 mg tablet, Take 1 tablet (25 mg total) by mouth every 12 (twelve) hours In addition to 100 mg tablets for total of 125 mg every 12 hours  , Disp: 180 tablet, Rfl: 1    vitamin B-12 (VITAMIN B-12) 1,000 mcg tablet, Take by mouth daily, Disp: , Rfl:     Blood Glucose Monitoring Suppl (OneTouch Verio Reflect) w/Device KIT, uad once daily, Disp: 1 kit, Rfl: 0    glucose blood (OneTouch Verio) test strip, Use as instructed once daily, Disp: 100 strip, Rfl: 11    Lancets (freestyle) lancets, TEST BLOOD SUGAR ONCE IN THE MORNING, Disp: 100 each, Rfl: 11    Social History Socioeconomic History    Marital status:      Spouse name: Not on file    Number of children: Not on file    Years of education: Not on file    Highest education level: Not on file   Occupational History    Not on file   Tobacco Use    Smoking status: Never Smoker    Smokeless tobacco: Never Used   Vaping Use    Vaping Use: Never used   Substance and Sexual Activity    Alcohol use: Yes     Comment: occasional     Drug use: Never    Sexual activity: Not Currently   Other Topics Concern    Not on file   Social History Narrative    · Do you currently or have you served in [employer]:   No      · Were you activated, into active duty, as a member of the [employer] or as a Reservist:   No      · Live alone or with others:   alone      · Exercise level:   Occasional      · Overweight:   Yes      · Obese:   No      · General stress level:   Medium      · Diet:   Regular      · Advance directive: Yes      · Caffeine intake: Moderate      · Guns present in home: Yes      · Seat belts used routinely:   Yes      · Sexual orientation:   Heterosexual      · Sunscreen used routinely:   No      · Seat belt/car seat used routinely:   Yes      · Do you have regular medical check ups: Yes      · Do you have regular dental check ups: Yes      · Education:   12      · Smoke alarm in home: Yes      · Salt Intake:   regular      ·      Social Determinants of Health     Financial Resource Strain: Not on file   Food Insecurity: No Food Insecurity    Worried About Running Out of Food in the Last Year: Never true    Dre of Food in the Last Year: Never true   Transportation Needs: No Transportation Needs    Lack of Transportation (Medical): No    Lack of Transportation (Non-Medical):  No   Physical Activity: Not on file   Stress: Not on file   Social Connections: Not on file   Intimate Partner Violence: Not on file   Housing Stability: Low Risk     Unable to Pay for Housing in the Last Year: No    Number of Places Lived in the Last Year: 1    Unstable Housing in the Last Year: No     Family History   Problem Relation Age of Onset    Parkinsonism Mother     Heart attack Father     Coronary artery disease Sister         arteriosclerosis         Vitals:   Blood pressure 110/70, pulse 83, height 5' (1 524 m), weight 52 kg (114 lb 11 2 oz), SpO2 97 %  Body mass index is 22 4 kg/m²  Wt Readings from Last 10 Encounters:   08/12/22 52 kg (114 lb 11 2 oz)   08/10/22 49 9 kg (110 lb)   08/04/22 49 9 kg (110 lb)   07/29/22 48 7 kg (107 lb 4 8 oz)   07/24/22 49 kg (108 lb)   07/09/22 50 4 kg (111 lb 1 8 oz)   05/03/22 52 2 kg (115 lb)   04/27/22 54 kg (119 lb)   02/24/22 54 kg (119 lb)   01/26/22 54 kg (119 lb)     Vitals:    08/12/22 1251   BP: 110/70   BP Location: Right arm   Patient Position: Sitting   Cuff Size: Child   Pulse: 83   SpO2: 97%   Weight: 52 kg (114 lb 11 2 oz)   Height: 5' (1 524 m)       Physical Exam  Vitals reviewed  Constitutional:       General: She is awake  She is not in acute distress  Appearance: Normal appearance  She is well-developed and normal weight  She is not toxic-appearing or diaphoretic  Comments: Appears frail   HENT:      Head: Normocephalic  Nose: Nose normal       Mouth/Throat:      Mouth: Mucous membranes are moist    Eyes:      General: No scleral icterus  Conjunctiva/sclera: Conjunctivae normal    Neck:      Vascular: JVD present  Trachea: No tracheal deviation  Cardiovascular:      Rate and Rhythm: Normal rate  Rhythm irregularly irregular  No extrasystoles are present  Pulses: Normal pulses  Heart sounds: Murmur heard  Systolic murmur is present  Pulmonary:      Effort: Pulmonary effort is normal  No tachypnea, bradypnea or respiratory distress  Breath sounds: Normal air entry  No decreased air movement  No decreased breath sounds or wheezing     Abdominal:      General: Bowel sounds are normal  There is distension  Palpations: Abdomen is soft  Tenderness: There is no abdominal tenderness  Musculoskeletal:      Cervical back: Neck supple  Right lower le+ Pitting Edema present  Left lower le+ Pitting Edema present  Skin:     General: Skin is warm and dry  Coloration: Skin is pale  Skin is not jaundiced  Neurological:      General: No focal deficit present  Mental Status: She is alert and oriented to person, place, and time  Psychiatric:         Attention and Perception: Attention normal          Mood and Affect: Mood and affect normal          Speech: Speech normal          Behavior: Behavior normal  Behavior is cooperative  Thought Content:  Thought content normal      Labs & Results:  Lab Results   Component Value Date    WBC 11 54 (H) 2022    HGB 12 0 2022    HCT 39 4 2022    MCV 99 (H) 2022     2022     Lab Results   Component Value Date    SODIUM 136 2022    K 5 3 2022     2022    CO2 21 2022    BUN 67 (H) 2022    CREATININE 1 71 (H) 2022    GLUC 165 (H) 2022    CALCIUM 9 0 2022     Lab Results   Component Value Date    INR 1 84 (H) 2022    INR 0 94 2022    PROTIME 21 5 (H) 2022    PROTIME 12 6 2022     Lab Results   Component Value Date    NTBNP 3,327 (H) 2022      Lab Results   Component Value Date    BNP 1,554 (H) 2022      Quan Taveras PA-C

## 2022-08-12 NOTE — TELEPHONE ENCOUNTER
Pt's daughter called asking for a script for a fold up walker with a seat because she gets out of breath       Please fax order to:    Fax - [phone] [address] [facility]

## 2022-08-12 NOTE — PATIENT INSTRUCTIONS
Increase Lasix to 40 mg twice a day for next 3-7 days  Once weights return to baseline and/or symptoms improve, can return to 40 mg daily  Please weigh yourself every day and keep a detailed log of weights  Contact the Heart Failure program at [phone] if you gain 3 lbs overnight or 5 lbs in 5-7 days  Limit daily sodium/salt intake to 5930-6187 mg daily to prevent fluid retention  Avoid canned foods, fast food/Chinese food, and processed meats (hot dogs, lunch meat, and sausage etc )  Limit fluid intake to 2000 mL or 2L (about 60-65 ounces) daily  Bring complete list of medications and log of daily weights to your follow-up appointment  Palliative Care   AMBULATORY CARE:   Palliative care  is specialized care to help you manage a serious, long-term health condition  Care can begin at any stage of your illness  Palliative care helps prevent or relieve any suffering you may have  The care also improves quality of life for you and your family  A palliative care team treats the whole person, not the illness  Your palliative care team will work with your healthcare providers as they treat your illness  Care will be specific to your needs and the needs of your family  Care can be provided in a hospital or at home  It can also be provided in a long-term care facility or outpatient clinic  Who provides palliative care: Your palliative care team may include any of the following:  Doctors, nurses, and dietitians    Physical and occupational therapists    Pain specialists and pharmacists    Chaplains, social workers, and counselors    What you need to know about your treatment and care decisions: You define your goals for care  You may want any treatment that might improve your condition or treat the illness  You may want rehabilitation so you can do daily activities more easily  You may only want care that helps you live as long as possible or relieves your symptoms      You decide the treatment you want, based on your goals  Your team can help you make a care plan to meet your goals  Your care plan will include the treatments you want and for how long you want them to continue  Care may change if your condition or goals for care change  The plan will be used where you are currently receiving care  If you move to another place, your plan will move with you  This will help your new care providers know your wishes  Treatment may include any of the following:     A blood transfusion if needed    CPR or ventilation if you stop breathing or your heart stops    Dialysis to clean your blood if your kidneys stop working    IV fluids or tube feeding to give fluids and nutrition if you cannot swallow    Antibiotics to treat an infection, or other medicines such as pain or nausea medicine    Treatment that requires a transfer to another place, such as a hospital    You can choose a person to make decisions for you  The person can be a family member or friend  If you are not able to make decisions, the person works with your team to provide the care you want  How your symptoms may be managed:  Symptoms such as pain or shortness of breath may be caused by your illness  You may have side effects from treatment, such as nausea, fatigue, or constipation  The following are ways palliative care can help prevent or treat symptoms:  Pain  may be relieved with medicine  You may instead choose other methods, such as massage, music, or aromatherapy  Your team will ask how the pain is affecting your quality of life  You can describe where and how often you feel pain, and when it started  You can describe the pain as sharp, achy, or throbbing  You can also describe how bad it is  This may be on a pain scale from 0 to 10, or by choosing a face on a pain scale picture  Your answers will help them relieve your pain with the method you choose  Shortness of breath  may be relieved with extra oxygen or breathing treatments   You can describe any problems with breathing  You may feel short of breath all the time, or only with activity  You may feel a little winded, or like you are struggling to breathe  Treatment options depend on what is causing your shortness of breath  Your care team will work with you to define your shortness of breath and decide on the treatment or relief you want  Other symptoms  can be relieved or treated to improve your quality of life  Examples include nausea, fatigue, and sleep problems  Your skin can be treated or soothed if it is irritated or you develop a pressure injury from lying in bed  Your care team will work with you to relieve any symptoms you choose  Support services offered in palliative care: Your team is available 24 hours a day, 7 days a week for support  You may be able to speak to them by phone, in person, or through a patient portal application (enid)  Treatment support  is given to help you and your family understand your condition and explore treatment options  Your team can answer any questions you or family members have  This includes questions about your condition and your treatment options  You define what quality of life means to you and your family  Emotional and psychological support  helps you and those close to you cope with feelings about your condition  Patients and their families may join support groups or meet others in similar situations  Practical support  assists with concerns such as employment and legal issues  It may also include financial counseling  Your  can help you find services that fit your needs and your family's needs  Spiritual and cultural support  helps you and your family evaluate Confucianist values and cultural beliefs  This may make it easier to understand and accept your condition  Transition support  can help you, your family, and friends with a change in your condition   Transition support can help you readjust to daily life if your condition improves  Your palliative care team will also continue to help if you need end-of-life care  For support and more information:   [address] [facility])  [address], [address]  [place] , [address] [address]  Phone: [phone]  Web Address: [employer]     [address] [facility], [address]  [place] , [address] [facility]  Phone: [phone]  Web Address: http://[employer]/  [address] Information is for End User's use only and may not be sold, redistributed or otherwise used for commercial purposes  All illustrations and images included in CareNotes® are the copyrighted property of A D A M , Inc  or [facility] [employer]   The above information is an  only  It is not intended as medical advice for individual conditions or treatments  Talk to your doctor, nurse or pharmacist before following any medical regimen to see if it is safe and effective for you

## 2022-08-15 NOTE — TELEPHONE ENCOUNTER
If patient still feeling SOB and legs remain edematous, can try further increasing Lasix to 60 mg BID  Would advise contacting PCP about confusion as not Lasix not likely to result in described symptoms

## 2022-08-15 NOTE — TELEPHONE ENCOUNTER
Call from daughter Josephine  At ov on 8/12, Mrs Cici Tyler was advised to increase lasix to 40 mg BID for 5-7 days or until weight down  Daughter wants you to know that her weight is the same and has not decreased at all  Weight 114 2 lbs  Has been 114 at home  Daughter's other concern is that patient is very confused since the ov  She said there has been a complete change in her mental status  She did not reach out to the PCP regarding this, but thought it was due to the increase in the lasix  /70  HR 83     Pt's next visit is with Dr Val Kinsey on 9/13  Daughter asked for you to call her  I told her I would relay a message to you regarding her concerns       Aliyah Burton:  [phone]

## 2022-08-15 NOTE — TELEPHONE ENCOUNTER
I spoke with patients daughter regarding her Moms increase in fluid retention  Patient was seen on Friday by cardiology and lasix was increased  Daughter's are just concerned because Estrella Barron is more confused than normal stand up and does not know what to do  Daughter was advised to take Estrella Barron to the ER to be worked up  Patients daughter was agreeable

## 2022-08-15 NOTE — TELEPHONE ENCOUNTER
Daughter called back to clarify the dose to give of Lasix  She thought her mother was taking 80 mg bid  After talking with her she thinks the pill dose is 20 mg and she is giving 2 pills/ bid- that would be 40 mg bid  She will increase her dose to 60 mg bid

## 2022-08-16 PROBLEM — G92.8 TOXIC METABOLIC ENCEPHALOPATHY: Status: ACTIVE | Noted: 2022-01-01

## 2022-08-16 PROBLEM — Z71.89 GOALS OF CARE, COUNSELING/DISCUSSION: Status: ACTIVE | Noted: 2022-01-01
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
## 2022-08-16 PROBLEM — N17.9 ACUTE KIDNEY INJURY SUPERIMPOSED ON CHRONIC KIDNEY DISEASE (HCC): Status: ACTIVE | Noted: 2022-01-01

## 2022-08-16 PROBLEM — I50.9 CHRONIC HEART FAILURE (HCC): Status: ACTIVE | Noted: 2022-01-01

## 2022-08-16 PROBLEM — I50.32 CHRONIC DIASTOLIC HEART FAILURE (HCC): Status: ACTIVE | Noted: 2022-01-01

## 2022-08-16 PROBLEM — N18.9 ACUTE KIDNEY INJURY SUPERIMPOSED ON CHRONIC KIDNEY DISEASE (HCC): Status: ACTIVE | Noted: 2022-01-01

## 2022-08-16 NOTE — ASSESSMENT & PLAN NOTE
· Recently diagnosed and placed on metoprolol, digoxin and Eliquis  After discussion with the patient and in reviewing her blood pressure, recent renal function etc ,  I would recommend discontinuing Eliquis and digoxin and decreasing metoprolol    Family in agreement

## 2022-08-16 NOTE — CONSULTS
[address] 9/17/1929, 80 y o  female MRN: [id number]  Unit/Bed#: ED 28 Encounter: [id number]  Primary Care Provider: Carrie Oden MD   Date and time admitted to hospital: 8/16/2022 10:20 AM  Please see other consult note that was already performed earlier today    Inpatient consult to Internal Medicine  Consult performed by: Brian Khan PA-C  Consult ordered by: Amberly Low PA-C        Goals of care, counseling/discussion  Assessment & Plan  · Extensive discussion with patient and daughters at bedside  Please also see ACP note  Patient does not wish to be admitted at this time  She is without complaints of pain or distress, aware of her limited life prognosis in the setting of inoperable critical aortic stenosis, and wishes to be empowered to make her own decisions and to return home  Spoke with case management, trauma and my attending and reviewed all risks and benefits of this decision with family at bedside  Family reports only concern to be her issues with insomnia and I have agreed to offer a prescription for trazodone given that she has failed melatonin and Xanax before    We have also reviewed her list of medications and made adjustments as appropriate

## 2022-08-16 NOTE — ASSESSMENT & PLAN NOTE
· Patient sustained a fall while on Eliquis therefore was seen and evaluated by Trauma and cleared with no severe injuries noted  Per discussion with family and patient, after reviewing risks and benefits, patient will discontinue Eliquis  · Patient has no pain and does not wish additional workup

## 2022-08-16 NOTE — ASSESSMENT & PLAN NOTE
Wt Readings from Last 3 Encounters:   08/16/22 52 5 kg (115 lb 11 9 oz)   08/12/22 52 kg (114 lb 11 2 oz)   08/10/22 49 9 kg (110 lb)       - Patient with chronic diastolic CHF and severe aortic stenosis, now with apparent volume overload not responsive to oral diuretic therapy  - Medical management per Internal Medicine

## 2022-08-16 NOTE — PROCEDURES
POC FAST US    Date/Time: 8/16/2022 11:36 AM  Performed by: Berkley Klein PA-C  Authorized by:  Berkley Klein PA-C     Patient location:  Trauma  Procedure details:     Exam Type:  Diagnostic    Indications: blunt abdominal trauma and blunt chest trauma      Assess for:  Intra-abdominal fluid, pericardial effusion and pneumothorax    Technique: extended FAST      Views obtained:  Heart - Pericardial sac, LUQ - Splenorenal space, Suprapubic - Pouch of Doyle, RUQ - Flor's Pouch, Left thorax and Right thorax    Image quality: diagnostic      Image availability:  Images available in PACS and video obtained  FAST Findings:     RUQ (Hepatorenal) free fluid: absent      LUQ (Splenorenal) free fluid: absent      Suprapubic free fluid: absent      Cardiac wall motion: identified      Pericardial effusion: absent    extended FAST (Pulmonary) findings:     Left lung sliding: Present      Right lung sliding: Present    Interpretation:     Impressions: negative

## 2022-08-16 NOTE — TELEPHONE ENCOUNTER
Daughter called again today  She states pt fell getting out of bed today and it hurts to breath    I told her to take her to ER asap or call squad

## 2022-08-16 NOTE — ASSESSMENT & PLAN NOTE
- Patient with severe aortic stenosis at baseline resulting in CHF which now appears to be exacerbated by volume overload  - Medical management per Internal Medicine

## 2022-08-16 NOTE — ASSESSMENT & PLAN NOTE
· Known history of critical aortic stenosis followed by Cardiology  Per recent visit there is no plans for surgical intervention    Palliative care referral was offered during her last cardiology visit

## 2022-08-16 NOTE — QUICK NOTE
Cervical Collar Clearance: The patient had a CT scan of the cervical spine demonstrating no acute injury  On exam, the patient had no midline point tenderness or paresthesias/numbness/weakness in the extremities  The patient had full range of motion (was then able to flex, extend, and rotate head laterally) without pain  There were no distracting injuries and the patient was not intoxicated  The patient's cervical spine was cleared radiologically and clinically  Cervical collar removed at this time       Astrid Greenfield PA-C  8/16/2022 12:17 PM

## 2022-08-16 NOTE — CASE MANAGEMENT
Case Management Assessment & Discharge Planning Note    Patient name Jodi Bassett  Location ED 28/ED 28 MRN [id number]  : 1929 Date 2022       Current Admission Date: 2022  Current Admission Diagnosis:Toxic metabolic encephalopathy   Patient Active Problem List    Diagnosis Date Noted    Acute kidney injury superimposed on chronic kidney disease ([facility] [address] ) 2022    Fall 2022    Toxic metabolic encephalopathy     Chronic diastolic heart failure ([facility] ) 2022    Goals of care, counseling/discussion 2022    Elevated WBC count 2022    [facility]rial fibrillation [facility] ) 2022    Aortic valvular disease 2022    Pulmonary edema cardiac cause ([facility] ) 2022    Dementia without behavioral disturbance, unspecified dementia type ([place] [address] ) 2022    Pulmonary hypertension ([place] [address] ) 2021    Type 2 diabetes mellitus without complication, without long-term current use of insulin ([place] [address] ) 2020    Other specified hypothyroidism 2020    Coronary artery disease 2020    HL (hearing loss) 2020    Anxiety 2020    Slow transit constipation 2020    Age-related osteoporosis without current pathological fracture 2020    Severe aortic stenosis 2020    Hyperlipidemia 2013    Hypertension 2013    Asymptomatic bilateral carotid artery stenosis 2013      LOS (days): 0  Geometric Mean LOS (GMLOS) (days): 0 00  Days to GMLOS:     OBJECTIVE:  PATIENT READMITTED TO HOSPITAL            Current admission status: Inpatient  Referral Reason: Hospice    Preferred Pharmacy:   [facility] [address] [place] [address]  Phone: [phone] Fax: [phone]    Primary Care Provider: Jessica Brothers MD    Primary Insurance: Riaz Espinoza [facility]  Secondary Insurance:     ASSESSMENT:  Kareen Martins Proxies    There are no active Health Care Proxies on file        Advance Directives  Does patient have a [address]?: Yes  Does patient have Advance Directives?: Yes  Advance Directives: Power of  for health care  Primary Contact: daughters, Hola Addison and Lacy Castanon              Patient Information  Admitted from[de-identified] Home  Mental Status: Alert  During Assessment patient was accompanied by: Daughter Yasmeen Stiles and Lacy Castanon at bedside)  Assessment information provided by[de-identified] Patient, Daughter  Primary Caregiver: Family (daughters)  Caregiver's Name[de-identified] Hola Addison and [address]: Family members, Private Caregivers Alissa Jaleel - Tuesday, Weds and Friday - 9am - 11am, but family reaching out to increase services)  [place] of Residence: [place]  Type of Current Residence: [facility]  In the last 12 months, was there a time when you were not able to pay the mortgage or rent on time?: No  In the last 12 months, how many places have you lived?: 1  In the last 12 months, was there a time when you did not have a steady place to sleep or slept in a shelter (including now)?: No  Homeless/housing insecurity resource given?: N/A  Living Arrangements: Lives Alone    Activities of Daily Living Prior to Admission  Functional Status: Independent  Completes ADLs independently?: Yes  Ambulates independently?: Yes  Does patient use assisted devices?: Yes  Assisted Devices (DME) used: Rollator  Does patient currently own DME?: Yes  What DME does the patient currently own?: Rollator  Does patient have a history of Outpatient Therapy (PT/OT)?: No  Does the patient have a history of Short-Term Rehab?: No  Does patient have a history of HHC?: Yes  Does patient currently have [facility]?: Yes [facility])    [facility]  Type of Current Home Care Services: Nurse visit  [address][de-identified] Other (please enter name in comment) ([facility])  [address] [facility] Provider[de-identified] PCP    Patient Information Continued  Does patient have prescription coverage?: Yes  Within the past 12 months, you worried that your food would run out before you got the money to buy more : Never true  Within the past 12 months, the food you bought just didn't last and you didn't have money to get more : Never true  Food insecurity resource given?: N/A  Does patient receive dialysis treatments?: No  Does patient have a history of substance abuse?: No  Does patient have a history of Mental Health Diagnosis?: No         Means of Transportation  Means of Transport to Appts[de-identified] Family transport  In the past 12 months, has lack of transportation kept you from medical appointments or from getting medications?: No  In the past 12 months, has lack of transportation kept you from meetings, work, or from getting things needed for daily living?: No  Was application for public transport provided?: N/A        DISCHARGE DETAILS:    Discharge planning discussed with[de-identified] patient and daughters, Evelyne Nuñez and Zoie Tripathi, at bedside  [place] of Choice: Yes (re: hospice services)  Comments - [place] of Choice: current with [facility], but family requesting referral to [address] [place] [address] for home hospice services  CM contacted family/caregiver?: Yes (family at bedside)  Were Treatment Team discharge recommendations reviewed with patient/caregiver?: Yes  Did patient/caregiver verbalize understanding of patient care needs?: Yes  Were patient/caregiver advised of the risks associated with not following Treatment Team discharge recommendations?: Yes    Contacts  Patient Contacts: daughters, Evelyne Nuñez and Zoie Tripathi  Relationship to Patient[de-identified] Family  Contact Method:  In Person  Reason/Outcome: Continuity of Care, Discharge Planning, Emergency Contact, Referral    Requested [address]         Is the patient interested in [place] [address] at discharge?: No    DME Referral Provided  Referral made for DME?: No    Other Referral/Resources/Interventions Provided:  Interventions: Hospice  Referral Comments: Patient to the ED due to toxic metabolic encephalopathy  TT received from [facility] relaying request to arrange home hospice for patient  Per [facility], goals of care discussed and patient/family declining hospital admission and requesting home hospice services  Met with patient and her daughters (both have POA) Dequan Coronel and Mili Tee at bedside to discuss same  Family reports that patient lives alone and they had recently arranged private home care services through [facility] - Tuesday, [place] and Friday from 9am-11am  Daughter, Dequan Coronel, lives nearby and is often available to help  Mili Tee lives about an hour away, in the [address], but can also be available at times  Family reports they plan to reach out to [facility] to discuss increasing home aide hours at this time  Report that they feel family is able to support patient's care needs at this time until additional services can be put in place  Patient is current with [facility]  Explained difference between home care services and home hospice  Family confirmed interest in home hospice services at this time  Discussed hospice benefit in detail, including staff coverage - RN, aide, and supplemental support services (spiritual support, social work, etc)- visit frequency, DME and medication coverage  Explained Medicare's hospice benefit, including benefit periods and coverage details  Family aware that hospice does not provide 24hr care in the home and that services are based on patient needs and symptom management  Family aware that aggressive treatment/interventions are all discontinued on hospice service in lieu of comfort-based approaches  Family confirms that they want hospice services to be arranged and requesting [facility] to [facility]  Referral sent in [address] and TT sent to hospice liaison  Liaison reports they are unable to see patient tomorrow, but can possibly provide Margi for Thursday, 8/18, at patient's home  Family aware and in agreement to same   At this time, patient has a rollator at home; do not feel that additional DME is needed at this time  Information for [facility] placed on AVS and family aware that RN will be in touch to schedule in-home visit for Thursday  PCP is Dr Rama Kruse  Family plans to transport patient home one d/c orders received  TT sent to [facility] to relay above  Discharge plan is home with hospice services through [facility]           Treatment Team Recommendation: Home, Hospice  Discharge Destination Plan[de-identified] Home, Hospice ([facility])  Transport at Discharge : Family

## 2022-08-16 NOTE — ED PROVIDER NOTES
Emergency Department Airway Evaluation and Management Form    History  Obtained from: pt  Patient has no known allergies  Chief Complaint   Patient presents with   [address]     Per ems patient had first day with home health nurse and nurse reported fall onto left side -headstrike +eliquis   Pt reports getting out of bed and falling   Per ems family states patient has had increased confusion since her lasik surgery      HPI     80year-old fall on Eliquis    Airway intact, rest of evaluation treatment by trauma team  Past Medical History:   Diagnosis Date    A-fib ([address] )     Anxiety     Coronary artery disease     MI at age 27    Diabetes [facility])     as per stefani    Disease of thyroid gland     HL (hearing loss)     Wears hearing aid, right    Hyperlipidemia     Hypertension     as per [place]     Past Surgical History:   Procedure Laterality Date    ADENOIDECTOMY      APPENDECTOMY      as per stefani    EYE SURGERY Bilateral 2015    as per [place]    STAPEDECTOMY      Metal STAPES LEFT ear (can not have an MRI)- as per [place]    TONSILLECTOMY      as per [place] Am[place]     Family History   Problem Relation Age of Onset    Parkinsonism Mother     Heart attack Father     Coronary artery disease Sister         arteriosclerosis       Social History     Tobacco Use    Smoking status: Never Smoker    Smokeless tobacco: Never Used   Vaping Use    Vaping Use: Never used   Substance Use Topics    Alcohol use: Yes     Comment: occasional     Drug use: Never     I have reviewed and agree with the history as documented      Review of Systems    Physical Exam  /87 (BP Location: Right arm)   Pulse 86   Resp 18   Wt 52 5 kg (115 lb 11 9 oz)   SpO2 95%   BMI 22 60 kg/m²     Physical Exam    ED Medications  Medications - No data to display    Intubation  Procedures    Notes      Final Diagnosis  Final diagnoses:   None       ED Provider  Electronically Signed by     Nunu Freitas MD  08/16/22 [id number]

## 2022-08-16 NOTE — CASE MANAGEMENT
CM responded to trauma alert  Patient transported to ED by family and then moved to trauma bay for further evaluation  Patient responsive to medical team questions and instructions  Cm informed that patient was assigned to room and family in room  Cm met with blanca's daughter Reina Hammond in ED room  Per Reina Hammond, patient lives alone and that she noticed a change in patient last Friday after her lasix dose was changed due to increase weight  Per daughter she noted change due to patient's increase weight  Cm provided information to Trauma AP  No current identified CM needs  CM will follow and update screening, assessment, and discharge planning as appropriate

## 2022-08-16 NOTE — CONSULTS
[address] 9/17/1929, 80 y o  female MRN: [id number]  Unit/Bed#: ED 28 Encounter: [id number]  Primary Care Provider: Suni Falcon MD   Date and time admitted to hospital: 8/16/2022 10:20 AM      * Toxic metabolic encephalopathy  Assessment & Plan  · Patient was brought in with reports of progressively worsening mental status over the past several weeks following changes in her cardiac medications  Suspect this is toxic metabolic/multifactorial in the setting of polypharmacy, acute kidney injury, possible hypotension and hypoglycemia        Goals of care, counseling/discussion  Assessment & Plan  · Extensive discussion with patient and daughters at bedside  Please also see ACP note  Patient does not wish to be admitted at this time  She is without complaints of pain or distress, aware of her limited life prognosis in the setting of inoperable critical aortic stenosis, and wishes to be empowered to make her own decisions and to return home  Spoke with case management, trauma and my attending and reviewed all risks and benefits of this decision with family at bedside  Family reports only concern to be her issues with insomnia and I have agreed to offer a prescription for trazodone given that she has failed melatonin and Xanax before  We have also reviewed her list of medications and made adjustments as appropriate    [address]  · Patient sustained a fall while on Eliquis therefore was seen and evaluated by Trauma and cleared with no severe injuries noted  Per discussion with family and patient, after reviewing risks and benefits, patient will discontinue Eliquis  · Patient has no pain and does not wish additional workup       Acute kidney injury superimposed on chronic kidney disease [facility])  Assessment & Plan  Lab Results   Component Value Date    EGFR 25 08/05/2022    EGFR 20 08/02/2022    EGFR 45 07/23/2022    CREATININE 1 71 (H) 08/05/2022 CREATININE 2 09 (H) 08/02/2022    CREATININE 1 06 07/23/2022   · Baseline creatinine noted to be approximately 1 0 but 2 weeks ago on August 2nd, significant KASSIE was noted at 2 09  Patient was put on higher dose of metoprolol and Lasix and repeat labs were not performed  Suspect she could have further progressive acute kidney injury  Offered patient the option to get blood work done today to assess where her kidney function is but she prefers not to stay and wishes to go home  · Recommend decreasing metoprolol and stopping Lasix at this time  · Avoid nephrotoxic agents and hypotension    Severe aortic stenosis  Assessment & Plan  · Known history of severe aortic stenosis followed by Cardiology  Per recent visit there is no plans for surgical intervention  Palliative care referral was offered during her last cardiology visit    Type 2 diabetes mellitus without complication, without long-term current use of insulin ([facility])  Assessment & Plan  Lab Results   Component Value Date    HGBA1C 6 2 (H) 07/22/2022     No results for input(s): POCGLU in the last 72 hours  Blood Sugar Average: Last 72 hrs:  ·  recent A1c on file reflects optimal control-recommend we stop all oral hypoglycemic agents to prevent hypoglycemia particularly in light of renal dysfunction and overall goals of comfort    Atrial fibrillation ([facility] [address] )  Assessment & Plan  · Recently diagnosed and placed on metoprolol, digoxin and Eliquis  After discussion with the patient and in reviewing her blood pressure, recent renal function etc ,  I would recommend discontinuing Eliquis and digoxin and decreasing metoprolol  Family in agreement    Chronic diastolic heart failure (HCC)  Assessment & Plan  Wt Readings from Last 3 Encounters:   08/16/22 52 5 kg (115 lb 11 9 oz)   08/12/22 52 kg (114 lb 11 2 oz)   08/10/22 49 9 kg (110 lb)   · Patient's weight was apparently noted to be rising and she was placed on increased dose of Lasix    However she was not actually reporting any increasing swelling or shortness of breath  · Recommend making Lasix available only as needed            VTE Prophylaxis:   n/a    Recommendations for Discharge:  · Discharge home today with hospice referral and what ever additional support services she is eligible for after discussion with case management  Patient does not need to sign out against medical advice as she is appropriate for home with comfort focused care and family is in agreement  They are planning to increase her hours for home health caregivers and provide additional supervision as well    Counseling / Coordination of Care Time: 90 minutes Greater than 50% of total time spent on patient counseling and coordination of care  Collaboration of Care: Were Recommendations Directly Discussed with Primary Treatment Team? Yes    History of Present Illness:  Lilian Hudson is a 80 y o  female who is originally seen by the trauma service due to fall while on blood thinners  She was initially intended for admission to the hospitalist team but did not wish to be admitted  As such we are consulted for opinion on overall medical status and plan  Patient does live alone but with frequent check ins by her daughter every morning  When her daughter came by today to see her she noted that her mom was sitting on the floor awake but reporting that at some point while it was dark she had fallen  The patient herself does not recall anything specifically about the fall, and does not remember having any loss of consciousness or tripping event  She states that all just happened so fast   She does not remember impact  She does not know if there was any head injury but reports no pain at this time    She denies any dizziness or lightheadedness    Her daughters at bedside reports that ever since she has recently run into some additional cardiac issues with AFib and with multiple adjustments in her medications including a recent increase in her Lasix, they do believe that her mental status has declined  They state that previously she was not good with names but more recently she will stand up in her house and say she doesn't know what she is supposed to be doing  Review of Systems:  Review of Systems   Unable to perform ROS: Mental status change   Respiratory: Negative for shortness of breath  Cardiovascular: Negative for chest pain  Musculoskeletal: Negative for arthralgias and back pain  Psychiatric/Behavioral: Positive for confusion and sleep disturbance  Negative for agitation, behavioral problems and hallucinations  The patient is not nervous/anxious and is not hyperactive  Family reports she has significant insomnia and will stay up walking around getting in and out of bed all night long  She has failed trial of xanax and melatonin both  Past Medical and Surgical History:   Past Medical History:   Diagnosis Date    A-fib ([facility] [address] )     Anxiety     Coronary artery disease     MI at age 27    Diabetes [facility])     as per [place]    Disease of thyroid gland     HL (hearing loss)     Wears hearing aid, right    Hyperlipidemia     Hypertension     as per [place]       Past Surgical History:   Procedure Laterality Date    ADENOIDECTOMY      APPENDECTOMY      as per [place]    EYE SURGERY Bilateral 2015    as per [place]    STAPEDECTOMY      Metal STAPES LEFT ear (can not have an MRI)- as per [facility] TONSILLECTOMY      as per [place]       Meds/Allergies:  all medications and allergies reviewed    Allergies: No Known Allergies    Social History:  Marital Status:    Substance Use History:   Social History     Substance and Sexual Activity   Alcohol Use Yes    Comment: occasional      Social History     Tobacco Use   Smoking Status Never Smoker   Smokeless Tobacco Never Used     Social History     Substance and Sexual Activity   Drug Use Never       Family History:  N/A    Physical Exam:   Vitals:   Blood Pressure: 127/88 (08/16/22 1415)  Pulse: 98 (08/16/22 1415)  Temp Source: Axillary (08/16/22 1136)  Respirations: 18 (08/16/22 1415)  Weight - Scale: 52 5 kg (115 lb 11 9 oz) (08/16/22 1136)  SpO2: 95 % (08/16/22 1415)    Physical Exam  Vitals reviewed  Constitutional:       General: She is not in acute distress  Appearance: She is not ill-appearing, toxic-appearing or diaphoretic  Comments: Elderly female seen lying comfortably in bed   Eyes:      General: No scleral icterus  Right eye: No discharge  Left eye: No discharge  Conjunctiva/sclera: Conjunctivae normal    Cardiovascular:      Rate and Rhythm: Normal rate  Rhythm irregular  Heart sounds: Murmur heard  Pulmonary:      Effort: No respiratory distress  Breath sounds: No stridor  No wheezing or rhonchi  Comments: Not requiring any oxygen  No cough, wheezing, dyspnea, tachypnea  Abdominal:      General: There is no distension  Tenderness: There is no guarding  Musculoskeletal:      Right lower leg: No edema  Left lower leg: No edema  Skin:     General: Skin is warm  Coloration: Skin is pale  Skin is not jaundiced  Findings: No bruising, erythema, lesion or rash  Neurological:      Mental Status: She is alert  Comments: Patient is awake alert pleasant and cooperative    She provides repetitive information but follows commands          Additional Data:   Lab Results:    Results from last 7 days   Lab Units 08/16/22  1142   I STAT HEMOGLOBIN g/dl 11 6   HEMATOCRIT, ISTAT % 34*     Results from last 7 days   Lab Units 08/16/22  1142   CO2, I-STAT mmol/L 21             Lab Results   Component Value Date/Time    HGBA1C 6 2 (H) 07/22/2022 12:28 AM    HGBA1C 6 3 05/03/2022 02:36 PM    HGBA1C 6 8 (H) 08/30/2021 09:15 AM    HGBA1C 7 6 (H) 05/07/2021 08:34 AM               Imaging: reviewed  TRAUMA - CT head wo contrast   Final Result by Samanta White MD (08/16 1212)      No acute intracranial abnormality  Stable chronic microangiopathic changes within the brain  I personally discussed this study with Dr Miriam Mcgraw on 8/16/2022 at 12:01 PM                            Workstation performed: [id number]         TRAUMA - CT spine cervical wo contrast   Final Result by Calvin Kern MD (08/16 1212)      No cervical spine fracture or traumatic malalignment  Cervical spondylosis  I personally discussed this study with Dr Miriam Mcgraw on 8/16/2022 at 12:08 PM                       Workstation performed: [id number]         XR trauma multiple   Final Result by Joseph Newman MD (08/16 3677)      No acute cardiopulmonary disease within limitations of supine imaging  Workstation performed: [id number]         XR chest portable    (Results Pending)       EKG, Pathology, and Other Studies Reviewed on Admission:   · Tele--afib    ** Please Note: This note may have been constructed using a voice recognition system   **

## 2022-08-16 NOTE — QUICK NOTE
Appreciate SL evaluation and recommendations  The SLIM provider had a discussion with the patient and family and completed and advanced care plan agreed upon by all parties  Ultimately, the patient and her family do not wish for admission and intend for patient to return home with home health care support already in place as well as a home hospice referral   Case Management notified and is working on hospice referral for discharge  SLIM reviewed patient's medications and made adjustments as appropriate      Patient to be discharged home with family support, home health services and home hospice referral     Noble Braxton [place]  8/16/2022 2:59 PM

## 2022-08-16 NOTE — ASSESSMENT & PLAN NOTE
Wt Readings from Last 3 Encounters:   08/16/22 52 5 kg (115 lb 11 9 oz)   08/12/22 52 kg (114 lb 11 2 oz)   08/10/22 49 9 kg (110 lb)   · Patient's weight was apparently noted to be rising and she was placed on increased dose of Lasix  However she was not actually reporting any increasing swelling or shortness of breath    · Recommend making Lasix available only as needed

## 2022-08-16 NOTE — ASSESSMENT & PLAN NOTE
· Extensive discussion with patient and daughters at bedside  Please also see ACP note  Patient does not wish to be admitted at this time  She is without complaints of pain or distress, aware of her limited life prognosis in the setting of inoperable critical aortic stenosis, and wishes to be empowered to make her own decisions and to return home  Spoke with case management, trauma and my attending and reviewed all risks and benefits of this decision with family at bedside  Family reports only concern to be her issues with insomnia and I have agreed to offer a prescription for trazodone given that she has failed melatonin and Xanax before    We have also reviewed her list of medications and made adjustments as appropriate

## 2022-08-16 NOTE — ASSESSMENT & PLAN NOTE
Lab Results   Component Value Date    EGFR 25 08/05/2022    EGFR 20 08/02/2022    EGFR 45 07/23/2022    CREATININE 1 71 (H) 08/05/2022    CREATININE 2 09 (H) 08/02/2022    CREATININE 1 06 07/23/2022     - Acute kidney injury superimposed on CKD  - Patient recently had diuretic therapy increased due to weight gain with known severe AS and CHF  - Medical management per Internal Medicine

## 2022-08-16 NOTE — ASSESSMENT & PLAN NOTE
- Status post fall with the below noted issues   - No acute traumatic injuries identified requiring trauma admission, further workup or inpatient management  - Fall precautions  - Will discuss with Internal Medicine need for admission due to acute encephalopathy and multiple medical conditions including severe aortic stenosis with suspected volume overload CHF exacerbation

## 2022-08-16 NOTE — ED NOTES
Case management bedside  outpt hospice to be arranged  Pt to be discharged  Family bedside   Meal tray to pt     Nav Oviedo RN  08/16/22 5620

## 2022-08-16 NOTE — TELEPHONE ENCOUNTER
Today, another daughter, Lacy Castanon, called the nurse line  She asked if the increase in metoprolol from 100 mg bid to 125 bid could be causing the complete change in patient's mental status  She said she was ok before that change  Daughter Josephine thought it was from the Lasix increase  Daughter Lopezside called the PCP as you advised yesterday about change in mental status and was advised to take patient to the ED for eval   She did not go to the ED  Lacy Castanon said she and her sister have conflicting thoughts on mom's care       ALDEN

## 2022-08-16 NOTE — ASSESSMENT & PLAN NOTE
Lab Results   Component Value Date    HGBA1C 6 2 (H) 07/22/2022     No results for input(s): POCGLU in the last 72 hours    Blood Sugar Average: Last 72 hrs:  ·  recent A1c on file reflects optimal control-recommend we stop all oral hypoglycemic agents to prevent hypoglycemia particularly in light of renal dysfunction and overall goals of comfort

## 2022-08-16 NOTE — HOSPICE NOTE
Hospice referral received   Pts family aware I will follow up with them in the AM  As she wanted out of the ED as soon as possible

## 2022-08-16 NOTE — ASSESSMENT & PLAN NOTE
- Patient has acute metabolic encephalopathy   - Likely multifactorial in setting of medication change, worsening heart failure related to severe aortic stenosis, acute kidney injury and fall   - Discussed with internal medicine need for admission, workup and medication review as well as management of medical comorbidities

## 2022-08-16 NOTE — ASSESSMENT & PLAN NOTE
· Patient was brought in with reports of progressively worsening mental status over the past several weeks following changes in her cardiac medications    Suspect this is toxic metabolic/multifactorial in the setting of polypharmacy, acute kidney injury, possible hypotension and hypoglycemia

## 2022-08-16 NOTE — ASSESSMENT & PLAN NOTE
Lab Results   Component Value Date    EGFR 25 08/05/2022    EGFR 20 08/02/2022    EGFR 45 07/23/2022    CREATININE 1 71 (H) 08/05/2022    CREATININE 2 09 (H) 08/02/2022    CREATININE 1 06 07/23/2022   · Baseline creatinine noted to be approximately 1 0 but 2 weeks ago on August 2nd, significant KASSIE was noted at 2 09  Patient was put on higher dose of metoprolol and Lasix and repeat labs were not performed  Suspect she could have further progressive acute kidney injury  Offered patient the option to get blood work done today to assess where her kidney function is but she prefers not to stay and wishes to go home      · Recommend decreasing metoprolol and stopping Lasix at this time  · Avoid nephrotoxic agents and hypotension

## 2022-08-16 NOTE — TELEPHONE ENCOUNTER
It's not very common that that metoprolol dose increase would result in such pronounced symptoms  But confusion is a possible (though less common) side effect of metoprolol  If her heart rates are well controlled (less than 100), she could try decreasing metoprolol back to 100 mg BID

## 2022-08-16 NOTE — ACP (ADVANCE CARE PLANNING)
Serious Illness Conversation    1  What is your understanding now of where you are with your illness? Prognostic Understanding: appropriate understanding of prognosis in light of her critical aortic stenosis     2  How much information about what is likely to be ahead with your illness would you like to have? Information: patient wants to be  informed     3  What did you (clinician) communicate to the patient? Prognostic Communication: Function - I hope that this is not the case, but Im worried that things are likely to get more difficult and it become more challenging     4  If your health situation worsens, what are your most important goals? Patient repeatedly states that she is fine if she lives and she is fine if she wakes up tomorrow and eyes that she is comfortable in believes she lived a long time     5  What are the biggest fears and worries about the future and your health? Patient has no fears or concerns at this time as she feels confident in her decision     6  What abilities are so critical to your life that you cannot imagine living without them? Patient wishes to make all her own decisions and does not want them taken away from her  She does not want to stay in the hospital today     7  What gives you strength as you think about the future with your illness? Family and herself     8  If you become sicker, how much are you willing to go through for the possibility of gaining more time? Be in the hospital: No Have a feeding tube: No   Be in the ICU: No Live in a nursing home: No   Be on a ventilator: No Be uncomfortable: No   Be on dialysis: No       9  How much does your proxy and family know about your priorities and wishes? Discussion Discussion: extensive discussion with family about goals and wishes    Family at bedside is aware that patient's mental status is probably not at her best but they do not feel the need to over ride her decision or seek capacity evaluation as they state they have known for a very long time her wishes as far as being comfortable and not having any surgical intervention and being ready to pass      Advanced directives

## 2022-08-16 NOTE — H&P
rBenna Cordova 38  9/17/1929, 80 y o  female MRN: [id number]  Unit/Bed#: ED 28 Encounter: [id number]  Primary Care Provider: Stefan Enriquez MD   Date and time admitted to hospital: 8/16/2022 10:20 AM    Fall  Assessment & Plan  - Status post fall with the below noted issues   - No acute traumatic injuries identified requiring trauma admission, further workup or inpatient management  - Fall precautions  - Will discuss with Internal Medicine need for admission due to acute encephalopathy and multiple medical conditions including severe aortic stenosis with suspected volume overload CHF exacerbation  * Toxic metabolic encephalopathy  Assessment & Plan  - Patient has acute metabolic encephalopathy   - Likely multifactorial in setting of medication change, worsening heart failure related to severe aortic stenosis, acute kidney injury and fall   - Discussed with internal medicine need for admission, workup and medication review as well as management of medical comorbidities  Severe aortic stenosis  Assessment & Plan  - Patient with severe aortic stenosis at baseline resulting in CHF which now appears to be exacerbated by volume overload  - Medical management per Internal Medicine  Chronic diastolic heart failure (HCC)  Assessment & Plan  Wt Readings from Last 3 Encounters:   08/16/22 52 5 kg (115 lb 11 9 oz)   08/12/22 52 kg (114 lb 11 2 oz)   08/10/22 49 9 kg (110 lb)       - Patient with chronic diastolic CHF and severe aortic stenosis, now with apparent volume overload not responsive to oral diuretic therapy  - Medical management per Internal Medicine        Acute kidney injury superimposed on chronic kidney disease [facility])  Assessment & Plan  Lab Results   Component Value Date    EGFR 25 08/05/2022    EGFR 20 08/02/2022    EGFR 45 07/23/2022    CREATININE 1 71 (H) 08/05/2022    CREATININE 2 09 (H) 08/02/2022    CREATININE 1 06 07/23/2022     - Acute kidney injury superimposed on CKD  - Patient recently had diuretic therapy increased due to weight gain with known severe AS and CHF  - Medical management per Internal Medicine  Disposition: Plan for admission to Internal Medicine with trauma to follow-up for tertiary evaluation  Trauma Alert: Level B   Model of Arrival: Ambulance    Trauma Team: Attending Dr Mac Phalen, Fellow Dr Mike Potts and LIDIA Emerson PA-C  Consultants:  Internal Medicine contacted for admission to the medical service  Discussed with Dr Dahiana Barroso in person  History of Present Illness     Chief Complaint:  I fell    Mechanism:Fall     HPI:    Jose Hidalgo is a 80 y o  female who presents for evaluation after reported fall from bed  Patient was mildly confused on arrival but reported that she was trying to get up this morning and fell from bed  She denied hitting her head or losing consciousness  She offers no complaints and denies having any pain at the time of my evaluation  Per report, the patient's daughters note that she has had increasing confusion since her Lasix dosing was increased last Friday due to weight gain  They also note that she has not had any change in her weight or lower extremity edema despite the increase in Lasix  Review of Systems   Constitutional: Negative  Negative for activity change, appetite change, chills, diaphoresis and fever  HENT: Negative  Negative for ear pain and facial swelling  Eyes: Negative  Negative for photophobia, pain and visual disturbance  Respiratory: Negative  Negative for cough, chest tightness, shortness of breath and wheezing  Cardiovascular: Positive for leg swelling  Negative for chest pain and palpitations  Gastrointestinal: Negative  Negative for abdominal distention, abdominal pain, nausea and vomiting  Endocrine: Negative  Genitourinary: Negative  Negative for difficulty urinating, dysuria, flank pain, frequency and hematuria     Musculoskeletal: Negative  Negative for arthralgias, back pain, myalgias and neck pain  Skin: Negative  Negative for color change, pallor, rash and wound  Allergic/Immunologic: Negative  Neurological: Negative  Negative for dizziness, syncope, weakness, light-headedness, numbness and headaches  Hematological: Negative  Psychiatric/Behavioral: Negative  Negative for agitation, confusion and self-injury  The patient is not nervous/anxious  12-point, complete review of systems was reviewed and negative except as stated above       Historical Information     Past Medical History:   Diagnosis Date    A-fib ([facility] [address] )     Anxiety     Coronary artery disease     MI at age 27    Diabetes [facility])     as per stefani    Disease of thyroid gland     HL (hearing loss)     Wears hearing aid, right    Hyperlipidemia     Hypertension     as per [facility]     Past Surgical History:   Procedure Laterality Date    ADENOIDECTOMY      APPENDECTOMY      as per [place]    EYE SURGERY Bilateral 2015    as per Kishan [facility]    STAPEDECTOMY      Metal STAPES LEFT ear (can not have an MRI)- as per [place] TONSILLECTOMY      as per Kishan [facility]        Social History     Tobacco Use    Smoking status: Never Smoker    Smokeless tobacco: Never Used   Vaping Use    Vaping Use: Never used   Substance Use Topics    Alcohol use: Yes     Comment: occasional     Drug use: Never     Immunization History   Administered Date(s) Administered    COVID-19 MODERNA VACC 0 25 ML IM BOOSTER 10/27/2021    COVID-19 MODERNA VACC 0 5 ML IM 01/22/2021, 02/19/2021    INFLUENZA 10/14/2013, 10/12/2015, 10/19/2017, 11/01/2018, 11/02/2020    Influenza Split High Dose Preservative Free IM 10/23/2018    Influenza, recombinant, quadrivalent,injectable, preservative free 10/24/2019    Pneumococcal Conjugate 13-Valent 10/24/2019    Pneumococcal Polysaccharide PPV23 06/01/2013    Tdap 04/22/2018, 04/28/2018     Last Tetanus:  Unknown  Family History: Non-contributory    1  Before the illness or injury that brought you to the Emergency, did you need someone to help you on a regular basis? 1=Yes   2  Since the illness or injury that brought you to the Emergency, have you needed more help than usual to take care of yourself? 1=Yes   3  Have you been hospitalized for one or more nights during the past 6 months (excluding a stay in the Emergency Department)? 1=Yes   4  In general, do you see well? 0=Yes   5  In general, do you have serious problems with your memory? 1=Yes   6  Do you take more than three different medications everyday? 1=Yes   TOTAL   5     Did you order a geriatric consult if the score was 2 or greater?:  Patient intended for admission to Internal Medicine; will defer decision on geriatric consult to primary service       Meds/Allergies   all current active meds have been reviewed and current meds:   Current Facility-Administered Medications   Medication Dose Route Frequency    traZODone (DESYREL) tablet 50 mg  50 mg Oral HS      No Known Allergies    Objective   Initial Vitals:   Pulse: 70 (08/16/22 1026)  Respirations: 18 (08/16/22 1026)  Blood Pressure: 141/84 (08/16/22 1024)    Primary Survey:   Airway:        Status: patent;        Pre-hospital Interventions: none        Hospital Interventions: none  Breathing:        Pre-hospital Interventions: none       Effort: normal       Right breath sounds: normal       Left breath sounds: normal  Circulation:        Rhythm: regular       Rate: regular   Right Pulses Left Pulses    R radial: 2+  R femoral: 2+  R pedal: 2+  R carotid: 2+  R popliteal: 2+ L radial: 2+  L femoral: 2+  L pedal: 2+  L carotid: 2+  L popliteal: 2+   Disability:        GCS: Eye: 4; Verbal: 4 Motor: 6 Total: 14       Right Pupil: 2 mm;  round;  reactive         Left Pupil:  2 mm;  round;  reactive      R Motor Strength L Motor Strength    R : 5/5  R dorsiflex: 5/5  R plantarflex: 5/5 L : 5/5  L dorsiflex: 5/5  L plantarflex: 5/5        Sensory:  No sensory deficit  Exposure:       Completed: Yes      Secondary Survey:  Physical Exam  Vitals and nursing note reviewed  Exam conducted with a chaperone present  Constitutional:       General: She is awake  She is not in acute distress  Appearance: She is normal weight  She is ill-appearing  She is not toxic-appearing or diaphoretic  Interventions: She is not intubated  Cervical collar in place  HENT:      Head: Normocephalic and atraumatic  No abrasion, contusion or laceration  Jaw: There is normal jaw occlusion  Right Ear: Hearing and external ear normal  No swelling or tenderness  Left Ear: Hearing and external ear normal  No swelling or tenderness  Nose: Nose normal  No nasal deformity, septal deviation, signs of injury, laceration or nasal tenderness  Mouth/Throat:      Mouth: Mucous membranes are moist       Pharynx: Oropharynx is clear  Eyes:      General: Lids are normal  Vision grossly intact  Extraocular Movements: Extraocular movements intact  Conjunctiva/sclera: Conjunctivae normal       Pupils: Pupils are equal, round, and reactive to light  Comments: Pupils were 2 mm, equal, round and reactive bilaterally   Neck:      Comments: Cervical collar in place  Cardiovascular:      Rate and Rhythm: Normal rate and regular rhythm  Pulses: Normal pulses  Carotid pulses are 2+ on the right side and 2+ on the left side  Radial pulses are 2+ on the right side and 2+ on the left side  Femoral pulses are 2+ on the right side and 2+ on the left side  Dorsalis pedis pulses are 2+ on the right side and 2+ on the left side  Heart sounds: Normal heart sounds  No murmur heard  No friction rub  No gallop  Pulmonary:      Effort: Pulmonary effort is normal  No tachypnea, bradypnea, accessory muscle usage, prolonged expiration, respiratory distress or retractions  She is not intubated  Breath sounds: Normal breath sounds and air entry  No stridor or decreased air movement  No decreased breath sounds, wheezing, rhonchi or rales  Chest:      Chest wall: No lacerations, deformity, swelling, tenderness or crepitus  Abdominal:      General: Abdomen is flat  Bowel sounds are normal  There is no distension  Palpations: Abdomen is soft  Tenderness: There is no abdominal tenderness  There is no guarding or rebound  Musculoskeletal:         General: No swelling, tenderness, deformity or signs of injury  Normal range of motion  Cervical back: Neck supple  No swelling, deformity, lacerations or tenderness  No spinous process tenderness or muscular tenderness  Thoracic back: No swelling, deformity, lacerations or tenderness  Lumbar back: No swelling, deformity, lacerations or tenderness  Right lower le+ Pitting Edema present  Left lower le+ Pitting Edema present  Comments: No midline cervical, thoracic or lumbar spine tenderness, step-offs or deformities  No paraspinal muscular tenderness in the neck or back  Normal range of motion in all 4 extremities without pain, tenderness or deformity  Skin:     General: Skin is warm and dry  Capillary Refill: Capillary refill takes less than 2 seconds  Coloration: Skin is not jaundiced or pale  Findings: No abrasion, bruising, ecchymosis, erythema, laceration, lesion, rash or wound  Neurological:      General: No focal deficit present  Mental Status: She is alert  She is disoriented  GCS: GCS eye subscore is 4  GCS verbal subscore is 4  GCS motor subscore is 6  Sensory: Sensation is intact  No sensory deficit  Motor: Motor function is intact  No weakness  Comments: Patient remained a GCS of 14 with 1 taken off for mild confusion   Psychiatric:         Mood and Affect: Mood normal          Behavior: Behavior is cooperative           Invasive Devices  Report    Peripheral Intravenous Line  Duration           Peripheral IV 08/16/22 Right Antecubital <1 day              Lab Results:   Results: I have personally reviewed all pertinent laboratory/tests results, BMP/CMP:   Lab Results   Component Value Date    CO2 21 08/16/2022    GLUCOSE 118 08/16/2022   , CBC:   Lab Results   Component Value Date    HGB 11 6 08/16/2022    HCT 34 (L) 08/16/2022   , Coagulation: No results found for: PT, INR, APTT and ISTAT: No components found for: VBG    Imaging Results: I have personally reviewed pertinent reports  and I have personally reviewed pertinent films in PACS  Chest Xray(s): negative for acute findings   FAST exam(s): negative for acute findings   CT Scan(s): negative for acute findings   Additional Xray(s): N/A     Other Studies: N/A    Code Status: Prior  Advance Directive and Living Will:      Power of :    POLST:    I have spent 45 minutes with Patient and family today in which greater than 50% of this time was spent in counseling/coordination of care regarding Diagnostic results, Intructions for management and Impressions

## 2022-08-17 NOTE — CASE COMMUNICATION
For RLOC  Suzy Calvert  29  She is a 81 yo female with long standing heart disease  Hx includes Chronic Diastolic Heart failure  EF 60 percent  Severe to critical AS, Mod to severe MR, Mild TR  Declined TAVR  CAD had her first MI at age 27, New on set afib with rapid RVR in July, Pulm  edema, Pulmonary HTN, CKD stage 3, Dementia, DM2  Was in the ER 7 8 for HF and Hypoxia, ER 7 21 for Afib with RVR, 8 5 ER again for STEVENSON with  increased wt gain declined  Adm  Sees cardiology monthly at least  Last visit was on  where hospice was discussed at that time then she fell yestereday at home went to ER were all parties where in agreement with hospice  Meds Metoprolol 12 5 Digoxin, Lasix just increased to 40 mg BID  Lisinopril and Amiodypine Dcd  Despite this her wt was 107 on  and in the MD office  she was 114  Labs BUN 67, Creatinine 1 71 GFR 25 BNP 1554   She appears [facility] for [facility]     Approved for RLOC by Dr Loco Carson 8 18 22  Dx Hypertensive Heart and Kidney disease i13  0

## 2022-08-17 NOTE — UTILIZATION REVIEW
Please void request [id number] for this patient  The IP order was cancelled    Inpatient Admission Authorization Request   NOTIFICATION OF INPATIENT ADMISSION/INPATIENT AUTHORIZATION REQUEST   SERVICING FACILITY:    [address] [place] [address], [address] [place] [address]  Tax ID: [id number]  NPI: [id number]  [facility]  [address]  Place of Service Code: 24     ATTENDING PROVIDER:  Attending Name and NPI#: Prosper Rangelma [[id number]]  Address: Pete Saab  [address]  Phone: [phone]     UTILIZATION REVIEW CONTACT:  Pavel Rossi, Utilization   Network Utilization Review Department  Phone: [phone]  Fax: [phone]  Email: [email or web address]     PHYSICIAN ADVISORY SERVICES:  FOR [id number] REVIEW - MEDICAL NECESSITY DENIAL  Phone: [phone]  Fax: [phone]  Email: [email or web address]     TYPE OF REQUEST:  Inpatient Status     ADMISSION INFORMATION:  ADMISSION DATE/TIME: 8/16/22  1:28 PM  PATIENT DIAGNOSIS CODE/DESCRIPTION:  Encounter for other general examination [Z00 8]  DISCHARGE DATE/TIME: 8/16/2022  3:59 PM   IMPORTANT INFORMATION:  Please contact Pavel Rossi directly with any questions or concerns regarding this request  Department voicemails are confidential     Send requests for admission clinical reviews, concurrent reviews, approvals, and administrative denials due to lack of clinical to fax [phone]

## 2022-08-25 ENCOUNTER — HOME CARE VISIT (OUTPATIENT)
Dept: HOME HOSPICE | Facility: HOSPICE | Age: 87
End: 2022-08-25
Payer: MEDICARE

## 2022-09-01 ENCOUNTER — HOME CARE VISIT (OUTPATIENT)
Dept: HOME HOSPICE | Facility: HOSPICE | Age: 87
End: 2022-09-01
Payer: MEDICARE

## 2022-09-01 NOTE — HOSPICE
Benoit Morales, Bereavement Final IDG [id number] ([id number]) Due: 22  : 1929  SOC: 22  DOD: 22  Diagnosis: Heart & Kidney Disease  Primary: Daughter - Lillian Jimenez was a 80year old woman who lived alone  Two daughters, Isauro Cardona and Akiko Nickerson, helped with care along with private duty caregivers  Akiko Nickerson lives an hour away  Isauro Cardona stated there was always someone in the home with her mother  Estrella Barron was described as organized and funny  She worked at [employer]  She was Enriuqe Sahu and used to attend [employer] in [place]  Bereavement services were reviewed  Only phone number provided for daughter Isauro Cardona, at this time, who is to be followed at [facility]  TOD: Daughters present  CC: Krystyna Alexander made a condolence call to daughter Isauro Cardona  Call Assignments:  Jeremy Vega to reassess daughter [address] Caldwell at [facility]  Please request address for bereavement follow-up, as able   (Due: 22)

## 2022-10-04 LAB
DME PARACHUTE DELIVERY DATE ACTUAL: NORMAL
DME PARACHUTE DELIVERY DATE REQUESTED: NORMAL
DME PARACHUTE ITEM DESCRIPTION: NORMAL
DME PARACHUTE ITEM DESCRIPTION: NORMAL
DME PARACHUTE ORDER STATUS: NORMAL
DME PARACHUTE SUPPLIER NAME: NORMAL
DME PARACHUTE SUPPLIER PHONE: NORMAL
